# Patient Record
Sex: FEMALE | Race: WHITE | NOT HISPANIC OR LATINO | Employment: FULL TIME | URBAN - NONMETROPOLITAN AREA
[De-identification: names, ages, dates, MRNs, and addresses within clinical notes are randomized per-mention and may not be internally consistent; named-entity substitution may affect disease eponyms.]

---

## 2017-03-15 ENCOUNTER — HOSPITAL ENCOUNTER (OUTPATIENT)
Dept: ULTRASOUND IMAGING | Facility: HOSPITAL | Age: 47
Discharge: HOME | End: 2017-03-15
Attending: SPECIALIST | Admitting: SPECIALIST

## 2017-03-15 PROCEDURE — 76642 ULTRASOUND BREAST LIMITED: CPT

## 2017-03-22 ENCOUNTER — TRANSCRIBE ORDERS (OUTPATIENT)
Dept: ADMINISTRATIVE | Facility: HOSPITAL | Age: 47
End: 2017-03-22

## 2017-03-22 DIAGNOSIS — Z12.31 ENCOUNTER FOR SCREENING MAMMOGRAM FOR MALIGNANT NEOPLASM OF BREAST: Primary | ICD-10-CM

## 2017-09-29 ENCOUNTER — TRANSCRIBE ORDERS (OUTPATIENT)
Dept: ADMINISTRATIVE | Facility: HOSPITAL | Age: 47
End: 2017-09-29

## 2017-09-29 ENCOUNTER — HOSPITAL ENCOUNTER (OUTPATIENT)
Dept: MAMMOGRAPHY | Facility: HOSPITAL | Age: 47
Discharge: HOME | End: 2017-09-29
Attending: SPECIALIST | Admitting: SPECIALIST

## 2017-09-29 DIAGNOSIS — N63.20 MASSES OF BOTH BREASTS: Primary | ICD-10-CM

## 2017-09-29 DIAGNOSIS — Z12.31 ENCOUNTER FOR SCREENING MAMMOGRAM FOR MALIGNANT NEOPLASM OF BREAST: ICD-10-CM

## 2017-09-29 DIAGNOSIS — N63.10 MASSES OF BOTH BREASTS: Primary | ICD-10-CM

## 2017-09-29 PROCEDURE — G0202 SCR MAMMO BI INCL CAD: HCPCS

## 2017-09-29 PROCEDURE — 77063 BREAST TOMOSYNTHESIS BI: CPT

## 2017-10-03 ENCOUNTER — TRANSCRIBE ORDERS (OUTPATIENT)
Dept: ADMINISTRATIVE | Facility: HOSPITAL | Age: 47
End: 2017-10-03

## 2017-10-03 DIAGNOSIS — Z12.31 ENCOUNTER FOR SCREENING MAMMOGRAM FOR MALIGNANT NEOPLASM OF BREAST: Primary | ICD-10-CM

## 2018-10-01 ENCOUNTER — HOSPITAL ENCOUNTER (OUTPATIENT)
Dept: MAMMOGRAPHY | Facility: HOSPITAL | Age: 48
Discharge: HOME | End: 2018-10-01
Attending: SPECIALIST | Admitting: SPECIALIST

## 2018-10-01 ENCOUNTER — HOSPITAL ENCOUNTER (OUTPATIENT)
Dept: ULTRASOUND IMAGING | Facility: HOSPITAL | Age: 48
Discharge: HOME | End: 2018-10-01

## 2018-10-01 DIAGNOSIS — N63.20 LEFT BREAST LUMP: ICD-10-CM

## 2018-10-01 DIAGNOSIS — Z12.31 ENCOUNTER FOR SCREENING MAMMOGRAM FOR MALIGNANT NEOPLASM OF BREAST: ICD-10-CM

## 2018-10-01 PROCEDURE — 77063 BREAST TOMOSYNTHESIS BI: CPT

## 2018-10-01 PROCEDURE — 76642 ULTRASOUND BREAST LIMITED: CPT

## 2018-10-01 PROCEDURE — 77067 SCR MAMMO BI INCL CAD: CPT

## 2018-10-10 ENCOUNTER — APPOINTMENT (OUTPATIENT)
Dept: CT IMAGING | Facility: HOSPITAL | Age: 48
End: 2018-10-10

## 2018-10-10 ENCOUNTER — HOSPITAL ENCOUNTER (EMERGENCY)
Facility: HOSPITAL | Age: 48
Discharge: HOME | End: 2018-10-10
Attending: EMERGENCY MEDICINE | Admitting: EMERGENCY MEDICINE

## 2018-10-10 VITALS
HEIGHT: 65 IN | OXYGEN SATURATION: 98 % | BODY MASS INDEX: 29.49 KG/M2 | WEIGHT: 177 LBS | RESPIRATION RATE: 16 BRPM | DIASTOLIC BLOOD PRESSURE: 75 MMHG | HEART RATE: 70 BPM | TEMPERATURE: 98.2 F | SYSTOLIC BLOOD PRESSURE: 121 MMHG

## 2018-10-10 DIAGNOSIS — K57.92 DIVERTICULITIS: Primary | ICD-10-CM

## 2018-10-10 LAB
ALBUMIN SERPL-MCNC: 4.4 G/DL (ref 3.5–5)
ALBUMIN/GLOB SERPL: 1.7 G/DL (ref 1.1–2.5)
ALP SERPL-CCNC: 54 U/L (ref 24–120)
ALT SERPL W P-5'-P-CCNC: 46 U/L (ref 0–54)
ANION GAP SERPL CALCULATED.3IONS-SCNC: 11 MMOL/L (ref 4–13)
AST SERPL-CCNC: 41 U/L (ref 7–45)
BACTERIA UR QL AUTO: ABNORMAL /HPF
BASOPHILS # BLD AUTO: 0.03 10*3/MM3 (ref 0–0.2)
BASOPHILS NFR BLD AUTO: 0.4 % (ref 0–2)
BILIRUB SERPL-MCNC: 0.9 MG/DL (ref 0.1–1)
BILIRUB UR QL STRIP: NEGATIVE
BUN BLD-MCNC: 9 MG/DL (ref 5–21)
BUN/CREAT SERPL: 14.1 (ref 7–25)
CALCIUM SPEC-SCNC: 9.1 MG/DL (ref 8.4–10.4)
CHLORIDE SERPL-SCNC: 101 MMOL/L (ref 98–110)
CLARITY UR: CLEAR
CO2 SERPL-SCNC: 29 MMOL/L (ref 24–31)
COLOR UR: YELLOW
CREAT BLD-MCNC: 0.64 MG/DL (ref 0.5–1.4)
DEPRECATED RDW RBC AUTO: 40.7 FL (ref 40–54)
EOSINOPHIL # BLD AUTO: 0.1 10*3/MM3 (ref 0–0.7)
EOSINOPHIL NFR BLD AUTO: 1.2 % (ref 0–4)
ERYTHROCYTE [DISTWIDTH] IN BLOOD BY AUTOMATED COUNT: 11.8 % (ref 12–15)
GFR SERPL CREATININE-BSD FRML MDRD: 100 ML/MIN/1.73
GLOBULIN UR ELPH-MCNC: 2.6 GM/DL
GLUCOSE BLD-MCNC: 91 MG/DL (ref 70–100)
GLUCOSE UR STRIP-MCNC: NEGATIVE MG/DL
HCG SERPL QL: NEGATIVE
HCT VFR BLD AUTO: 40.9 % (ref 37–47)
HGB BLD-MCNC: 14.1 G/DL (ref 12–16)
HGB UR QL STRIP.AUTO: NEGATIVE
HOLD SPECIMEN: NORMAL
HYALINE CASTS UR QL AUTO: ABNORMAL /LPF
IMM GRANULOCYTES # BLD: 0.03 10*3/MM3 (ref 0–0.03)
IMM GRANULOCYTES NFR BLD: 0.4 % (ref 0–5)
KETONES UR QL STRIP: NEGATIVE
LEUKOCYTE ESTERASE UR QL STRIP.AUTO: ABNORMAL
LYMPHOCYTES # BLD AUTO: 1.35 10*3/MM3 (ref 0.72–4.86)
LYMPHOCYTES NFR BLD AUTO: 16.3 % (ref 15–45)
MCH RBC QN AUTO: 32.9 PG (ref 28–32)
MCHC RBC AUTO-ENTMCNC: 34.5 G/DL (ref 33–36)
MCV RBC AUTO: 95.3 FL (ref 82–98)
MONOCYTES # BLD AUTO: 0.66 10*3/MM3 (ref 0.19–1.3)
MONOCYTES NFR BLD AUTO: 8 % (ref 4–12)
NEUTROPHILS # BLD AUTO: 6.1 10*3/MM3 (ref 1.87–8.4)
NEUTROPHILS NFR BLD AUTO: 73.7 % (ref 39–78)
NITRITE UR QL STRIP: NEGATIVE
NRBC BLD MANUAL-RTO: 0 /100 WBC (ref 0–0)
PH UR STRIP.AUTO: 8.5 [PH] (ref 5–8)
PLATELET # BLD AUTO: 282 10*3/MM3 (ref 130–400)
PMV BLD AUTO: 9.6 FL (ref 6–12)
POTASSIUM BLD-SCNC: 4 MMOL/L (ref 3.5–5.3)
PROT SERPL-MCNC: 7 G/DL (ref 6.3–8.7)
PROT UR QL STRIP: NEGATIVE
RBC # BLD AUTO: 4.29 10*6/MM3 (ref 4.2–5.4)
RBC # UR: ABNORMAL /HPF
REF LAB TEST METHOD: ABNORMAL
SODIUM BLD-SCNC: 141 MMOL/L (ref 135–145)
SP GR UR STRIP: 1.02 (ref 1–1.03)
SQUAMOUS #/AREA URNS HPF: ABNORMAL /HPF
UROBILINOGEN UR QL STRIP: ABNORMAL
WBC NRBC COR # BLD: 8.27 10*3/MM3 (ref 4.8–10.8)
WBC UR QL AUTO: ABNORMAL /HPF
WHOLE BLOOD HOLD SPECIMEN: NORMAL

## 2018-10-10 PROCEDURE — 99284 EMERGENCY DEPT VISIT MOD MDM: CPT

## 2018-10-10 PROCEDURE — 80053 COMPREHEN METABOLIC PANEL: CPT | Performed by: EMERGENCY MEDICINE

## 2018-10-10 PROCEDURE — 84703 CHORIONIC GONADOTROPIN ASSAY: CPT | Performed by: EMERGENCY MEDICINE

## 2018-10-10 PROCEDURE — 25010000002 IOPAMIDOL 61 % SOLUTION: Performed by: EMERGENCY MEDICINE

## 2018-10-10 PROCEDURE — 85025 COMPLETE CBC W/AUTO DIFF WBC: CPT | Performed by: EMERGENCY MEDICINE

## 2018-10-10 PROCEDURE — 81001 URINALYSIS AUTO W/SCOPE: CPT | Performed by: EMERGENCY MEDICINE

## 2018-10-10 PROCEDURE — 74177 CT ABD & PELVIS W/CONTRAST: CPT

## 2018-10-10 PROCEDURE — 87086 URINE CULTURE/COLONY COUNT: CPT | Performed by: EMERGENCY MEDICINE

## 2018-10-10 RX ORDER — METRONIDAZOLE 500 MG/1
500 TABLET ORAL 2 TIMES DAILY
Qty: 20 TABLET | Refills: 0 | Status: SHIPPED | OUTPATIENT
Start: 2018-10-10 | End: 2019-02-18

## 2018-10-10 RX ORDER — CIPROFLOXACIN 500 MG/1
500 TABLET, FILM COATED ORAL 2 TIMES DAILY
Qty: 20 TABLET | Refills: 0 | Status: SHIPPED | OUTPATIENT
Start: 2018-10-10 | End: 2019-02-18

## 2018-10-10 RX ORDER — LEVOTHYROXINE SODIUM 0.07 MG/1
75 TABLET ORAL DAILY
COMMUNITY

## 2018-10-10 RX ORDER — SODIUM CHLORIDE 0.9 % (FLUSH) 0.9 %
10 SYRINGE (ML) INJECTION AS NEEDED
Status: DISCONTINUED | OUTPATIENT
Start: 2018-10-10 | End: 2018-10-10 | Stop reason: HOSPADM

## 2018-10-10 RX ORDER — CHLORAL HYDRATE 500 MG
2000 CAPSULE ORAL
COMMUNITY

## 2018-10-10 RX ORDER — HYDROCODONE BITARTRATE AND ACETAMINOPHEN 7.5; 325 MG/1; MG/1
1 TABLET ORAL EVERY 4 HOURS PRN
Qty: 15 TABLET | Refills: 0 | Status: SHIPPED | OUTPATIENT
Start: 2018-10-10 | End: 2019-02-18

## 2018-10-10 RX ORDER — FEXOFENADINE HCL 180 MG/1
180 TABLET ORAL DAILY
COMMUNITY
End: 2022-02-14 | Stop reason: SDUPTHER

## 2018-10-10 RX ORDER — VILAZODONE HYDROCHLORIDE 40 MG/1
40 TABLET ORAL DAILY
COMMUNITY
End: 2020-02-20

## 2018-10-10 RX ADMIN — IOPAMIDOL 100 ML: 612 INJECTION, SOLUTION INTRAVENOUS at 11:18

## 2018-10-10 NOTE — ED PROVIDER NOTES
Subjective   Patient complains of lower abdominal pain but really got bad this morning.  She said some diarrhea off and on for the past week or so.  She says the pain is cramping in nature, waxes and wanes.  It is mostly left lower quadrant.  She did have a UTI about a month ago and still has some dysuria but she has finished all medications.  She does not have periods because she had ablation therapy in the past.  She had her gallbladder out some years ago.        History provided by:  Patient   used: No    Abdominal Pain   Pain location:  LLQ  Pain quality: aching    Pain radiates to:  Does not radiate  Pain severity:  Severe  Onset quality:  Sudden  Duration:  1 day  Timing:  Intermittent  Progression:  Worsening  Chronicity:  New  Context: not alcohol use, not awakening from sleep, not diet changes, not eating, not laxative use, not medication withdrawal, not recent illness, not recent travel, not retching, not sick contacts, not suspicious food intake and not trauma    Relieved by:  Nothing  Worsened by:  Nothing  Ineffective treatments:  None tried  Associated symptoms: diarrhea and dysuria    Associated symptoms: no anorexia, no constipation, no cough, no hematemesis, no hematochezia, no shortness of breath, no sore throat and no vaginal discharge    Risk factors: no alcohol abuse, has not had multiple surgeries, no NSAID use and no recent hospitalization        Review of Systems   Constitutional: Negative.    HENT: Negative.  Negative for sore throat.    Respiratory: Negative.  Negative for cough and shortness of breath.    Cardiovascular: Negative.    Gastrointestinal: Positive for abdominal pain and diarrhea. Negative for anorexia, constipation, hematemesis and hematochezia.   Genitourinary: Positive for dysuria. Negative for vaginal discharge.   Musculoskeletal: Negative.    Skin: Negative.    Neurological: Negative.    Hematological: Negative.    Psychiatric/Behavioral: Negative.     All other systems reviewed and are negative.      Past Medical History:   Diagnosis Date   • Anxiety    • Hyperlipidemia    • Hypothyroid        Allergies   Allergen Reactions   • Bactrim [Sulfamethoxazole-Trimethoprim] Anaphylaxis       Past Surgical History:   Procedure Laterality Date   • BREAST BIOPSY Left 2 yrs ago   • CHOLECYSTECTOMY         Family History   Problem Relation Age of Onset   • No Known Problems Mother    • No Known Problems Father    • No Known Problems Sister    • No Known Problems Brother    • No Known Problems Daughter    • No Known Problems Son    • No Known Problems Maternal Grandmother    • No Known Problems Paternal Grandmother    • No Known Problems Maternal Aunt    • No Known Problems Paternal Aunt    • BRCA 1/2 Neg Hx    • Breast cancer Neg Hx    • Colon cancer Neg Hx    • Endometrial cancer Neg Hx    • Ovarian cancer Neg Hx        Social History     Social History   • Marital status:      Social History Main Topics   • Smoking status: Never Smoker   • Alcohol use No   • Drug use: No     Other Topics Concern   • Not on file       Prior to Admission medications    Medication Sig Start Date End Date Taking? Authorizing Provider   fexofenadine (ALLEGRA) 180 MG tablet Take 180 mg by mouth Daily.   Yes Deedee Dhillon MD   levothyroxine (SYNTHROID, LEVOTHROID) 75 MCG tablet Take 75 mcg by mouth Daily.   Yes Deedee Dhillon MD   Omega-3 Fatty Acids (FISH OIL) 1000 MG capsule capsule Take 2,000 mg by mouth Daily With Breakfast.   Yes ProviderDeedee MD   vilazodone (VIIBRYD) 40 MG tablet tablet Take 40 mg by mouth Daily.   Yes ProviderDeedee MD       Medications   sodium chloride 0.9 % flush 10 mL (not administered)   iopamidol (ISOVUE-300) 61 % injection 100 mL (100 mL Intravenous Given 10/10/18 1118)       Vitals:    10/10/18 1042   BP: 114/62   Pulse: 77   Resp: 16   Temp:    SpO2: 95%         Objective   Physical Exam   Constitutional: She is oriented to  person, place, and time. She appears well-developed and well-nourished.   HENT:   Head: Normocephalic and atraumatic.   Neck: Normal range of motion. Neck supple.   Cardiovascular: Normal rate and regular rhythm.    Pulmonary/Chest: Effort normal and breath sounds normal.   Abdominal: Soft. Bowel sounds are normal. There is tenderness.   Patient is tender to palpation in the left lower quadrant but there is no rebound or percussion tenderness noted.   Musculoskeletal: Normal range of motion.   Neurological: She is alert and oriented to person, place, and time.   Skin: Skin is warm and dry.   Psychiatric: She has a normal mood and affect. Her behavior is normal.   Nursing note and vitals reviewed.      Procedures         Lab Results (last 24 hours)     Procedure Component Value Units Date/Time    Urinalysis With Culture If Indicated - Urine, Clean Catch [[id number]]  (Abnormal) Collected:  10/10/18 1010    Specimen:  Urine from Urine, Clean Catch Updated:  10/10/18 1049     Color, UA Yellow     Appearance, UA Clear     pH, UA 8.5 (H)     Specific Gravity, UA 1.016     Glucose, UA Negative     Ketones, UA Negative     Bilirubin, UA Negative     Blood, UA Negative     Protein, UA Negative     Leuk Esterase, UA Large (3+) (A)     Nitrite, UA Negative     Urobilinogen, UA 0.2 E.U./dL    Urinalysis, Microscopic Only - Urine, Clean Catch [[id number]]  (Abnormal) Collected:  10/10/18 1010    Specimen:  Urine from Urine, Clean Catch Updated:  10/10/18 1049     RBC, UA 0-2 (A) /HPF      WBC, UA 21-30 (A) /HPF      Bacteria, UA 1+ (A) /HPF      Squamous Epithelial Cells, UA 0-2 /HPF      Hyaline Casts, UA 0-2 /LPF      Methodology Automated Microscopy    Urine Culture - Urine, [[id number]] Collected:  10/10/18 1010    Specimen:  Urine from Urine, Clean Catch Updated:  10/10/18 1049    CBC & Differential [[id number]] Collected:  10/10/18 1015    Specimen:  Blood Updated:  10/10/18 1042    Narrative:       The following orders were  created for panel order CBC & Differential.  Procedure                               Abnormality         Status                     ---------                               -----------         ------                     CBC Auto Differential[[id number]]        Abnormal            Final result                 Please view results for these tests on the individual orders.    Comprehensive Metabolic Panel [[id number]] Collected:  10/10/18 1015    Specimen:  Blood from Arm, Left Updated:  10/10/18 1055     Glucose 91 mg/dL      BUN 9 mg/dL      Creatinine 0.64 mg/dL      Sodium 141 mmol/L      Potassium 4.0 mmol/L      Chloride 101 mmol/L      CO2 29.0 mmol/L      Calcium 9.1 mg/dL      Total Protein 7.0 g/dL      Albumin 4.40 g/dL      ALT (SGPT) 46 U/L      AST (SGOT) 41 U/L      Alkaline Phosphatase 54 U/L      Total Bilirubin 0.9 mg/dL      eGFR Non African Amer 100 mL/min/1.73      Globulin 2.6 gm/dL      A/G Ratio 1.7 g/dL      BUN/Creatinine Ratio 14.1     Anion Gap 11.0 mmol/L     hCG, Serum, Qualitative [[id number]]  (Normal) Collected:  10/10/18 1015    Specimen:  Blood from Arm, Left Updated:  10/10/18 1054     HCG Qualitative Negative    CBC Auto Differential [[id number]]  (Abnormal) Collected:  10/10/18 1015    Specimen:  Blood from Arm, Left Updated:  10/10/18 1042     WBC 8.27 10*3/mm3      RBC 4.29 10*6/mm3      Hemoglobin 14.1 g/dL      Hematocrit 40.9 %      MCV 95.3 fL      MCH 32.9 (H) pg      MCHC 34.5 g/dL      RDW 11.8 (L) %      RDW-SD 40.7 fl      MPV 9.6 fL      Platelets 282 10*3/mm3      Neutrophil % 73.7 %      Lymphocyte % 16.3 %      Monocyte % 8.0 %      Eosinophil % 1.2 %      Basophil % 0.4 %      Immature Grans % 0.4 %      Neutrophils, Absolute 6.10 10*3/mm3      Lymphocytes, Absolute 1.35 10*3/mm3      Monocytes, Absolute 0.66 10*3/mm3      Eosinophils, Absolute 0.10 10*3/mm3      Basophils, Absolute 0.03 10*3/mm3      Immature Grans, Absolute 0.03 10*3/mm3      nRBC 0.0 /100 WBC            CT Abdomen Pelvis With Contrast   Final Result   1. Findings compatible with acute segmental sigmoid   diverticulitis/colitis with no evidence of perforation or abscess. With   the colonic wall thickening, follow-up is recommended with short   interval repeat CT or colonoscopy to exclude underlying neoplasm.   2. 4 cm left ovarian cyst, follow-up pelvic ultrasound is suggested.   3. Mild fatty infiltration of the liver. Evidence of previous   cholecystectomy.   4. 3.3 cm cystic lesion in the lower outer quadrant of the left breast,   the patient has a history of fibrocystic breast disease.           This report was finalized on 10/10/2018 11:34 by Dr. Rik Tomas MD.          ED Course  ED Course as of Oct 10 1246   Wed Oct 10, 2018   1245 Told the patient the findings of diverticulitis on CT.  We will treat that as an outpatient since she is not running fever and does not have an elevated white blood cell count and that is what she wishes.  She is discharged in stable condition.  [TR]      ED Course User Index  [TR] Kj Holbrook Jr., MD          MDM  Number of Diagnoses or Management Options  Diverticulitis: new and requires workup     Amount and/or Complexity of Data Reviewed  Clinical lab tests: ordered and reviewed  Tests in the radiology section of CPT®: ordered and reviewed    Risk of Complications, Morbidity, and/or Mortality  Presenting problems: moderate  Diagnostic procedures: moderate  Management options: moderate    Patient Progress  Patient progress: stable      Final diagnoses:   Diverticulitis          Kj Holbrook Jr., MD  10/10/18 1246

## 2018-10-12 LAB — BACTERIA SPEC AEROBE CULT: ABNORMAL

## 2018-11-08 RX ORDER — VILAZODONE HYDROCHLORIDE 40 MG/1
40 TABLET ORAL DAILY
COMMUNITY
End: 2019-03-15 | Stop reason: ALTCHOICE

## 2018-11-08 RX ORDER — AMOXICILLIN 500 MG
1200 CAPSULE ORAL DAILY
COMMUNITY
End: 2019-03-07

## 2018-11-08 RX ORDER — ZINC GLUCONATE 50 MG
50 TABLET ORAL DAILY
COMMUNITY
End: 2021-09-30 | Stop reason: ALTCHOICE

## 2018-11-08 RX ORDER — FEXOFENADINE HCL 180 MG/1
180 TABLET ORAL DAILY
COMMUNITY

## 2018-11-08 RX ORDER — LEVOTHYROXINE SODIUM 0.07 MG/1
75 TABLET ORAL DAILY
COMMUNITY
End: 2019-04-24

## 2018-11-09 ENCOUNTER — OFFICE VISIT (OUTPATIENT)
Dept: GASTROENTEROLOGY | Age: 48
End: 2018-11-09
Payer: COMMERCIAL

## 2018-11-09 VITALS
HEIGHT: 65 IN | SYSTOLIC BLOOD PRESSURE: 120 MMHG | BODY MASS INDEX: 30.12 KG/M2 | WEIGHT: 180.8 LBS | HEART RATE: 90 BPM | DIASTOLIC BLOOD PRESSURE: 80 MMHG | OXYGEN SATURATION: 98 %

## 2018-11-09 DIAGNOSIS — K52.9 CHRONIC DIARRHEA: Primary | ICD-10-CM

## 2018-11-09 DIAGNOSIS — Z90.49 S/P CHOLECYSTECTOMY: ICD-10-CM

## 2018-11-09 DIAGNOSIS — R13.10 DYSPHAGIA, UNSPECIFIED TYPE: ICD-10-CM

## 2018-11-09 DIAGNOSIS — Z87.19 HISTORY OF COLONIC DIVERTICULITIS: ICD-10-CM

## 2018-11-09 DIAGNOSIS — R11.0 NAUSEA: ICD-10-CM

## 2018-11-09 PROCEDURE — 99203 OFFICE O/P NEW LOW 30 MIN: CPT | Performed by: NURSE PRACTITIONER

## 2018-11-09 RX ORDER — ONDANSETRON 4 MG/1
TABLET, FILM COATED ORAL
Qty: 5 TABLET | Refills: 0 | Status: SHIPPED | OUTPATIENT
Start: 2018-11-09 | End: 2019-02-22

## 2018-11-09 ASSESSMENT — ENCOUNTER SYMPTOMS
VOMITING: 0
BACK PAIN: 0
ANAL BLEEDING: 0
DIARRHEA: 1
RECTAL PAIN: 0
CONSTIPATION: 0
TROUBLE SWALLOWING: 1
BLOOD IN STOOL: 0
SHORTNESS OF BREATH: 0
COUGH: 0
NAUSEA: 0
ABDOMINAL PAIN: 0
ABDOMINAL DISTENTION: 0
SORE THROAT: 0
VOICE CHANGE: 0

## 2018-11-09 NOTE — PATIENT INSTRUCTIONS
You are going to have an Endoscopy and here are some basic instructions:    Nothing to eat or drink after midnight EXCEPT:  PLEASE TAKE MEDICATION(S) FOR HIGH BLOOD PRESSURE, SEIZURES, HEART, AND THYROID WITH A SIP OF WATER AT LEAST 2 HOURS PRIOR TO ARRIVAL TIME.   YOU MAY ALSO TAKE ANY INHALERS YOU ARE PRESCRIBED. You will not be able to drive for 24 hours after the procedure due to sedation. Bring a  with you the day of the procedure. No aspirin, ibuprofen, naproxen, fish oil or vitamin E for 5 days before procedure. Continue current medications. If you are on blood thinners, clearance from the prescribing physician will be obtained before your procedure is scheduled. Increased [email or web address] may be associated with discontinuation of your blood thinner and include, but not limited to, stroke, TIA, or cardiac event. If biopsies are taken during the procedure they will be sent to a pathologist for analysis. You will be notified by mail of the pathology results in 2-3 weeks. Your physician may also schedule a follow up appointment with the nurse practitioner to discuss pathology, symptoms or to check if you have had any problems related to your procedure. If you prefer not to return to the office after your procedure please discuss this with your physician on the day of your procedure. You are going to have a colonoscopy and here are some instructions: You will be given specific directions regarding restrictions to diet and bowel prep instructions including laxatives. Please read these instructions one week prior to your scheduled procedure to ensure that you are prepared. Follow prep instructions provided for bowel prep. Take all of the bowel prep as directed. If you are having problems with nausea, stop your prep for 30-45 min to allow the nausea to subside before resuming your prep.      Nothing to eat or drink after midnight the day of the procedure EXCEPT:  PLEASE TAKE

## 2018-11-09 NOTE — PROGRESS NOTES
Subjective:      Myrtle Urbano is a55 y.o. female  Chief Complaint   Patient presents with    New Patient     ref by Dr. Sameer Olivo for chronic diarrhea, hx of diverticultitis       HPI  PCP: Amilcar Sharp MD  Referring Provider: Wilberto Mock MD  New pt referred here for recent diverticulitis attack. Also had a UTI at that time. Occurred in October 2018. Took antibiotics for 2 weeks. Had a reaction to the antibiotics and couldn't walk. This was the only diverticulitis attack she has ever had. Presented with c/o lower abdominal pain. The lower abdominal pain has since resolved. C/o dysphagia. Chronic for >2 years. Gradually getting worse. Now occurs almost daily. Has to regurgitate foods up often. Occurs with foods only. Any type of foods, but especially meats and breads. Has chronic allergies, no asthma. No daily heartburn. No other UGI complaints. C/o postprandial diarrhea. This is chronic since her GB was removed in 2016. Worse over the past month after she took the antibioitics for the diverticuliitis and UTI. Has approx 10-12 diarrhea stools daily. No blood in stool. EGD in  1990's- \"normal\" per pt recall    Family HX:    Pt denies family hx of colon polyps, colon CA, inflammatory bowel dx, gastric CA and esophageal CA. Past Medical History:   Diagnosis Date    Anxiety     GERD (gastroesophageal reflux disease)     Hypothyroidism        Past Surgical History:   Procedure Laterality Date    CHOLECYSTECTOMY  2016     DR. Sunil Thayer EGD      Dr. Gifty Hanna History     Social History    Marital status:      Spouse name: N/A    Number of children: N/A    Years of education: N/A     Social History Main Topics    Smoking status: Never Smoker    Smokeless tobacco: Never Used    Alcohol use No    Drug use: No    Sexual activity: Not Asked     Other Topics Concern    None     Social History Narrative    None       Allergies   Allergen gallop and no friction rub. No murmur heard. Pulmonary/Chest: Effort normal and breath sounds normal. No respiratory distress. Abdominal: Soft. Bowel sounds are normal. She exhibits no distension. There is no tenderness. There is no rebound. Musculoskeletal: Normal range of motion. She exhibits no edema or deformity. Neurological: She is alert and oriented to person, place, and time. No cranial nerve deficit. Psychiatric: She has a normal mood and affect. Judgment normal.   Nursing note and vitals reviewed. Assessment:       Diagnosis Orders   1. Chronic diarrhea  COLONOSCOPY W/ OR W/O BIOPSY    ESOPHAGOSCOPY / EGD   2. S/P cholecystectomy     3. Dysphagia, unspecified type  ESOPHAGOSCOPY / EGD   4. History of colonic diverticulitis  COLONOSCOPY W/ OR W/O BIOPSY         Plan:      1. Diatherix rectal swab, will call with results. If normal will escribe colestipol for her. 2. Schedule outpatient endoscopy- r/o celiac sprue. Patient advised no Aspirin, Fish Oil, Vit E or NSAIDs 5 (five) days before procedure. Follow-up Visit: per Dr. Arley Escudero  Pt Education:   Risks, benefits, and alternatives to endoscopy were discussed. Patient voices understanding of risks of, but not limited to, perforation, bleeding, and infection. The risk of perforation is increased with esophageal dilatation. All questions answered to patient's satisfaction. Patient is agreable to proceed. 3. Schedule outpatient colonoscopy-random colon bx. Pt advised that if she has new onset of LLQ pain prior to colonoscopy she needs to call us so we can order a CT to assure no re-occurrence of diverticulitis, because performing a colonoscopy during an acute diverticulitis flare increases the risk of perforation. She voices understanding. Patient advised no Aspirin, Fish Oil, Vit E or NSAIDs 5 (five) days before procedure. Follow-up Visit: per Dr Arley Escudero  Pt education:  Risks, benefits, and alternatives to colonoscopy were discussed.

## 2018-11-12 ENCOUNTER — ANESTHESIA EVENT (OUTPATIENT)
Dept: ENDOSCOPY | Age: 48
End: 2018-11-12
Payer: COMMERCIAL

## 2018-11-13 ENCOUNTER — HOSPITAL ENCOUNTER (OUTPATIENT)
Age: 48
Setting detail: OUTPATIENT SURGERY
Discharge: HOME | End: 2018-11-13
Attending: INTERNAL MEDICINE | Admitting: INTERNAL MEDICINE
Payer: COMMERCIAL

## 2018-11-13 ENCOUNTER — ANESTHESIA (OUTPATIENT)
Dept: ENDOSCOPY | Age: 48
End: 2018-11-13
Payer: COMMERCIAL

## 2018-11-13 ENCOUNTER — TELEPHONE (OUTPATIENT)
Dept: GASTROENTEROLOGY | Age: 48
End: 2018-11-13

## 2018-11-13 VITALS
WEIGHT: 177 LBS | TEMPERATURE: 97.8 F | RESPIRATION RATE: 18 BRPM | SYSTOLIC BLOOD PRESSURE: 129 MMHG | DIASTOLIC BLOOD PRESSURE: 74 MMHG | HEIGHT: 65 IN | BODY MASS INDEX: 29.49 KG/M2 | HEART RATE: 79 BPM | OXYGEN SATURATION: 99 %

## 2018-11-13 VITALS
RESPIRATION RATE: 18 BRPM | OXYGEN SATURATION: 93 % | SYSTOLIC BLOOD PRESSURE: 109 MMHG | DIASTOLIC BLOOD PRESSURE: 65 MMHG

## 2018-11-13 DIAGNOSIS — K52.9 CHRONIC DIARRHEA: Primary | ICD-10-CM

## 2018-11-13 DIAGNOSIS — Z90.49 S/P CHOLECYSTECTOMY: ICD-10-CM

## 2018-11-13 LAB — HCG(URINE) PREGNANCY TEST: NEGATIVE

## 2018-11-13 PROCEDURE — 7100000011 HC PHASE II RECOVERY - ADDTL 15 MIN: Performed by: INTERNAL MEDICINE

## 2018-11-13 PROCEDURE — 43248 EGD GUIDE WIRE INSERTION: CPT | Performed by: INTERNAL MEDICINE

## 2018-11-13 PROCEDURE — 2580000003 HC RX 258: Performed by: INTERNAL MEDICINE

## 2018-11-13 PROCEDURE — 88305 TISSUE EXAM BY PATHOLOGIST: CPT

## 2018-11-13 PROCEDURE — 6360000002 HC RX W HCPCS: Performed by: NURSE ANESTHETIST, CERTIFIED REGISTERED

## 2018-11-13 PROCEDURE — 3700000000 HC ANESTHESIA ATTENDED CARE: Performed by: INTERNAL MEDICINE

## 2018-11-13 PROCEDURE — 2500000003 HC RX 250 WO HCPCS: Performed by: NURSE ANESTHETIST, CERTIFIED REGISTERED

## 2018-11-13 PROCEDURE — 88312 SPECIAL STAINS GROUP 1: CPT

## 2018-11-13 PROCEDURE — 3609012400 HC EGD TRANSORAL BIOPSY SINGLE/MULTIPLE: Performed by: INTERNAL MEDICINE

## 2018-11-13 PROCEDURE — 2709999900 HC NON-CHARGEABLE SUPPLY: Performed by: INTERNAL MEDICINE

## 2018-11-13 PROCEDURE — 3609012700 HC EGD DILATION SAVORY: Performed by: INTERNAL MEDICINE

## 2018-11-13 PROCEDURE — 7100000010 HC PHASE II RECOVERY - FIRST 15 MIN: Performed by: INTERNAL MEDICINE

## 2018-11-13 PROCEDURE — 2500000003 HC RX 250 WO HCPCS: Performed by: INTERNAL MEDICINE

## 2018-11-13 PROCEDURE — 43239 EGD BIOPSY SINGLE/MULTIPLE: CPT | Performed by: INTERNAL MEDICINE

## 2018-11-13 PROCEDURE — 3700000001 HC ADD 15 MINUTES (ANESTHESIA): Performed by: INTERNAL MEDICINE

## 2018-11-13 PROCEDURE — 2720000010 HC SURG SUPPLY STERILE: Performed by: INTERNAL MEDICINE

## 2018-11-13 PROCEDURE — 81025 URINE PREGNANCY TEST: CPT

## 2018-11-13 RX ORDER — PROPOFOL 10 MG/ML
INJECTION, EMULSION INTRAVENOUS PRN
Status: DISCONTINUED | OUTPATIENT
Start: 2018-11-13 | End: 2018-11-13 | Stop reason: SDUPTHER

## 2018-11-13 RX ORDER — SODIUM CHLORIDE, SODIUM LACTATE, POTASSIUM CHLORIDE, CALCIUM CHLORIDE 600; 310; 30; 20 MG/100ML; MG/100ML; MG/100ML; MG/100ML
INJECTION, SOLUTION INTRAVENOUS CONTINUOUS
Status: DISCONTINUED | OUTPATIENT
Start: 2018-11-13 | End: 2018-11-13 | Stop reason: HOSPADM

## 2018-11-13 RX ORDER — LIDOCAINE HYDROCHLORIDE 10 MG/ML
1 INJECTION, SOLUTION EPIDURAL; INFILTRATION; INTRACAUDAL; PERINEURAL ONCE
Status: COMPLETED | OUTPATIENT
Start: 2018-11-13 | End: 2018-11-13

## 2018-11-13 RX ORDER — MONTELUKAST SODIUM 4 MG/1
1 TABLET, CHEWABLE ORAL 2 TIMES DAILY
Qty: 60 TABLET | Refills: 11 | Status: SHIPPED | OUTPATIENT
Start: 2018-11-13 | End: 2018-12-12 | Stop reason: SDUPTHER

## 2018-11-13 RX ORDER — PANTOPRAZOLE SODIUM 40 MG/1
40 TABLET, DELAYED RELEASE ORAL
Qty: 60 TABLET | Refills: 3 | Status: SHIPPED | OUTPATIENT
Start: 2018-11-13 | End: 2018-12-18 | Stop reason: SDUPTHER

## 2018-11-13 RX ORDER — LIDOCAINE HYDROCHLORIDE 20 MG/ML
INJECTION, SOLUTION INFILTRATION; PERINEURAL PRN
Status: DISCONTINUED | OUTPATIENT
Start: 2018-11-13 | End: 2018-11-13 | Stop reason: SDUPTHER

## 2018-11-13 RX ADMIN — LIDOCAINE HYDROCHLORIDE 40 MG: 20 INJECTION, SOLUTION INFILTRATION; PERINEURAL at 14:08

## 2018-11-13 RX ADMIN — SODIUM CHLORIDE, POTASSIUM CHLORIDE, SODIUM LACTATE AND CALCIUM CHLORIDE: 600; 310; 30; 20 INJECTION, SOLUTION INTRAVENOUS at 13:26

## 2018-11-13 RX ADMIN — LIDOCAINE HYDROCHLORIDE 1 ML: 10 INJECTION, SOLUTION EPIDURAL; INFILTRATION; INTRACAUDAL; PERINEURAL at 13:26

## 2018-11-13 RX ADMIN — PROPOFOL 400 MG: 10 INJECTION, EMULSION INTRAVENOUS at 14:08

## 2018-11-13 ASSESSMENT — PAIN SCALES - GENERAL
PAINLEVEL_OUTOF10: 0
PAINLEVEL_OUTOF10: 0

## 2018-11-13 ASSESSMENT — PAIN - FUNCTIONAL ASSESSMENT: PAIN_FUNCTIONAL_ASSESSMENT: 0-10

## 2018-11-13 NOTE — H&P
Patient Name: Montana Magallanes  : 1970  MRN: [id number]  DATE: 18    Allergies: Allergies   Allergen Reactions    Sulfamethoxazole-Trimethoprim Anaphylaxis    Azithromycin         ENDOSCOPY  History and Physical    Procedure:    [] Diagnostic Colonoscopy       [] Screening Colonoscopy  [x] EGD      [] ERCP      [] EUS       [] Other    [x] Previous office notes/History and Physical reviewed from the patients chart. Please see EMR for further details of HPI. I have examined the patient's status immediately prior to the procedure and:      Indications/HPI:    []Abdominal Pain   []Cancer- GI/Lung     []Fhx of colon CA/polyps  []History of Polyps  []Barretts            []Melena  []Abnormal Imaging              [x]Dysphagia              []Persistent Pneumonia   []Anemia                            []Food Impaction        []History of Polyps  [] GI Bleed             []Pulmonary nodule/Mass   []Change in bowel habits []Heartburn/Reflux  []Rectal Bleed (BRBPR)  []Chest Pain - Non Cardiac []Heme (+) Stool []Ulcers  []Constipation  []Hemoptysis  []Varices  []Diarrhea  []Hypoxemia    []Nausea/Vomiting   []Screening   []Crohns/Colitis  []Other:     Anesthesia:   [x] MAC [] Moderate Sedation   [] General   [] None     ROS: 12 pt Review of Symptoms was negative unless mentioned above    Medications:   Prior to Admission medications    Medication Sig Start Date End Date Taking?  Authorizing Provider   ondansetron (ZOFRAN) 4 MG tablet Take 2 tablets 30 min prior to colon prep, and again at bedtime if needed 18   LENORA Herbert   fexofenadine (ALLEGRA) 180 MG tablet Take 180 mg by mouth daily    Historical Provider, MD   Omega-3 Fatty Acids (FISH OIL) 1200 MG CAPS Take 1,200 mg by mouth daily    Historical Provider, MD   levothyroxine (SYNTHROID) 75 MCG tablet Take 75 mcg by mouth Daily    Historical Provider, MD   vilazodone HCl (VILAZODONE HCL) 40 MG TABS Take 40 mg by mouth daily    Historical Provider,

## 2018-11-13 NOTE — TELEPHONE ENCOUNTER
Please let Olya Montes know the diatherix rectal swab was negative for anything infectious. I am prescribibg colestipol for her as we discussed in the office.

## 2018-11-27 ENCOUNTER — LAB REQUISITION (OUTPATIENT)
Dept: LAB | Facility: HOSPITAL | Age: 48
End: 2018-11-27

## 2018-11-27 DIAGNOSIS — Z00.00 ENCOUNTER FOR GENERAL ADULT MEDICAL EXAMINATION WITHOUT ABNORMAL FINDINGS: ICD-10-CM

## 2018-11-27 PROCEDURE — 87205 SMEAR GRAM STAIN: CPT | Performed by: SPECIALIST

## 2018-11-27 PROCEDURE — 87015 SPECIMEN INFECT AGNT CONCNTJ: CPT | Performed by: SPECIALIST

## 2018-11-27 PROCEDURE — 87070 CULTURE OTHR SPECIMN AEROBIC: CPT | Performed by: SPECIALIST

## 2018-12-02 LAB
BACTERIA FLD CULT: NORMAL
GRAM STN SPEC: NORMAL
GRAM STN SPEC: NORMAL

## 2018-12-04 ENCOUNTER — TRANSCRIBE ORDERS (OUTPATIENT)
Dept: ADMINISTRATIVE | Facility: HOSPITAL | Age: 48
End: 2018-12-04

## 2018-12-04 DIAGNOSIS — Z12.39 SCREENING BREAST EXAMINATION: Primary | ICD-10-CM

## 2018-12-12 DIAGNOSIS — Z90.49 S/P CHOLECYSTECTOMY: ICD-10-CM

## 2018-12-12 DIAGNOSIS — K52.9 CHRONIC DIARRHEA: ICD-10-CM

## 2018-12-12 RX ORDER — PANTOPRAZOLE SODIUM 40 MG/1
40 TABLET, DELAYED RELEASE ORAL
Qty: 180 TABLET | Refills: 0 | OUTPATIENT
Start: 2018-12-12 | End: 2019-04-11

## 2018-12-12 RX ORDER — MONTELUKAST SODIUM 4 MG/1
1 TABLET, CHEWABLE ORAL 2 TIMES DAILY
Qty: 180 TABLET | Refills: 3 | OUTPATIENT
Start: 2018-12-12

## 2018-12-12 RX ORDER — MONTELUKAST SODIUM 4 MG/1
1 TABLET, CHEWABLE ORAL 2 TIMES DAILY
Qty: 180 TABLET | Refills: 3 | Status: SHIPPED | OUTPATIENT
Start: 2018-12-12 | End: 2019-03-07 | Stop reason: ALTCHOICE

## 2018-12-12 NOTE — TELEPHONE ENCOUNTER
Thanks David Matthews, I will resend the Colestipol Rx for [facility] to [facility].  Jose Daniel cma

## 2018-12-17 DIAGNOSIS — R13.10 DYSPHAGIA, UNSPECIFIED TYPE: Primary | ICD-10-CM

## 2018-12-17 DIAGNOSIS — K44.9 HIATAL HERNIA: ICD-10-CM

## 2018-12-17 DIAGNOSIS — Z79.899 CURRENT USE OF PROTON PUMP INHIBITOR: Primary | ICD-10-CM

## 2018-12-17 DIAGNOSIS — Z79.899 CURRENT USE OF PROTON PUMP INHIBITOR: ICD-10-CM

## 2018-12-17 LAB
ANION GAP SERPL CALCULATED.3IONS-SCNC: 15 MMOL/L (ref 7–19)
BUN BLDV-MCNC: 10 MG/DL (ref 6–20)
CALCIUM SERPL-MCNC: 8.9 MG/DL (ref 8.6–10)
CHLORIDE BLD-SCNC: 102 MMOL/L (ref 98–111)
CO2: 24 MMOL/L (ref 22–29)
CREAT SERPL-MCNC: 0.6 MG/DL (ref 0.5–0.9)
GFR NON-AFRICAN AMERICAN: >60
GLUCOSE BLD-MCNC: 134 MG/DL (ref 74–109)
POTASSIUM SERPL-SCNC: 3.5 MMOL/L (ref 3.5–5)
SODIUM BLD-SCNC: 141 MMOL/L (ref 136–145)

## 2018-12-17 RX ORDER — PANTOPRAZOLE SODIUM 40 MG/1
40 TABLET, DELAYED RELEASE ORAL
Qty: 180 TABLET | Refills: 0 | OUTPATIENT
Start: 2018-12-17 | End: 2019-04-16

## 2018-12-17 NOTE — TELEPHONE ENCOUNTER
Last OV with [facility] [address] [facility] on 11-9-18. No FU scheduled. Last EGD with  said PPI Bid x 3 months? [facility] faxed us a request to refill Pantoprazole 40 mg daily at a 90 day supply if approved by [facility]. This will save the patient money. I did see on the Egd  said PPI Bid x 3 months.  [place]

## 2018-12-17 NOTE — TELEPHONE ENCOUNTER
The patient called and she will come by the office today and  her lab order to get this BMP completed per [facility] aprn, I will print this off for her and place at the .  Jose Daniel ca

## 2018-12-18 PROBLEM — K44.9 HIATAL HERNIA: Status: ACTIVE | Noted: 2018-12-18

## 2018-12-18 RX ORDER — PANTOPRAZOLE SODIUM 40 MG/1
40 TABLET, DELAYED RELEASE ORAL
Qty: 120 TABLET | Refills: 0 | Status: SHIPPED | OUTPATIENT
Start: 2018-12-18 | End: 2019-03-07 | Stop reason: ALTCHOICE

## 2018-12-18 NOTE — TELEPHONE ENCOUNTER
Please let Jovon Teri know her kidney function is normal, but her blood sugar is elevated at 134 (normal is <109). I will escribe the protonix as we discussed in earlier encounters (BID for 2 months, at a 90 day supply), thanks!

## 2019-02-18 ENCOUNTER — OFFICE VISIT (OUTPATIENT)
Dept: OBSTETRICS AND GYNECOLOGY | Facility: CLINIC | Age: 49
End: 2019-02-18

## 2019-02-18 VITALS
BODY MASS INDEX: 29.99 KG/M2 | DIASTOLIC BLOOD PRESSURE: 70 MMHG | WEIGHT: 180 LBS | HEIGHT: 65 IN | SYSTOLIC BLOOD PRESSURE: 110 MMHG

## 2019-02-18 DIAGNOSIS — N95.1 PERIMENOPAUSE: ICD-10-CM

## 2019-02-18 DIAGNOSIS — N60.02 BREAST CYST, LEFT: ICD-10-CM

## 2019-02-18 DIAGNOSIS — Z01.419 WELL WOMAN EXAM WITH ROUTINE GYNECOLOGICAL EXAM: Primary | ICD-10-CM

## 2019-02-18 PROBLEM — K52.9 CHRONIC DIARRHEA: Status: ACTIVE | Noted: 2018-11-09

## 2019-02-18 PROBLEM — R13.10 DYSPHAGIA: Status: ACTIVE | Noted: 2018-11-09

## 2019-02-18 PROBLEM — Z90.49 S/P CHOLECYSTECTOMY: Status: ACTIVE | Noted: 2018-11-09

## 2019-02-18 PROBLEM — Z79.899 CURRENT USE OF PROTON PUMP INHIBITOR: Status: ACTIVE | Noted: 2018-12-17

## 2019-02-18 PROBLEM — Z87.19 HISTORY OF COLONIC DIVERTICULITIS: Status: ACTIVE | Noted: 2018-11-09

## 2019-02-18 PROBLEM — K44.9 HIATAL HERNIA: Status: ACTIVE | Noted: 2018-12-18

## 2019-02-18 PROCEDURE — 87624 HPV HI-RISK TYP POOLED RSLT: CPT | Performed by: NURSE PRACTITIONER

## 2019-02-18 PROCEDURE — G0123 SCREEN CERV/VAG THIN LAYER: HCPCS | Performed by: NURSE PRACTITIONER

## 2019-02-18 PROCEDURE — 99386 PREV VISIT NEW AGE 40-64: CPT | Performed by: NURSE PRACTITIONER

## 2019-02-18 RX ORDER — MONTELUKAST SODIUM 4 MG/1
1 TABLET, CHEWABLE ORAL
COMMUNITY
Start: 2018-12-12 | End: 2020-02-20

## 2019-02-18 RX ORDER — PANTOPRAZOLE SODIUM 40 MG/1
40 TABLET, DELAYED RELEASE ORAL
COMMUNITY
Start: 2018-12-18 | End: 2019-02-18 | Stop reason: SDUPTHER

## 2019-02-18 RX ORDER — PANTOPRAZOLE SODIUM 40 MG/1
TABLET, DELAYED RELEASE ORAL
COMMUNITY
Start: 2018-12-18 | End: 2020-02-20

## 2019-02-18 RX ORDER — MELATONIN
1000 DAILY
COMMUNITY

## 2019-02-18 NOTE — PROGRESS NOTES
Bre Birch is a 48 y.o.      Chief Complaint   Patient presents with   • Gynecologic Exam     Pt is here for annual exam PT is doing well no c/o            HPI - Bre is in today for annual.  She does not have menses due to having an endometrial ablation.  She sees Dr. Hayden regarding recurrent cysts in her breast, mostly in the left.  She recently had a mammogram that was sent to him. Bre has no sig. Gyn compalints.      The following portions of the patient's history were reviewed and updated as appropriate:vital signs, allergies, current medications, past family history, past medical history, past social history, past surgical history and problem list.      Current Outpatient Medications:   •  cholecalciferol (VITAMIN D3) 1000 units tablet, Take 1,000 Units by mouth Daily., Disp: , Rfl:   •  colestipol (COLESTID) 1 g tablet, Take 1 g by mouth., Disp: , Rfl:   •  fexofenadine (ALLEGRA) 180 MG tablet, Take 180 mg by mouth Daily., Disp: , Rfl:   •  levothyroxine (SYNTHROID, LEVOTHROID) 75 MCG tablet, Take 75 mcg by mouth Daily., Disp: , Rfl:   •  Omega-3 Fatty Acids (FISH OIL) 1000 MG capsule capsule, Take 2,000 mg by mouth Daily With Breakfast., Disp: , Rfl:   •  pantoprazole (PROTONIX) 40 MG EC tablet, , Disp: , Rfl:   •  vilazodone (VIIBRYD) 40 MG tablet tablet, Take 40 mg by mouth Daily., Disp: , Rfl:     Review of Systems   Constitutional: Negative for activity change, appetite change and fatigue.   HENT: Negative for congestion, dental problem, facial swelling and postnasal drip.    Eyes: Negative for blurred vision, pain and discharge.   Respiratory: Negative for apnea, cough and chest tightness.    Gastrointestinal: Negative for abdominal distention, abdominal pain, constipation and indigestion.   Endocrine: Negative for cold intolerance and heat intolerance.   Genitourinary: Negative for urinary incontinence, dyspareunia, genital sores and breast pain.   Musculoskeletal: Negative for  "arthralgias and joint swelling.   Neurological: Negative for facial asymmetry, speech difficulty, light-headedness and confusion.   Psychiatric/Behavioral: Negative for behavioral problems, hallucinations and self-injury.     Breast ROS:history of breast cysts and sees Dr. Thomas    Objective      /70   Ht 165.1 cm (65\")   Wt 81.6 kg (180 lb)   BMI 29.95 kg/m²       Physical Exam   Constitutional: She is oriented to person, place, and time. She appears well-developed and well-nourished. No distress.   HENT:   Head: Normocephalic and atraumatic.   Eyes: Right eye exhibits no discharge. Left eye exhibits no discharge.   Neck: Normal range of motion. Neck supple.   Cardiovascular: Normal rate and regular rhythm.   No murmur heard.  Pulmonary/Chest: Effort normal and breath sounds normal. Edema: cystic tissue palpable in the lateral left breast, patient thinks this is the way if has been feeling. Right breast exhibits no inverted nipple, no mass, no nipple discharge and no skin change. Left breast exhibits mass and tenderness. Left breast exhibits no inverted nipple and no skin change.       Abdominal: Soft. She exhibits no distension. There is no tenderness.   Genitourinary: Vagina normal and uterus normal. Rectal exam shows no mass. Pelvic exam was performed with patient supine. There is no tenderness or lesion on the right labia. There is no tenderness or lesion on the left labia. Cervix does not exhibit motion tenderness, discharge or friability. Right adnexum displays no tenderness and no fullness. Left adnexum displays no tenderness and no fullness. No erythema, tenderness or bleeding in the vagina. No foreign body in the vagina. No signs of injury around the vagina. No vaginal discharge found.   Musculoskeletal: Normal range of motion. She exhibits no edema.   Neurological: She is alert and oriented to person, place, and time.   Skin: Skin is warm and dry.   Psychiatric: She has a normal mood and affect. " Her behavior is normal. Judgment normal.   Nursing note and vitals reviewed.       Assessment/Plan       Bre was seen today for gynecologic exam.    Diagnoses and all orders for this visit:    Well woman exam with routine gynecological exam  -     Liquid-based Pap Smear, Screening    Perimenopause  No sig; symptoms, amenorrhea due to endometrial ablation    Breast cysts left  Lateral left breast very cystic and she sees Dr. Hayden for this, mammogram was done according to Bre Baeza and she states she can not remember when she saw him last.  She will make an appointment with Dr. Hayden.    Adult BMI 30.0-30.9 kg/sq m  The patient is asked to make an attempt to improve diet and exercise patterns to aid in medical management of this problem.    Patient is counseled re: BSE, diet, exercise, mammogram, calcium and Vit. D3          Kenia Aguirre, APRN  2/18/2019

## 2019-02-20 LAB
GEN CATEG CVX/VAG CYTO-IMP: NORMAL
LAB AP CASE REPORT: NORMAL
LAB AP GYN ADDITIONAL INFORMATION: NORMAL
LAB AP GYN OTHER FINDINGS: NORMAL
PATH INTERP SPEC-IMP: NORMAL
STAT OF ADQ CVX/VAG CYTO-IMP: NORMAL

## 2019-02-22 ENCOUNTER — OFFICE VISIT (OUTPATIENT)
Dept: PRIMARY CARE CLINIC | Age: 49
End: 2019-02-22
Payer: COMMERCIAL

## 2019-02-22 VITALS
WEIGHT: 184 LBS | DIASTOLIC BLOOD PRESSURE: 86 MMHG | HEART RATE: 74 BPM | TEMPERATURE: 98.1 F | SYSTOLIC BLOOD PRESSURE: 122 MMHG | HEIGHT: 65 IN | BODY MASS INDEX: 30.66 KG/M2 | OXYGEN SATURATION: 97 %

## 2019-02-22 DIAGNOSIS — R63.5 WEIGHT GAIN: ICD-10-CM

## 2019-02-22 DIAGNOSIS — M19.90 ARTHRITIS: ICD-10-CM

## 2019-02-22 DIAGNOSIS — K21.9 GASTROESOPHAGEAL REFLUX DISEASE, ESOPHAGITIS PRESENCE NOT SPECIFIED: ICD-10-CM

## 2019-02-22 DIAGNOSIS — F41.9 ANXIETY: ICD-10-CM

## 2019-02-22 DIAGNOSIS — R53.83 OTHER FATIGUE: ICD-10-CM

## 2019-02-22 DIAGNOSIS — R23.2 HOT FLASHES: ICD-10-CM

## 2019-02-22 DIAGNOSIS — F32.A DEPRESSION, UNSPECIFIED DEPRESSION TYPE: Primary | ICD-10-CM

## 2019-02-22 DIAGNOSIS — E03.9 HYPOTHYROIDISM, UNSPECIFIED TYPE: ICD-10-CM

## 2019-02-22 DIAGNOSIS — R68.82 DECREASED SEX DRIVE: ICD-10-CM

## 2019-02-22 DIAGNOSIS — Z82.69 FAMILY HISTORY OF SYSTEMIC LUPUS ERYTHEMATOSUS: ICD-10-CM

## 2019-02-22 LAB
ALBUMIN SERPL-MCNC: 5.1 G/DL (ref 3.5–5.2)
ALP BLD-CCNC: 58 U/L (ref 35–104)
ALT SERPL-CCNC: 27 U/L (ref 5–33)
ANION GAP SERPL CALCULATED.3IONS-SCNC: 13 MMOL/L (ref 7–19)
AST SERPL-CCNC: 20 U/L (ref 5–32)
BACTERIA: ABNORMAL /HPF
BASOPHILS ABSOLUTE: 0 K/UL (ref 0–0.2)
BASOPHILS RELATIVE PERCENT: 0.8 % (ref 0–1)
BILIRUB SERPL-MCNC: 0.7 MG/DL (ref 0.2–1.2)
BILIRUBIN URINE: NEGATIVE
BLOOD, URINE: ABNORMAL
BUN BLDV-MCNC: 8 MG/DL (ref 6–20)
CALCIUM SERPL-MCNC: 9.3 MG/DL (ref 8.6–10)
CHLORIDE BLD-SCNC: 101 MMOL/L (ref 98–111)
CLARITY: ABNORMAL
CO2: 26 MMOL/L (ref 22–29)
COLOR: YELLOW
CREAT SERPL-MCNC: 0.6 MG/DL (ref 0.5–0.9)
EOSINOPHILS ABSOLUTE: 0.1 K/UL (ref 0–0.6)
EOSINOPHILS RELATIVE PERCENT: 1.9 % (ref 0–5)
EPITHELIAL CELLS, UA: 3 /HPF (ref 0–5)
ESTRADIOL LEVEL: 51.2 PG/ML
FOLLICLE STIMULATING HORMONE: 5.2 MIU/ML
GFR NON-AFRICAN AMERICAN: >60
GLUCOSE BLD-MCNC: 102 MG/DL (ref 74–109)
GLUCOSE URINE: NEGATIVE MG/DL
HBA1C MFR BLD: 5.2 % (ref 4–6)
HCT VFR BLD CALC: 44.4 % (ref 37–47)
HEMOGLOBIN: 14.7 G/DL (ref 12–16)
HPV I/H RISK 4 DNA CVX QL PROBE+SIG AMP: NOT DETECTED
HYALINE CASTS: 4 /HPF (ref 0–8)
INSULIN: 17.4 MU/L (ref 2.6–24.9)
KETONES, URINE: NEGATIVE MG/DL
LEUKOCYTE ESTERASE, URINE: ABNORMAL
LUTEINIZING HORMONE: 3.2 MIU/ML
LYMPHOCYTES ABSOLUTE: 1.5 K/UL (ref 1.1–4.5)
LYMPHOCYTES RELATIVE PERCENT: 31.1 % (ref 20–40)
MCH RBC QN AUTO: 31.7 PG (ref 27–31)
MCHC RBC AUTO-ENTMCNC: 33.1 G/DL (ref 33–37)
MCV RBC AUTO: 95.9 FL (ref 81–99)
MONOCYTES ABSOLUTE: 0.3 K/UL (ref 0–0.9)
MONOCYTES RELATIVE PERCENT: 6.6 % (ref 0–10)
NEUTROPHILS ABSOLUTE: 2.9 K/UL (ref 1.5–7.5)
NEUTROPHILS RELATIVE PERCENT: 59.4 % (ref 50–65)
NITRITE, URINE: NEGATIVE
PDW BLD-RTO: 11.9 % (ref 11.5–14.5)
PH UA: 6.5
PLATELET # BLD: 295 K/UL (ref 130–400)
PMV BLD AUTO: 9.6 FL (ref 9.4–12.3)
POTASSIUM SERPL-SCNC: 4 MMOL/L (ref 3.5–5)
PROTEIN UA: NEGATIVE MG/DL
RBC # BLD: 4.63 M/UL (ref 4.2–5.4)
RBC UA: 6 /HPF (ref 0–4)
RHEUMATOID FACTOR: <10 IU/ML
SEDIMENTATION RATE, ERYTHROCYTE: 3 MM/HR (ref 0–20)
SODIUM BLD-SCNC: 140 MMOL/L (ref 136–145)
SPECIFIC GRAVITY UA: 1.02
T4 FREE: 1.1 NG/DL (ref 0.9–1.7)
TESTOSTERONE TOTAL: 24.9 NG/DL (ref 8.4–48.1)
TOTAL PROTEIN: 7.7 G/DL (ref 6.6–8.7)
TSH SERPL DL<=0.05 MIU/L-ACNC: 2.71 UIU/ML (ref 0.27–4.2)
URIC ACID, SERUM: 5.1 MG/DL (ref 2.4–5.7)
UROBILINOGEN, URINE: 0.2 E.U./DL
VITAMIN B-12: 474 PG/ML (ref 211–946)
VITAMIN D 25-HYDROXY: 25 NG/ML
WBC # BLD: 4.9 K/UL (ref 4.8–10.8)
WBC UA: 27 /HPF (ref 0–5)

## 2019-02-22 PROCEDURE — 99497 ADVNCD CARE PLAN 30 MIN: CPT | Performed by: NURSE PRACTITIONER

## 2019-02-22 PROCEDURE — 99215 OFFICE O/P EST HI 40 MIN: CPT | Performed by: NURSE PRACTITIONER

## 2019-02-22 RX ORDER — ACETAMINOPHEN 160 MG
2000 TABLET,DISINTEGRATING ORAL DAILY
Status: ON HOLD | COMMUNITY
End: 2019-03-26 | Stop reason: ALTCHOICE

## 2019-02-22 RX ORDER — DEXLANSOPRAZOLE 30 MG/1
30 CAPSULE, DELAYED RELEASE ORAL DAILY
Qty: 30 CAPSULE | Refills: 1 | Status: SHIPPED | OUTPATIENT
Start: 2019-02-22 | End: 2019-03-15

## 2019-02-22 RX ORDER — SUCRALFATE ORAL 1 G/10ML
1 SUSPENSION ORAL 4 TIMES DAILY
Qty: 1200 ML | Refills: 0 | Status: SHIPPED | OUTPATIENT
Start: 2019-02-22 | End: 2019-04-24

## 2019-02-22 ASSESSMENT — PATIENT HEALTH QUESTIONNAIRE - PHQ9
1. LITTLE INTEREST OR PLEASURE IN DOING THINGS: 0
2. FEELING DOWN, DEPRESSED OR HOPELESS: 0
SUM OF ALL RESPONSES TO PHQ QUESTIONS 1-9: 0
SUM OF ALL RESPONSES TO PHQ QUESTIONS 1-9: 0
SUM OF ALL RESPONSES TO PHQ9 QUESTIONS 1 & 2: 0

## 2019-02-24 LAB
ANA IGG, ELISA: NORMAL
PROLACTIN: 5.5 NG/ML (ref 2.8–26)
T3 TOTAL: 95 NG/DL (ref 80–200)

## 2019-02-25 DIAGNOSIS — R63.5 WEIGHT GAIN: ICD-10-CM

## 2019-02-25 DIAGNOSIS — R53.83 OTHER FATIGUE: ICD-10-CM

## 2019-02-25 LAB — CORTISOL - AM: 13.5 UG/DL (ref 6.2–19.4)

## 2019-02-26 ASSESSMENT — ENCOUNTER SYMPTOMS
SORE THROAT: 0
RHINORRHEA: 0
NAUSEA: 1
ABDOMINAL PAIN: 0
EYE REDNESS: 0
VOICE CHANGE: 0
DIARRHEA: 1
WHEEZING: 0
CONSTIPATION: 0
VOMITING: 1
SHORTNESS OF BREATH: 0
CHEST TIGHTNESS: 0
BLOOD IN STOOL: 0
COUGH: 0
TROUBLE SWALLOWING: 0

## 2019-02-27 ENCOUNTER — TELEPHONE (OUTPATIENT)
Dept: PRIMARY CARE CLINIC | Age: 49
End: 2019-02-27

## 2019-02-27 DIAGNOSIS — E55.9 VITAMIN D DEFICIENCY: Primary | ICD-10-CM

## 2019-02-27 RX ORDER — ERGOCALCIFEROL 1.25 MG/1
CAPSULE ORAL
Qty: 6 CAPSULE | Refills: 0 | Status: SHIPPED | OUTPATIENT
Start: 2019-02-27 | End: 2021-09-30 | Stop reason: ALTCHOICE

## 2019-02-28 ENCOUNTER — TELEPHONE (OUTPATIENT)
Dept: PRIMARY CARE CLINIC | Age: 49
End: 2019-02-28

## 2019-02-28 RX ORDER — FLUCONAZOLE 150 MG/1
TABLET ORAL
Qty: 2 TABLET | Refills: 0 | Status: ON HOLD | OUTPATIENT
Start: 2019-02-28 | End: 2019-03-26 | Stop reason: ALTCHOICE

## 2019-02-28 RX ORDER — NITROFURANTOIN 25; 75 MG/1; MG/1
100 CAPSULE ORAL 2 TIMES DAILY
Qty: 20 CAPSULE | Refills: 0 | Status: SHIPPED | OUTPATIENT
Start: 2019-02-28 | End: 2019-03-10

## 2019-03-07 ENCOUNTER — OFFICE VISIT (OUTPATIENT)
Dept: PRIMARY CARE CLINIC | Age: 49
End: 2019-03-07
Payer: COMMERCIAL

## 2019-03-07 VITALS
HEART RATE: 93 BPM | OXYGEN SATURATION: 98 % | SYSTOLIC BLOOD PRESSURE: 132 MMHG | WEIGHT: 188 LBS | TEMPERATURE: 98.4 F | DIASTOLIC BLOOD PRESSURE: 84 MMHG | HEIGHT: 65 IN | BODY MASS INDEX: 31.32 KG/M2

## 2019-03-07 DIAGNOSIS — E28.39 DECREASED ESTROGEN LEVEL: ICD-10-CM

## 2019-03-07 DIAGNOSIS — K58.0 IRRITABLE BOWEL SYNDROME WITH DIARRHEA: Primary | ICD-10-CM

## 2019-03-07 DIAGNOSIS — F32.A DEPRESSION, UNSPECIFIED DEPRESSION TYPE: ICD-10-CM

## 2019-03-07 DIAGNOSIS — R31.9 HEMATURIA, UNSPECIFIED TYPE: ICD-10-CM

## 2019-03-07 DIAGNOSIS — E55.9 VITAMIN D DEFICIENCY: ICD-10-CM

## 2019-03-07 DIAGNOSIS — N39.0 CHRONIC UTI: ICD-10-CM

## 2019-03-07 PROCEDURE — 99214 OFFICE O/P EST MOD 30 MIN: CPT | Performed by: NURSE PRACTITIONER

## 2019-03-07 RX ORDER — ESTRADIOL 0.05 MG/D
1 FILM, EXTENDED RELEASE TRANSDERMAL
Qty: 8 PATCH | Refills: 0 | Status: SHIPPED | OUTPATIENT
Start: 2019-03-07 | End: 2019-03-07 | Stop reason: SDUPTHER

## 2019-03-07 RX ORDER — DESVENLAFAXINE 25 MG/1
25 TABLET, EXTENDED RELEASE ORAL DAILY
Qty: 60 TABLET | Refills: 0 | Status: SHIPPED | OUTPATIENT
Start: 2019-03-07 | End: 2019-03-07 | Stop reason: SDUPTHER

## 2019-03-07 RX ORDER — DESVENLAFAXINE 25 MG/1
25 TABLET, EXTENDED RELEASE ORAL DAILY
Qty: 60 TABLET | Refills: 0 | Status: SHIPPED | OUTPATIENT
Start: 2019-03-07 | End: 2019-03-12 | Stop reason: ALTCHOICE

## 2019-03-07 RX ORDER — ESTRADIOL 0.05 MG/D
1 FILM, EXTENDED RELEASE TRANSDERMAL
Qty: 8 PATCH | Refills: 0 | Status: SHIPPED | OUTPATIENT
Start: 2019-03-07 | End: 2019-03-30 | Stop reason: SDUPTHER

## 2019-03-08 ENCOUNTER — TELEPHONE (OUTPATIENT)
Dept: PRIMARY CARE CLINIC | Age: 49
End: 2019-03-08

## 2019-03-11 ENCOUNTER — PATIENT MESSAGE (OUTPATIENT)
Dept: PRIMARY CARE CLINIC | Age: 49
End: 2019-03-11

## 2019-03-12 RX ORDER — DESVENLAFAXINE 50 MG/1
50 TABLET, EXTENDED RELEASE ORAL DAILY
Qty: 30 TABLET | Refills: 1 | Status: SHIPPED | OUTPATIENT
Start: 2019-03-14 | End: 2019-04-24

## 2019-03-15 DIAGNOSIS — K21.9 GASTROESOPHAGEAL REFLUX DISEASE, ESOPHAGITIS PRESENCE NOT SPECIFIED: Primary | ICD-10-CM

## 2019-03-15 DIAGNOSIS — K21.9 GASTROESOPHAGEAL REFLUX DISEASE, ESOPHAGITIS PRESENCE NOT SPECIFIED: ICD-10-CM

## 2019-03-15 RX ORDER — DESVENLAFAXINE 25 MG/1
25 TABLET, EXTENDED RELEASE ORAL DAILY
Qty: 30 TABLET | Refills: 1 | Status: SHIPPED | OUTPATIENT
Start: 2019-03-15 | End: 2019-04-24 | Stop reason: SDUPTHER

## 2019-03-15 RX ORDER — DEXLANSOPRAZOLE 60 MG/1
60 CAPSULE, DELAYED RELEASE ORAL DAILY
Qty: 30 CAPSULE | Refills: 2 | Status: SHIPPED | OUTPATIENT
Start: 2019-03-15 | End: 2019-05-20 | Stop reason: SDUPTHER

## 2019-03-15 RX ORDER — DEXLANSOPRAZOLE 30 MG/1
30 CAPSULE, DELAYED RELEASE ORAL 2 TIMES DAILY
Qty: 30 CAPSULE | Refills: 1 | Status: SHIPPED | OUTPATIENT
Start: 2019-03-15 | End: 2019-03-15 | Stop reason: ALTCHOICE

## 2019-03-17 ASSESSMENT — ENCOUNTER SYMPTOMS
VOMITING: 1
SHORTNESS OF BREATH: 0
CHEST TIGHTNESS: 0
NAUSEA: 1
DIARRHEA: 1
TROUBLE SWALLOWING: 0
RHINORRHEA: 0
COUGH: 0
CONSTIPATION: 0
BLOOD IN STOOL: 0
ABDOMINAL PAIN: 0
SORE THROAT: 0
WHEEZING: 0
VOICE CHANGE: 0
EYE REDNESS: 0

## 2019-03-26 ENCOUNTER — APPOINTMENT (OUTPATIENT)
Dept: OPERATING ROOM | Age: 49
End: 2019-03-26

## 2019-03-26 ENCOUNTER — ANESTHESIA (OUTPATIENT)
Dept: OPERATING ROOM | Age: 49
End: 2019-03-26

## 2019-03-26 ENCOUNTER — HOSPITAL ENCOUNTER (OUTPATIENT)
Age: 49
Setting detail: OUTPATIENT SURGERY
Discharge: HOME | End: 2019-03-26
Attending: INTERNAL MEDICINE | Admitting: INTERNAL MEDICINE
Payer: COMMERCIAL

## 2019-03-26 ENCOUNTER — HOSPITAL ENCOUNTER (OUTPATIENT)
Age: 49
Setting detail: SPECIMEN
Discharge: HOME | End: 2019-03-26
Payer: COMMERCIAL

## 2019-03-26 ENCOUNTER — ANESTHESIA EVENT (OUTPATIENT)
Dept: OPERATING ROOM | Age: 49
End: 2019-03-26

## 2019-03-26 VITALS
WEIGHT: 179 LBS | SYSTOLIC BLOOD PRESSURE: 104 MMHG | BODY MASS INDEX: 29.82 KG/M2 | RESPIRATION RATE: 19 BRPM | OXYGEN SATURATION: 100 % | DIASTOLIC BLOOD PRESSURE: 80 MMHG | HEART RATE: 84 BPM | HEIGHT: 65 IN

## 2019-03-26 VITALS — OXYGEN SATURATION: 94 % | SYSTOLIC BLOOD PRESSURE: 98 MMHG | DIASTOLIC BLOOD PRESSURE: 45 MMHG

## 2019-03-26 PROCEDURE — G8907 PT DOC NO EVENTS ON DISCHARG: HCPCS

## 2019-03-26 PROCEDURE — G8918 PT W/O PREOP ORDER IV AB PRO: HCPCS

## 2019-03-26 PROCEDURE — 45380 COLONOSCOPY AND BIOPSY: CPT

## 2019-03-26 PROCEDURE — 88305 TISSUE EXAM BY PATHOLOGIST: CPT

## 2019-03-26 PROCEDURE — 45380 COLONOSCOPY AND BIOPSY: CPT | Performed by: INTERNAL MEDICINE

## 2019-03-26 RX ORDER — LIDOCAINE HYDROCHLORIDE 10 MG/ML
INJECTION, SOLUTION INFILTRATION; PERINEURAL PRN
Status: DISCONTINUED | OUTPATIENT
Start: 2019-03-26 | End: 2019-03-26 | Stop reason: SDUPTHER

## 2019-03-26 RX ORDER — PROPOFOL 10 MG/ML
INJECTION, EMULSION INTRAVENOUS PRN
Status: DISCONTINUED | OUTPATIENT
Start: 2019-03-26 | End: 2019-03-26 | Stop reason: SDUPTHER

## 2019-03-26 RX ORDER — SODIUM CHLORIDE 9 MG/ML
INJECTION, SOLUTION INTRAVENOUS CONTINUOUS
Status: DISCONTINUED | OUTPATIENT
Start: 2019-03-26 | End: 2019-03-26 | Stop reason: HOSPADM

## 2019-03-26 RX ADMIN — PROPOFOL 300 MG: 10 INJECTION, EMULSION INTRAVENOUS at 11:27

## 2019-03-26 RX ADMIN — LIDOCAINE HYDROCHLORIDE 30 MG: 10 INJECTION, SOLUTION INFILTRATION; PERINEURAL at 11:27

## 2019-03-26 RX ADMIN — SODIUM CHLORIDE: 9 INJECTION, SOLUTION INTRAVENOUS at 11:13

## 2019-04-24 ENCOUNTER — OFFICE VISIT (OUTPATIENT)
Dept: PRIMARY CARE CLINIC | Age: 49
End: 2019-04-24
Payer: COMMERCIAL

## 2019-04-24 VITALS
TEMPERATURE: 98.5 F | OXYGEN SATURATION: 100 % | HEART RATE: 80 BPM | DIASTOLIC BLOOD PRESSURE: 86 MMHG | BODY MASS INDEX: 30.82 KG/M2 | HEIGHT: 65 IN | WEIGHT: 185 LBS | SYSTOLIC BLOOD PRESSURE: 133 MMHG

## 2019-04-24 DIAGNOSIS — E55.9 VITAMIN D DEFICIENCY: ICD-10-CM

## 2019-04-24 DIAGNOSIS — R53.83 OTHER FATIGUE: ICD-10-CM

## 2019-04-24 DIAGNOSIS — K21.9 GASTROESOPHAGEAL REFLUX DISEASE, ESOPHAGITIS PRESENCE NOT SPECIFIED: Primary | ICD-10-CM

## 2019-04-24 DIAGNOSIS — R68.82 DECREASED SEX DRIVE: ICD-10-CM

## 2019-04-24 DIAGNOSIS — F32.A DEPRESSION, UNSPECIFIED DEPRESSION TYPE: ICD-10-CM

## 2019-04-24 DIAGNOSIS — M19.90 ARTHRITIS: ICD-10-CM

## 2019-04-24 DIAGNOSIS — E28.39 DECREASED ESTROGEN LEVEL: ICD-10-CM

## 2019-04-24 DIAGNOSIS — R06.83 SNORING: ICD-10-CM

## 2019-04-24 PROCEDURE — 99214 OFFICE O/P EST MOD 30 MIN: CPT | Performed by: NURSE PRACTITIONER

## 2019-04-24 RX ORDER — DESVENLAFAXINE 25 MG/1
25 TABLET, EXTENDED RELEASE ORAL DAILY
Qty: 30 TABLET | Refills: 2 | Status: SHIPPED | OUTPATIENT
Start: 2019-04-24 | End: 2019-05-28 | Stop reason: SDUPTHER

## 2019-04-24 ASSESSMENT — ENCOUNTER SYMPTOMS
SHORTNESS OF BREATH: 0
TROUBLE SWALLOWING: 0
BLOOD IN STOOL: 0
VOICE CHANGE: 0
CONSTIPATION: 0
RHINORRHEA: 0
WHEEZING: 0
DIARRHEA: 0
COUGH: 0
EYE REDNESS: 0
ABDOMINAL PAIN: 0
CHEST TIGHTNESS: 0
SORE THROAT: 0
NAUSEA: 0
VOMITING: 0

## 2019-04-24 NOTE — PROGRESS NOTES
[facility]  [address] [place] [address]  [id number]  [id number]  Dept: [phone]  Dept Fax: [phone]  Loc: [phone]        Diana Phillips is a 57 y.o. female who presents today for her medical conditions/ complaints as noted below. Diana Phillips is c/o 1 Month Follow-Up (IBS, decreased estrogen, chronic uti, hematuria); Medication Check (Begin weaning off of viibryd, begin pristiq, estrogen patch, dexialnt; take xifaxin and carafate ); and Depression        Chief Complaint   Patient presents with    1 Month Follow-Up     IBS, decreased estrogen, chronic uti, hematuria    Medication Check     Begin weaning off of viibryd, begin pristiq, estrogen patch, dexialnt; take xifaxin and carafate     Depression       HPI:     HPI    Gerd: 4/24/19:  Patient states that her abdominal issues have resolved since beginning dexilant and carafate 4 times daily. Patient states that she does not have to take Carafate at this time and notes that her diarrhea has resolved. Patient states that the Xifaxan also helped with diarrhea cessation. 3/07/19: pt states that when she first started the dexilant she could a tell a difference in her symptoms. Pt states that the medication seems to wear off early in the day. Pt states that she is still dealing with frequent diarrhea and IBS D symptoms.       Depression/ Anxiety/ menopausal: 4/24/19:   Patient states that she has improved since stopping vibrating beginning Pristiq. Patient states that she was taking 50 mg of Pristiq but noted herself being down and depressed and anxious. Patient states that she then returned to the 25 mg dosage. She notes that she is better on this dosage. She states that she is still dealing with fatigue. She is curious if this could be related to sleep apnea. Patient was also started on estradiol patch. Patient was started at the 0.05 mg patch.  He states that she has had little to no improvement in her Medical: None     Non-medical: None   Tobacco Use    Smoking status: Never Smoker    Smokeless tobacco: Never Used   Substance and Sexual Activity    Alcohol use: No    Drug use: No    Sexual activity: None   Lifestyle    Physical activity:     Days per week: None     Minutes per session: None    Stress: None   Relationships    Social connections:     Talks on phone: None     Gets together: None     Attends Alevism service: None     Active member of club or organization: None     Attends meetings of clubs or organizations: None     Relationship status: None    Intimate partner violence:     Fear of current or ex partner: None     Emotionally abused: None     Physically abused: None     Forced sexual activity: None   Other Topics Concern    None   Social History Narrative    None       Family History   Problem Relation Age of Onset    Hypertension Father     Colon Cancer Neg Hx     Colon Polyps Neg Hx     Esophageal Cancer Neg Hx     Liver Cancer Neg Hx     Liver Disease Neg Hx     Rectal Cancer Neg Hx     Stomach Cancer Neg Hx        Current Outpatient Medications   Medication Sig Dispense Refill    NONFORMULARY Indications: SH thyroid compound       desvenlafaxine succinate (PRISTIQ) 25 MG TB24 extended release tablet Take 1 tablet by mouth daily 30 tablet 2    CRANBERRY PO Take by mouth daily      estradiol (VIVELLE) 0.05 MG/24HR APPLY 1 PATCH EXTERNALLY TO THE SKIN 2 TIMES A WEEK 8 patch 5    dexlansoprazole (DEXILANT) 60 MG CPDR delayed release capsule Take 1 capsule by mouth daily Tried and failed dexilant 30mg, protonix, nexium, prilosec, pepcid. 30 capsule 2    vitamin D (ERGOCALCIFEROL) 24136 units CAPS capsule Take one capsule once weekly for 6 weeks, then begin 5,000 units OTC.  6 capsule 0    fexofenadine (ALLEGRA) 180 MG tablet Take 180 mg by mouth daily      zinc gluconate 50 MG tablet Take 50 mg by mouth daily       No current facility-administered medications for this visit. Allergies   Allergen Reactions    Sulfamethoxazole-Trimethoprim Anaphylaxis    Azithromycin Hives       Lab Review not applicable  notapplicable    Subjective:   Review of Systems   Constitutional: Positive for activity change and fatigue (9 on 0-10 scale - 10 being extreamly tired. ). Negative for appetite change, fever and unexpected weight change. HENT: Negative for congestion, ear pain, nosebleeds, rhinorrhea, sore throat, trouble swallowing and voice change. Eyes: Negative for redness and visual disturbance. Respiratory: Negative for cough, chest tightness, shortness of breath and wheezing. Cardiovascular: Negative for chest pain, palpitations and leg swelling. Gastrointestinal: Negative for abdominal pain, blood in stool, constipation, diarrhea, nausea and vomiting. Endocrine: Negative for cold intolerance, heat intolerance, polydipsia, polyphagia and polyuria. Genitourinary: Negative for dysuria, frequency, urgency and vaginal pain. Musculoskeletal: Negative for arthralgias and myalgias. Skin: Negative for rash and wound. Neurological: Positive for headaches (midmorning headaches, pt notes really bad allergies. ). Negative for dizziness, speech difficulty, weakness and light-headedness. Psychiatric/Behavioral: Positive for agitation (little) and sleep disturbance (pt wakes up tired, not restful sleep. ). Negative for confusion, self-injury and suicidal ideas. The patient is nervous/anxious. Objective:     Physical Exam   Constitutional: She is oriented to person, place, and time. She appears well-developed and well-nourished. No distress. HENT:   Head: Normocephalic and atraumatic. Right Ear: External ear normal.   Left Ear: External ear normal.   Nose: Nose normal.   Mouth/Throat: Oropharynx is clear and moist. No oropharyngeal exudate. Eyes: Pupils are equal, round, and reactive to light. Conjunctivae are normal. Right eye exhibits no discharge.  Left eye exhibits no discharge. Neck: Normal range of motion. Neck supple. Cardiovascular: Normal rate, regular rhythm, normal heart sounds and intact distal pulses. No murmur heard. Pulmonary/Chest: Effort normal and breath sounds normal. No stridor. No respiratory distress. She has no wheezes. She has no rales. She exhibits no tenderness. Right breast exhibits no inverted nipple, no mass, no nipple discharge, no skin change and no tenderness. Left breast exhibits no inverted nipple, no mass, no nipple discharge, no skin change and no tenderness. Abdominal: Soft. Bowel sounds are normal. She exhibits no distension. There is no tenderness. Musculoskeletal: Normal range of motion. She exhibits no edema, tenderness or deformity. Neurological: She is alert and oriented to person, place, and time. She has normal reflexes. No cranial nerve deficit. Coordination normal.   Skin: Skin is warm and dry. No rash noted. She is not diaphoretic. No erythema. Psychiatric: She has a normal mood and affect. Her behavior is normal. Thought content normal.   Nursing note and vitals reviewed. /86   Pulse 80   Temp 98.5 °F (36.9 °C)   Ht 5' 5\" (1.651 m)   Wt 185 lb (83.9 kg)   SpO2 100%   BMI 30.79 kg/m²     Assessment:      Diagnosis Orders   1. Gastroesophageal reflux disease, esophagitis presence not specified     2. Depression, unspecified depression type     3. Vitamin D deficiency     4. Other fatigue     5. Decreased sex drive     6. Arthritis     7. Decreased estrogen level  desvenlafaxine succinate (PRISTIQ) 25 MG TB24 extended release tablet   8. Snoring  [facility]     No results found for this visit on 04/24/19. Plan:     1. Gastroesophageal reflux disease, esophagitis presence not specified    2. Depression, unspecified depression type    3. Vitamin D deficiency    4. Other fatigue    5. Decreased sex drive    6. Arthritis    7. Decreased estrogen level    8.  Snoring Discussion:   Patient will discontinue Carafate. She will keep medication on hand in case her symptoms of diarrhea do return. She will continued Exelon at bedtime. She will take thyroid medication on empty stomach in the morning 30 minutes before food with water. She will continue Pristiq 25 mg daily. Also increase her estrogen patches to 2 patches applied twice weekly. I will also refer her for sleep study. She will have this performed at [facility]. And to follow-up with patient in 2 months sooner if needed. Return in about 2 months (around 6/24/2019) for 30 min appointment. Orders Placed This Encounter   Procedures   [facility]     Referral Priority:   Routine     Referral Type:   Eval and Treat     Referral Reason:   Specialty Services Required     Requested Specialty:   Sleep Center     Number of Visits Requested:   1     Orders Placed This Encounter   Medications    desvenlafaxine succinate (PRISTIQ) 25 MG TB24 extended release tablet     Sig: Take 1 tablet by mouth daily     Dispense:  30 tablet     Refill:  2       Patient Instructions   Continue compounded thyroid. Continue prisitq    Increase estrogen patch to two patches to see if this improves symptoms. We will order sleep study. Hold carafate unless symptoms return. Patient/family given educational materials - see patient instructions. Discussed use, benefit, and side effects of prescribed medications. All patient/family questions answered and voiced understanding. Instructed to continue current medications, diet and exercise. Pt/family agreed with treatment plan. Follow up as directed and sooner if needed. Patient/ family instructed that is symptoms worsen or persist they are to contact office or report to nearest ER. They voice understanding and agreement with this plan.      Electronically signed by LENORA Perez on 4/24/2019 at 4:57 PM

## 2019-04-24 NOTE — PATIENT INSTRUCTIONS
Continue compounded thyroid. Continue prisitq    Increase estrogen patch to two patches to see if this improves symptoms. We will order sleep study. Hold carafate unless symptoms return.

## 2019-05-20 DIAGNOSIS — K21.9 GASTROESOPHAGEAL REFLUX DISEASE, ESOPHAGITIS PRESENCE NOT SPECIFIED: ICD-10-CM

## 2019-05-20 RX ORDER — DEXLANSOPRAZOLE 60 MG/1
CAPSULE, DELAYED RELEASE ORAL
Qty: 30 CAPSULE | Refills: 0 | Status: SHIPPED | OUTPATIENT
Start: 2019-05-20 | End: 2019-06-07 | Stop reason: SDUPTHER

## 2019-05-28 DIAGNOSIS — E28.39 DECREASED ESTROGEN LEVEL: ICD-10-CM

## 2019-05-28 RX ORDER — DESVENLAFAXINE 25 MG/1
25 TABLET, EXTENDED RELEASE ORAL DAILY
Qty: 15 TABLET | Refills: 0 | Status: SHIPPED | OUTPATIENT
Start: 2019-05-28 | End: 2019-05-28 | Stop reason: SDUPTHER

## 2019-05-28 RX ORDER — DESVENLAFAXINE 25 MG/1
25 TABLET, EXTENDED RELEASE ORAL DAILY
Qty: 90 TABLET | Refills: 0 | Status: SHIPPED | OUTPATIENT
Start: 2019-05-28 | End: 2019-06-07 | Stop reason: SDUPTHER

## 2019-05-28 NOTE — TELEPHONE ENCOUNTER
Patient called stating that her Pristiq has not come in through mail order yet because there was some issues with getting it filled through the mail order. Patient took her last one today and is needing a short supply until hers comes in. A 2 week supply has been sent to [facility] at .

## 2019-05-28 NOTE — TELEPHONE ENCOUNTER
Requested Prescriptions     Signed Prescriptions Disp Refills    desvenlafaxine succinate (PRISTIQ) 25 MG TB24 extended release tablet 90 tablet 0     Sig: Take 1 tablet by mouth daily     Authorizing Provider: Ayla Campos     Ordering User: Jessica Martell

## 2019-06-07 ENCOUNTER — OFFICE VISIT (OUTPATIENT)
Dept: PRIMARY CARE CLINIC | Age: 49
End: 2019-06-07
Payer: COMMERCIAL

## 2019-06-07 VITALS
OXYGEN SATURATION: 98 % | SYSTOLIC BLOOD PRESSURE: 128 MMHG | HEART RATE: 82 BPM | BODY MASS INDEX: 30.49 KG/M2 | HEIGHT: 65 IN | WEIGHT: 183 LBS | DIASTOLIC BLOOD PRESSURE: 82 MMHG | TEMPERATURE: 99 F

## 2019-06-07 DIAGNOSIS — E55.9 VITAMIN D DEFICIENCY: ICD-10-CM

## 2019-06-07 DIAGNOSIS — R53.83 OTHER FATIGUE: ICD-10-CM

## 2019-06-07 DIAGNOSIS — E66.9 CLASS 1 OBESITY WITH BODY MASS INDEX (BMI) OF 30.0 TO 30.9 IN ADULT, UNSPECIFIED OBESITY TYPE, UNSPECIFIED WHETHER SERIOUS COMORBIDITY PRESENT: ICD-10-CM

## 2019-06-07 DIAGNOSIS — R63.5 WEIGHT GAIN: ICD-10-CM

## 2019-06-07 DIAGNOSIS — K58.0 IRRITABLE BOWEL SYNDROME WITH DIARRHEA: ICD-10-CM

## 2019-06-07 DIAGNOSIS — F32.A DEPRESSION, UNSPECIFIED DEPRESSION TYPE: ICD-10-CM

## 2019-06-07 DIAGNOSIS — R06.83 SNORING: ICD-10-CM

## 2019-06-07 DIAGNOSIS — E28.39 DECREASED ESTROGEN LEVEL: ICD-10-CM

## 2019-06-07 DIAGNOSIS — K21.9 GASTROESOPHAGEAL REFLUX DISEASE, ESOPHAGITIS PRESENCE NOT SPECIFIED: ICD-10-CM

## 2019-06-07 DIAGNOSIS — R68.82 DECREASED SEX DRIVE: ICD-10-CM

## 2019-06-07 DIAGNOSIS — E04.9 THYROID ENLARGEMENT: Primary | ICD-10-CM

## 2019-06-07 DIAGNOSIS — E04.9 THYROID ENLARGEMENT: ICD-10-CM

## 2019-06-07 LAB — VITAMIN D 25-HYDROXY: 51.6 NG/ML

## 2019-06-07 PROCEDURE — 99215 OFFICE O/P EST HI 40 MIN: CPT | Performed by: NURSE PRACTITIONER

## 2019-06-07 RX ORDER — PHENTERMINE HYDROCHLORIDE 30 MG/1
30 CAPSULE ORAL EVERY MORNING
Qty: 30 CAPSULE | Refills: 0 | Status: SHIPPED | OUTPATIENT
Start: 2019-06-07 | End: 2019-06-26 | Stop reason: ALTCHOICE

## 2019-06-07 RX ORDER — DEXLANSOPRAZOLE 60 MG/1
CAPSULE, DELAYED RELEASE ORAL
Qty: 90 CAPSULE | Refills: 2 | Status: SHIPPED | OUTPATIENT
Start: 2019-06-07

## 2019-06-07 RX ORDER — DESVENLAFAXINE 25 MG/1
25 TABLET, EXTENDED RELEASE ORAL DAILY
Qty: 90 TABLET | Refills: 2 | Status: SHIPPED | OUTPATIENT
Start: 2019-06-07 | End: 2021-09-30 | Stop reason: SDUPTHER

## 2019-06-07 ASSESSMENT — ENCOUNTER SYMPTOMS
CONSTIPATION: 0
WHEEZING: 0
VOMITING: 0
NAUSEA: 0
TROUBLE SWALLOWING: 0
COUGH: 0
RHINORRHEA: 0
ABDOMINAL PAIN: 0
VOICE CHANGE: 0
EYE REDNESS: 0
CHEST TIGHTNESS: 0
SORE THROAT: 0
SHORTNESS OF BREATH: 0
BLOOD IN STOOL: 0
DIARRHEA: 0

## 2019-06-07 NOTE — PROGRESS NOTES
[facility]  [facility]  [id number]  [place] [address]  Dept: [phone]  Dept Fax: [phone]  Loc: [phone]        Marce Reza is a 57 y.o. female who presents today for her medical conditions/ complaints as noted below. Marce Reza is c/o Medication Problem (estradiol patches)        Chief Complaint   Patient presents with    Medication Problem     estradiol patches       HPI:     HPI      Gerd: 6/7/19 Stable on dexilant wishes to continue same. Pt denies GI issues at this time. 4/24/19:  Patient states that her abdominal issues have resolved since beginning dexilant and carafate 4 times daily. Patient states that she does not have to take Carafate at this time and notes that her diarrhea has resolved. Patient states that the Xifaxan also helped with diarrhea cessation. 3/07/19: pt states that when she first started the dexilant she could a tell a difference in her symptoms. Pt states that the medication seems to wear off early in the day. Pt states that she is still dealing with frequent diarrhea and IBS D symptoms.       Depression/ Anxiety/ menopausal: 6/7/19 Pt states that she is doing well on pristiq 50 mg daily. She states that her depression and anxiety is much improved. She states that she could not tolerate the hormone patches due to the skin reaction she obtained. She does not wish to change forms of estrogen at this time. 4/24/19:   Patient states that she has improved since stopping vibrating beginning Pristiq. Patient states that she was taking 50 mg of Pristiq but noted herself being down and depressed and anxious. Patient states that she then returned to the 25 mg dosage. She notes that she is better on this dosage. She states that she is still dealing with fatigue. She is curious if this could be related to sleep apnea. Patient was also started on estradiol patch. Patient was started at the 0.05 mg patch.  He states that she has had little to no improvement in her symptoms related to this patch however she has  Less overall complaints noted today. 3/07/19viibryd 40mg. Discussed genesite with pt and due to her symptoms of depression/ hot flashes we believe that she would be better served with a different medication. Pt denies SI or HI today.      Hypothyroidism: 6/7/19 Pt states that she is doing well on her compounded thyroid medication. She states that her hair is growing and her skin has improved. She notes some improvement in her energy.     4/24/19:   Patient was started on compounded thyroid medication from [facility]. She notes improvement in her overall symptoms today. She denies elevations in blood pressure or pulse. She  Wishes to continue same. 3/07/19T4 1.1, and T3 is 95, TSH 2.71, discussed symptoms of hypothyroidism with pt and she is agreed to continue same for now and we will look to changing her to compounded thyroid if symptoms do not improve.     Chronic fatigue: Pt states that she never wakes up feeling energetic. She states that she is always fatigued. She states that she has a strong family history of sleep apnea. She states that her  states that she snores. She states that she wants to sleep all the time but if she did she wouldn't feel any better. Patient reports that they have been compliant with taking medications as directed. Past Medical History:   Diagnosis Date    Anxiety     GERD (gastroesophageal reflux disease)     Hypothyroidism     Prolonged emergence from general anesthesia        Past Surgical History:   Procedure Laterality Date    BREAST SURGERY Left     cyst removed    CHOLECYSTECTOMY  2016     DR. GIPSON    COLONOSCOPY N/A 3/26/2019    Dr CAMILA Cedeno-Diverticular disease-[facility]    EGD      Dr. Jeffry Andre EGD TRANSORAL BIOPSY SINGLE/MULTIPLE N/A 11/13/2018    Dr CAMILA Cedeno-w/dilation over wire-48 Portuguese-hiatal hernia, esophageal stricture-Gastritis    SKIN BIOPSY Right     leg- birth jameel removed    UPPER GASTROINTESTINAL ENDOSCOPY  11/13/2018    Dr CAMILA Cedeno-w/dilation over wire-48 English-hiatal hernia, esophageal stricture-Gastritis       Social History     Socioeconomic History    Marital status:      Spouse name: None    Number of children: None    Years of education: None    Highest education level: None   Occupational History    None   Social Needs    Financial resource strain: None    Food insecurity:     Worry: None     Inability: None    Transportation needs:     Medical: None     Non-medical: None   Tobacco Use    Smoking status: Never Smoker    Smokeless tobacco: Never Used   Substance and Sexual Activity    Alcohol use: No    Drug use: No    Sexual activity: None   Lifestyle    Physical activity:     Days per week: None     Minutes per session: None    Stress: None   Relationships    Social connections:     Talks on phone: None     Gets together: None     Attends Sikhism service: None     Active member of club or organization: None     Attends meetings of clubs or organizations: None     Relationship status: None    Intimate partner violence:     Fear of current or ex partner: None     Emotionally abused: None     Physically abused: None     Forced sexual activity: None   Other Topics Concern    None   Social History Narrative    None       Family History   Problem Relation Age of Onset    Hypertension Father     Colon Cancer Neg Hx     Colon Polyps Neg Hx     Esophageal Cancer Neg Hx     Liver Cancer Neg Hx     Liver Disease Neg Hx     Rectal Cancer Neg Hx     Stomach Cancer Neg Hx        Current Outpatient Medications   Medication Sig Dispense Refill    desvenlafaxine succinate (PRISTIQ) 25 MG TB24 extended release tablet Take 1 tablet by mouth daily 90 tablet 2    dexlansoprazole (DEXILANT) 60 MG CPDR delayed release capsule TAKE 1 CAPSULE BY MOUTH DAILY 90 capsule 2    phentermine 30 MG capsule Take 1 capsule by mouth every morning for 30 days. 30 capsule 0    NONFORMULARY Indications: SH thyroid compound       CRANBERRY PO Take by mouth daily      vitamin D (ERGOCALCIFEROL) 77672 units CAPS capsule Take one capsule once weekly for 6 weeks, then begin 5,000 units OTC. 6 capsule 0    fexofenadine (ALLEGRA) 180 MG tablet Take 180 mg by mouth daily      zinc gluconate 50 MG tablet Take 50 mg by mouth daily      estradiol (VIVELLE) 0.05 MG/24HR APPLY 1 PATCH EXTERNALLY TO THE SKIN 2 TIMES A WEEK 8 patch 5     No current facility-administered medications for this visit. Allergies   Allergen Reactions    Sulfamethoxazole-Trimethoprim Anaphylaxis    Azithromycin Hives       Lab Review not applicable  notapplicable    Subjective:   Review of Systems   Constitutional: Positive for activity change and fatigue (9 on 0-10 scale - 10 being extreamly tired. ). Negative for appetite change, fever and unexpected weight change. HENT: Negative for congestion, ear pain, nosebleeds, rhinorrhea, sore throat, trouble swallowing and voice change. Eyes: Negative for redness and visual disturbance. Respiratory: Negative for cough, chest tightness, shortness of breath and wheezing. Cardiovascular: Negative for chest pain, palpitations and leg swelling. Gastrointestinal: Negative for abdominal pain, blood in stool, constipation, diarrhea, nausea and vomiting. Endocrine: Negative for cold intolerance, heat intolerance, polydipsia, polyphagia and polyuria. Genitourinary: Negative for dysuria, frequency, urgency and vaginal pain. Musculoskeletal: Negative for arthralgias and myalgias. Skin: Negative for rash and wound. Neurological: Positive for headaches (midmorning headaches, pt notes really bad allergies. ). Negative for dizziness, speech difficulty, weakness and light-headedness. Psychiatric/Behavioral: Positive for sleep disturbance (pt wakes up tired, not restful sleep. ).  Negative for agitation, estrogen level  desvenlafaxine succinate (PRISTIQ) 25 MG TB24 extended release tablet    [place] ADVANCED CARE PLAN FACE TO FACE, 1ST 30MIN   3. Gastroesophageal reflux disease, esophagitis presence not specified  dexlansoprazole (DEXILANT) 60 MG CPDR delayed release capsule    [place] ADVANCED CARE PLAN FACE TO FACE, 1ST 30MIN   4. Vitamin D deficiency  Vitamin D 25 Hydroxy    [place] ADVANCED CARE PLAN FACE TO FACE, 1ST 30MIN   5. Class 1 obesity with body mass index (BMI) of 30.0 to 30.9 in adult, unspecified obesity type, unspecified whether serious comorbidity present  phentermine 30 MG capsule    [place] ADVANCED CARE PLAN FACE TO FACE, 1ST 30MIN   6. Depression, unspecified depression type  [facility] ADVANCED CARE PLAN FACE TO FACE, 1ST 30MIN   7. Other fatigue  Sleep Study with PAP Titration    [facility] ADVANCED CARE PLAN FACE TO FACE, 1ST 30MIN   8. Decreased sex drive  [facility] ADVANCED CARE PLAN FACE TO FACE, 1ST 30MIN   9. Snoring  Sleep Study with PAP Titration    [facility] ADVANCED CARE PLAN FACE TO FACE, 1ST 30MIN   10. Irritable bowel syndrome with diarrhea  [facility] ADVANCED CARE PLAN FACE TO FACE, 1ST 30MIN   11. Weight gain  [facility] ADVANCED CARE PLAN FACE TO FACE, 1ST 30MIN     No results found for this visit on 06/07/19. Plan:     1. Thyroid enlargement    2. Decreased estrogen level    3. Gastroesophageal reflux disease, esophagitis presence not specified    4. Vitamin D deficiency    5. Class 1 obesity with body mass index (BMI) of 30.0 to 30.9 in adult, unspecified obesity type, unspecified whether serious comorbidity present    6. Depression, unspecified depression type    7. Other fatigue    8. Decreased sex drive    9. Snoring    10. Irritable bowel syndrome with diarrhea    11. Weight gain      More than 50% of the time was spent counseling and coordinating care for a total time of greater than 30 min face to face. Discussion: Plan to obtain sleep study for evaluation of possible sleep apnea.  Patient has strong family history of sleep apnea. Due to patient's unrestful sleep and to sleep all the time I am concerned that she could also have sleep apnea. She will continue her Pristiq. She will additionally continue her thyroid medication. She will stop her estrogen patches related to the contact dermatitis she has developed from the patches. She will begin phentermine 30 mg daily to help with weight loss. Patient believes that her fatigue and overall well-being would improve if she were to be able to lose some weight. Patient's vital signs and blood pressure are stable. Her anxiety is under control. We will follow-up in 2 weeks with patient for recheck. Return in about 1 month (around 7/7/2019) for 30 min appointment, medication recheck.   Orders Placed This Encounter   Procedures    US Thyroid     Standing Status:   Future     Number of Occurrences:   1     Standing Expiration Date:   6/6/2020     Order Specific Question:   Reason for exam:     Answer:   Thyroid nodule/ enlarged thyroid    Vitamin D 25 Hydroxy     Standing Status:   Future     Number of Occurrences:   1     Standing Expiration Date:   6/7/2020    Sleep Study with PAP Titration     Standing Status:   Future     Standing Expiration Date:   6/7/2020     Order Specific Question:   Sleep Study Titration Type     Answer:   Split Night Study (Baseline followed by PAP Titration)     Order Specific Question:   Location For Sleep Study     Answer:   [place]     Order Specific Question:   Select Sleep Lab Location     Answer:   [facility]    [place] ADVANCED CARE PLAN FACE TO FACE, 1ST 30MIN     Orders Placed This Encounter   Medications    desvenlafaxine succinate (PRISTIQ) 25 MG TB24 extended release tablet     Sig: Take 1 tablet by mouth daily     Dispense:  90 tablet     Refill:  2    dexlansoprazole (DEXILANT) 60 MG CPDR delayed release capsule     Sig: TAKE 1 CAPSULE BY MOUTH DAILY     Dispense:  90 capsule     Refill:  2    phentermine 30 MG capsule Sig: Take 1 capsule by mouth every morning for 30 days. Dispense:  30 capsule     Refill:  0       Patient Instructions   If doing well at next appointment will follow up in 2 months. Begin phenteramine and monitor weight and blood pressure. Have labs drawn on the 2nd floor for vit d. We will schedule thyroid ultrasound. Patient/family given educational materials - see patient instructions. Discussed use, benefit, and side effects of prescribed medications. All patient/family questions answered and voiced understanding. Instructed to continue current medications, diet and exercise. Pt/family agreed with treatment plan. Follow up as directed and sooner if needed. Patient/ family instructed that is symptoms worsen or persist they are to contact office or report to nearest ER. They voice understanding and agreement with this plan.      Electronically signed by LENORA Almeida on 6/7/2019 at 12:07 PM

## 2019-06-14 ENCOUNTER — HOSPITAL ENCOUNTER (OUTPATIENT)
Dept: ULTRASOUND IMAGING | Age: 49
Discharge: HOME | End: 2019-06-14
Payer: COMMERCIAL

## 2019-06-14 DIAGNOSIS — E04.9 THYROID ENLARGEMENT: ICD-10-CM

## 2019-06-14 PROCEDURE — 76536 US EXAM OF HEAD AND NECK: CPT

## 2019-06-26 ENCOUNTER — OFFICE VISIT (OUTPATIENT)
Dept: PRIMARY CARE CLINIC | Age: 49
End: 2019-06-26
Payer: COMMERCIAL

## 2019-06-26 VITALS
SYSTOLIC BLOOD PRESSURE: 122 MMHG | DIASTOLIC BLOOD PRESSURE: 78 MMHG | WEIGHT: 182 LBS | OXYGEN SATURATION: 98 % | BODY MASS INDEX: 30.32 KG/M2 | HEART RATE: 73 BPM | TEMPERATURE: 98 F | HEIGHT: 65 IN

## 2019-06-26 DIAGNOSIS — R63.5 WEIGHT GAIN: ICD-10-CM

## 2019-06-26 DIAGNOSIS — R06.83 SNORING: ICD-10-CM

## 2019-06-26 DIAGNOSIS — F32.A DEPRESSION, UNSPECIFIED DEPRESSION TYPE: ICD-10-CM

## 2019-06-26 DIAGNOSIS — K58.0 IRRITABLE BOWEL SYNDROME WITH DIARRHEA: ICD-10-CM

## 2019-06-26 DIAGNOSIS — R53.83 OTHER FATIGUE: ICD-10-CM

## 2019-06-26 DIAGNOSIS — E66.9 CLASS 1 OBESITY WITH BODY MASS INDEX (BMI) OF 30.0 TO 30.9 IN ADULT, UNSPECIFIED OBESITY TYPE, UNSPECIFIED WHETHER SERIOUS COMORBIDITY PRESENT: Primary | ICD-10-CM

## 2019-06-26 DIAGNOSIS — F41.9 ANXIETY: ICD-10-CM

## 2019-06-26 PROCEDURE — 99214 OFFICE O/P EST MOD 30 MIN: CPT | Performed by: NURSE PRACTITIONER

## 2019-06-26 ASSESSMENT — ENCOUNTER SYMPTOMS
BLOOD IN STOOL: 0
COUGH: 0
EYE REDNESS: 0
CHEST TIGHTNESS: 0
TROUBLE SWALLOWING: 0
CONSTIPATION: 0
VOICE CHANGE: 0
ABDOMINAL PAIN: 0
DIARRHEA: 0
SORE THROAT: 0
VOMITING: 0
WHEEZING: 0
SHORTNESS OF BREATH: 0
RHINORRHEA: 0
NAUSEA: 0

## 2019-06-26 NOTE — PROGRESS NOTES
obtained. She does not wish to change forms of estrogen at this time. 4/24/19:   Patient states that she has improved since stopping vibrating beginning Pristiq. Patient states that she was taking 50 mg of Pristiq but noted herself being down and depressed and anxious. Patient states that she then returned to the 25 mg dosage. She notes that she is better on this dosage. She states that she is still dealing with fatigue. She is curious if this could be related to sleep apnea. Patient was also started on estradiol patch. Patient was started at the 0.05 mg patch. He states that she has had little to no improvement in her symptoms related to this patch however she has  Less overall complaints noted today. 3/07/19viibryd 40mg. Discussed genesite with pt and due to her symptoms of depression/ hot flashes we believe that she would be better served with a different medication. Pt denies SI or HI today.      Hypothyroidism:   6/26/19 - Pt states that she has improved on new thyroid medication. Pt hair has improved. She states that her skin has also improved. 6/7/19 Pt states that she is doing well on her compounded thyroid medication. She states that her hair is growing and her skin has improved. She notes some improvement in her energy.     4/24/19:   Patient was started on compounded thyroid medication from [facility]. She notes improvement in her overall symptoms today. She denies elevations in blood pressure or pulse. She  Wishes to continue same. 3/07/19T4 1.1, and T3 is 95, TSH 2.71, discussed symptoms of hypothyroidism with pt and she is agreed to continue same for now and we will look to changing her to compounded thyroid if symptoms do not improve.     Chronic fatigue:  6/26/19 - Much improved on prisitq. Has not restarted the estrogen patch. 6/7/19 -Pt states that she never wakes up feeling energetic. She states that she is always fatigued.  She states that she has a strong family history of sleep apnea. She states that her  states that she snores. She states that she wants to sleep all the time but if she did she wouldn't feel any better. Obesity: Pt states that the phentermine 30mg dosage has caused her to be emotional. She states that she took the full dose last week and cried at work. She states that her vitals have been stable and she has not had any side effects other than being more emotional.  She would like to decrease the dose if possible. Vitals 6/26/2019 6/7/2019 5/65/3800   SYSTOLIC 448 [id number] 555   DIASTOLIC 78 82 86   Pulse 73 82 80   Temp 98 99 98.5   Resp      SpO2 98 98 100   Weight 182 lb 183 lb 185 lb       Sleep study: Pt states that her sleep study in August.  Plan to obtain sleep study for evaluation of possible sleep apnea. Patient has strong family history of sleep apnea. Due to patient's unrestful sleep and to sleep all the time I am concerned that she could also have sleep apnea. Patient reports that they have been compliant with taking medications as directed. Past Medical History:   Diagnosis Date    Anxiety     GERD (gastroesophageal reflux disease)     Hypothyroidism     Prolonged emergence from general anesthesia        Past Surgical History:   Procedure Laterality Date    BREAST SURGERY Left     cyst removed    CHOLECYSTECTOMY  2016     DR. GIPSON    COLONOSCOPY N/A 3/26/2019    Dr CAMILA Cedeno-[facility]vert[facility]    EGD      Dr. Chavez Polo EGD TRANSORAL BIOPSY SINGLE/MULTIPLE N/A 11/13/2018    Dr CAMILA Cedeno-w/dilation over wire-50 [place]-hiatal hernia, esophageal stricture-Gastritis    SKIN BIOPSY Right     leg- birth jameel removed    UPPER GASTROINTESTINAL ENDOSCOPY  11/13/2018    Dr CAMILA Cedeno-w/dilation over wire-50 [place]-hiatal hernia, esophageal stricture-Gastritis       Social History     Socioeconomic History    Marital status:      Spouse name: None    Number of children: None    Years of education: None  Highest education level: None   Occupational History    None   Social Needs    Financial resource strain: None    Food insecurity:     Worry: None     Inability: None    Transportation needs:     Medical: None     Non-medical: None   Tobacco Use    Smoking status: Never Smoker    Smokeless tobacco: Never Used   Substance and Sexual Activity    Alcohol use: No    Drug use: No    Sexual activity: None   Lifestyle    Physical activity:     Days per week: None     Minutes per session: None    Stress: None   Relationships    Social connections:     Talks on phone: None     Gets together: None     Attends [employer] service: None     Active member of club or organization: None     Attends meetings of clubs or organizations: None     Relationship status: None    Intimate partner violence:     Fear of current or ex partner: None     Emotionally abused: None     Physically abused: None     Forced sexual activity: None   Other Topics Concern    None   Social History Narrative    None       Family History   Problem Relation Age of Onset    Hypertension Father     Colon Cancer Neg Hx     Colon Polyps Neg Hx     Esophageal Cancer Neg Hx     Liver Cancer Neg Hx     Liver Disease Neg Hx     Rectal Cancer Neg Hx     Stomach Cancer Neg Hx        Current Outpatient Medications   Medication Sig Dispense Refill    Phentermine HCl 30 MG TBDP Take 30 mg by mouth daily for 30 days. Take 1/2 - 1 tablet daily. 30 tablet 0    desvenlafaxine succinate (PRISTIQ) 25 MG TB24 extended release tablet Take 1 tablet by mouth daily 90 tablet 2    dexlansoprazole (DEXILANT) 60 MG CPDR delayed release capsule TAKE 1 CAPSULE BY MOUTH DAILY 90 capsule 2    NONFORMULARY Indications: SH thyroid compound       CRANBERRY PO Take by mouth daily      vitamin D (ERGOCALCIFEROL) 78377 units CAPS capsule Take one capsule once weekly for 6 weeks, then begin 5,000 units OTC.  6 capsule 0    fexofenadine (ALLEGRA) 180 MG tablet Take 180 mg by mouth daily      zinc gluconate 50 MG tablet Take 50 mg by mouth daily      estradiol (VIVELLE) 0.05 MG/24HR APPLY 1 PATCH EXTERNALLY TO THE SKIN 2 TIMES A WEEK 8 patch 5     No current facility-administered medications for this visit. Allergies   Allergen Reactions    Sulfamethoxazole-Trimethoprim Anaphylaxis    Azithromycin Hives       Lab Review not applicable  notapplicable    Subjective:   Review of Systems   Constitutional: Positive for activity change and fatigue (improved). Negative for appetite change, fever and unexpected weight change. HENT: Negative for congestion, ear pain, nosebleeds, rhinorrhea, sore throat, trouble swallowing and voice change. Eyes: Negative for redness and visual disturbance. Respiratory: Negative for cough, chest tightness, shortness of breath and wheezing. Cardiovascular: Negative for chest pain, palpitations and leg swelling. Gastrointestinal: Negative for abdominal pain, blood in stool, constipation, diarrhea, nausea and vomiting. Endocrine: Negative for cold intolerance, heat intolerance, polydipsia, polyphagia and polyuria. Genitourinary: Negative for dysuria, frequency, urgency and vaginal pain. Musculoskeletal: Negative for arthralgias and myalgias. Skin: Negative for rash and wound. Neurological: Positive for headaches (midmorning headaches, pt notes really bad allergies. ). Negative for dizziness, speech difficulty, weakness and light-headedness. Psychiatric/Behavioral: Positive for sleep disturbance (pt wakes up tired, not restful sleep. ). Negative for agitation, confusion, self-injury and suicidal ideas. The patient is not nervous/anxious. Objective:     Physical Exam   Constitutional: She is oriented to person, place, and time. She appears well-developed and well-nourished. No distress. HENT:   Head: Normocephalic and atraumatic.    Right Ear: External ear normal.   Left Ear: External ear normal.   Nose: Nose normal.   Mouth/Throat: Oropharynx is clear and moist. No oropharyngeal exudate. Eyes: Pupils are equal, round, and reactive to light. Conjunctivae are normal. Right eye exhibits no discharge. Left eye exhibits no discharge. Neck: Normal range of motion. Neck supple. Thyromegaly present. Cardiovascular: Normal rate, regular rhythm, normal heart sounds and intact distal pulses. No murmur heard. Pulmonary/Chest: Effort normal and breath sounds normal. No stridor. No respiratory distress. She has no wheezes. She has no rales. She exhibits no tenderness. Right breast exhibits no inverted nipple, no mass, no nipple discharge, no skin change and no tenderness. Left breast exhibits no inverted nipple, no mass, no nipple discharge, no skin change and no tenderness. Abdominal: Soft. Bowel sounds are normal. She exhibits no distension. There is no tenderness. Musculoskeletal: Normal range of motion. She exhibits no edema, tenderness or deformity. Neurological: She is alert and oriented to person, place, and time. She has normal reflexes. No cranial nerve deficit. Coordination normal.   Skin: Skin is warm and dry. No rash noted. She is not diaphoretic. No erythema. Psychiatric: She has a normal mood and affect. Her behavior is normal. Thought content normal.   Nursing note and vitals reviewed. /78   Pulse 73   Temp 98 °F (36.7 °C)   Ht 5' 5\" (1.651 m)   Wt 182 lb (82.6 kg)   SpO2 98%   BMI 30.29 kg/m²     Assessment:      Diagnosis Orders   1. Class 1 obesity with body mass index (BMI) of 30.0 to 30.9 in adult, unspecified obesity type, unspecified whether serious comorbidity present  Phentermine HCl 30 MG TBDP   2. Depression, unspecified depression type     3. Anxiety     4. Other fatigue     5. Snoring     6. Irritable bowel syndrome with diarrhea     7. Weight gain       No results found for this visit on 06/26/19. Plan:     1.  Class 1 obesity with body mass index (BMI) of 30.0 to 30.9 in adult, unspecified obesity type, unspecified whether serious comorbidity present    2. Depression, unspecified depression type    3. Anxiety    4. Other fatigue    5. Snoring    6. Irritable bowel syndrome with diarrhea    7. Weight gain          Return in about 1 month (around 7/26/2019). No orders of the defined types were placed in this encounter. Orders Placed This Encounter   Medications    Phentermine HCl 30 MG TBDP     Sig: Take 30 mg by mouth daily for 30 days. Take 1/2 - 1 tablet daily. Dispense:  30 tablet     Refill:  0       Patient Instructions   Take phenteramine 30mg tablet 1/2 - 1 tablet daily. Follow 1-2 months. Patient/family given educational materials - see patient instructions. Discussed use, benefit, and side effects of prescribed medications. All patient/family questions answered and voiced understanding. Instructed to continue current medications, diet and exercise. Pt/family agreed with treatment plan. Follow up as directed and sooner if needed. Patient/ family instructed that is symptoms worsen or persist they are to contact office or report to nearest ER. They voice understanding and agreement with this plan.      Electronically signed by LENORA Almeida on 6/26/2019 at 12:07 PM

## 2019-07-01 ENCOUNTER — TELEPHONE (OUTPATIENT)
Dept: PRIMARY CARE CLINIC | Age: 49
End: 2019-07-01

## 2019-07-01 NOTE — TELEPHONE ENCOUNTER
Patient called stating that the Phentermine rx for 30mg dose not come in a tablet so she is not able to cut this is half. Pt wants to know what needs to be done to get this medication.

## 2019-07-02 DIAGNOSIS — E66.9 CLASS 1 OBESITY WITH BODY MASS INDEX (BMI) OF 30.0 TO 30.9 IN ADULT, UNSPECIFIED OBESITY TYPE, UNSPECIFIED WHETHER SERIOUS COMORBIDITY PRESENT: Primary | ICD-10-CM

## 2019-07-02 RX ORDER — PHENTERMINE HYDROCHLORIDE 37.5 MG/1
37.5 TABLET ORAL
Qty: 30 TABLET | Refills: 0 | Status: SHIPPED | OUTPATIENT
Start: 2019-07-02 | End: 2019-08-01

## 2019-08-02 ENCOUNTER — HOSPITAL ENCOUNTER (OUTPATIENT)
Dept: SLEEP CENTER | Age: 49
Discharge: HOME | End: 2019-08-04
Payer: COMMERCIAL

## 2019-08-02 PROCEDURE — 95810 POLYSOM 6/> YRS 4/> PARAM: CPT

## 2019-08-02 PROCEDURE — 95810 POLYSOM 6/> YRS 4/> PARAM: CPT | Performed by: PSYCHIATRY & NEUROLOGY

## 2019-08-18 DIAGNOSIS — E28.39 DECREASED ESTROGEN LEVEL: ICD-10-CM

## 2019-08-19 RX ORDER — DESVENLAFAXINE 25 MG/1
25 TABLET, EXTENDED RELEASE ORAL DAILY
Qty: 90 TABLET | Refills: 0 | Status: SHIPPED | OUTPATIENT
Start: 2019-08-19 | End: 2021-09-30 | Stop reason: ALTCHOICE

## 2019-09-01 DIAGNOSIS — G47.33 SLEEP APNEA, OBSTRUCTIVE: Primary | ICD-10-CM

## 2019-09-03 ENCOUNTER — TRANSCRIBE ORDERS (OUTPATIENT)
Dept: ADMINISTRATIVE | Facility: HOSPITAL | Age: 49
End: 2019-09-03

## 2019-09-03 DIAGNOSIS — N60.02 BREAST CYST, LEFT: Primary | ICD-10-CM

## 2019-09-06 ENCOUNTER — HOSPITAL ENCOUNTER (OUTPATIENT)
Dept: MAMMOGRAPHY | Facility: HOSPITAL | Age: 49
Discharge: HOME | End: 2019-09-06
Admitting: SPECIALIST

## 2019-09-06 ENCOUNTER — HOSPITAL ENCOUNTER (OUTPATIENT)
Dept: ULTRASOUND IMAGING | Facility: HOSPITAL | Age: 49
Discharge: HOME | End: 2019-09-06

## 2019-09-06 DIAGNOSIS — N60.02 BREAST CYST, LEFT: ICD-10-CM

## 2019-09-06 PROCEDURE — G0279 TOMOSYNTHESIS, MAMMO: HCPCS

## 2019-09-06 PROCEDURE — 76642 ULTRASOUND BREAST LIMITED: CPT

## 2019-09-06 PROCEDURE — 77066 DX MAMMO INCL CAD BI: CPT

## 2019-09-11 ENCOUNTER — TRANSCRIBE ORDERS (OUTPATIENT)
Dept: ADMINISTRATIVE | Facility: HOSPITAL | Age: 49
End: 2019-09-11

## 2019-09-11 DIAGNOSIS — Z12.31 ENCOUNTER FOR SCREENING MAMMOGRAM FOR MALIGNANT NEOPLASM OF BREAST: Primary | ICD-10-CM

## 2019-09-13 ENCOUNTER — HOSPITAL ENCOUNTER (OUTPATIENT)
Dept: SLEEP CENTER | Age: 49
Discharge: HOME | End: 2019-09-15
Payer: COMMERCIAL

## 2019-09-13 PROCEDURE — 95811 POLYSOM 6/>YRS CPAP 4/> PARM: CPT | Performed by: PSYCHIATRY & NEUROLOGY

## 2019-09-13 PROCEDURE — 95811 POLYSOM 6/>YRS CPAP 4/> PARM: CPT

## 2019-10-02 ENCOUNTER — APPOINTMENT (OUTPATIENT)
Dept: MAMMOGRAPHY | Facility: HOSPITAL | Age: 49
End: 2019-10-02
Attending: SPECIALIST

## 2019-10-12 DIAGNOSIS — G47.33 SLEEP APNEA, OBSTRUCTIVE: Primary | ICD-10-CM

## 2019-12-23 ENCOUNTER — OFFICE VISIT (OUTPATIENT)
Dept: NEUROLOGY | Age: 49
End: 2019-12-23
Payer: COMMERCIAL

## 2019-12-23 VITALS
HEART RATE: 87 BPM | DIASTOLIC BLOOD PRESSURE: 83 MMHG | BODY MASS INDEX: 30.16 KG/M2 | HEIGHT: 65 IN | WEIGHT: 181 LBS | SYSTOLIC BLOOD PRESSURE: 133 MMHG | OXYGEN SATURATION: 97 %

## 2019-12-23 DIAGNOSIS — G47.33 OBSTRUCTIVE SLEEP APNEA: Primary | ICD-10-CM

## 2019-12-23 DIAGNOSIS — Z99.89 CPAP (CONTINUOUS POSITIVE AIRWAY PRESSURE) DEPENDENCE: ICD-10-CM

## 2019-12-23 PROCEDURE — 99214 OFFICE O/P EST MOD 30 MIN: CPT | Performed by: PHYSICIAN ASSISTANT

## 2019-12-23 RX ORDER — DESVENLAFAXINE 25 MG/1
25 TABLET, EXTENDED RELEASE ORAL DAILY
COMMUNITY

## 2019-12-23 RX ORDER — AMOXICILLIN 500 MG
CAPSULE ORAL DAILY
COMMUNITY

## 2020-02-20 ENCOUNTER — OFFICE VISIT (OUTPATIENT)
Dept: OBSTETRICS AND GYNECOLOGY | Facility: CLINIC | Age: 50
End: 2020-02-20

## 2020-02-20 VITALS
SYSTOLIC BLOOD PRESSURE: 128 MMHG | DIASTOLIC BLOOD PRESSURE: 80 MMHG | WEIGHT: 178 LBS | HEIGHT: 65 IN | BODY MASS INDEX: 29.66 KG/M2

## 2020-02-20 DIAGNOSIS — Z98.890 H/O BENIGN BREAST BIOPSY: ICD-10-CM

## 2020-02-20 DIAGNOSIS — Z01.419 WELL WOMAN EXAM WITH ROUTINE GYNECOLOGICAL EXAM: Primary | ICD-10-CM

## 2020-02-20 DIAGNOSIS — N95.1 PERIMENOPAUSE: ICD-10-CM

## 2020-02-20 PROCEDURE — 87624 HPV HI-RISK TYP POOLED RSLT: CPT | Performed by: NURSE PRACTITIONER

## 2020-02-20 PROCEDURE — G0123 SCREEN CERV/VAG THIN LAYER: HCPCS | Performed by: NURSE PRACTITIONER

## 2020-02-20 PROCEDURE — 99396 PREV VISIT EST AGE 40-64: CPT | Performed by: NURSE PRACTITIONER

## 2020-02-20 RX ORDER — CALCIUM CARBONATE 260MG(650)
TABLET,CHEWABLE ORAL
COMMUNITY
End: 2022-09-01

## 2020-02-20 RX ORDER — DESVENLAFAXINE 25 MG/1
50 TABLET, EXTENDED RELEASE ORAL DAILY
COMMUNITY
Start: 2019-06-07 | End: 2022-09-01

## 2020-02-20 RX ORDER — DEXLANSOPRAZOLE 60 MG/1
CAPSULE, DELAYED RELEASE ORAL
COMMUNITY
Start: 2019-06-07

## 2020-02-20 NOTE — PROGRESS NOTES
Bre Birch is a 49 y.o.      Chief Complaint   Patient presents with   • Gynecologic Exam     pt here for annual exam. Last pap 2019, normal. Last mamm and US of Breast 2019.           CAN Díaz is in for gyn exam.  She had an ablation for menorrhagia and stopped having periods but has noticed a few days of spotting recently. She had a T.L.  She has hypothyroidism that is managed by Dr. Berry.  She has no sig.hot flashes.  She has had 1 benign breast biopsy Dr. Thomas.    The following portions of the patient's history were reviewed and updated as appropriate:vital signs, allergies, current medications, past family history, past medical history, past social history, past surgical history and problem list.      Current Outpatient Medications:   •  cholecalciferol (VITAMIN D3) 1000 units tablet, Take 1,000 Units by mouth Daily., Disp: , Rfl:   •  CRANBERRY PO, Take  by mouth., Disp: , Rfl:   •  Desvenlafaxine Succinate ER 25 MG tablet sustained-release 24 hour, Take 25 mg by mouth., Disp: , Rfl:   •  dexlansoprazole (DEXILANT) 60 MG capsule, TAKE 1 CAPSULE BY MOUTH DAILY, Disp: , Rfl:   •  fexofenadine (ALLEGRA) 180 MG tablet, Take 180 mg by mouth Daily., Disp: , Rfl:   •  levothyroxine (SYNTHROID, LEVOTHROID) 75 MCG tablet, Take 75 mcg by mouth Daily., Disp: , Rfl:   •  Omega-3 Fatty Acids (FISH OIL) 1000 MG capsule capsule, Take 2,000 mg by mouth Daily With Breakfast., Disp: , Rfl:   •  Zinc 10 MG lozenge, zinc, Disp: , Rfl:     Review of Systems   Constitutional: Positive for fatigue. Negative for activity change and unexpected weight loss.   HENT: Negative for congestion, drooling, ear pain and mouth sores.    Eyes: Negative for blurred vision, redness and itching.   Respiratory: Negative for cough and chest tightness.    Cardiovascular: Negative for chest pain.   Gastrointestinal: Positive for GERD. Negative for blood in stool, constipation and diarrhea.        Hiatal Hernia  "  Endocrine: Negative for cold intolerance and heat intolerance.        Hypothyroidism  History of .4 cm thyroid nodule   Genitourinary: Negative for difficulty urinating, dyspareunia, frequency, pelvic pain and vaginal discharge. Vaginal bleeding: has no periods since ablation, occ light spotting.   Musculoskeletal: Negative for arthralgias, back pain, neck pain and neck stiffness.   Skin: Negative for color change and rash.   Neurological: Negative for dizziness, numbness and headache.   Psychiatric/Behavioral: Positive for sleep disturbance (patient had sleep study). Negative for suicidal ideas. The patient is not nervous/anxious.          Objective      /80   Ht 165.1 cm (65\")   Wt 80.7 kg (178 lb)   Breastfeeding No   BMI 29.62 kg/m²       Physical Exam   Constitutional: She is oriented to person, place, and time. She appears well-developed and well-nourished. No distress.   HENT:   Head: Normocephalic and atraumatic.   Eyes: Right eye exhibits no discharge. Left eye exhibits no discharge.   Neck: Normal range of motion. Neck supple.   Cardiovascular: Normal rate and regular rhythm.   No murmur heard.  Pulmonary/Chest: Effort normal and breath sounds normal. Right breast exhibits no inverted nipple, no mass and no nipple discharge. Left breast exhibits no inverted nipple, no mass and no nipple discharge. No breast tenderness or discharge.   fibrocystic   Abdominal: Soft. She exhibits no distension. There is no tenderness.   Genitourinary: Vagina normal and uterus normal. Pelvic exam was performed with patient supine. There is no tenderness or lesion on the right labia. There is no tenderness or lesion on the left labia. Cervix does not exhibit motion tenderness, discharge or friability. Right adnexum displays no tenderness and no fullness. Left adnexum displays no tenderness and no fullness. No tenderness or bleeding in the vagina. No vaginal discharge found.   Musculoskeletal: Normal range of motion. " She exhibits no edema.   Neurological: She is alert and oriented to person, place, and time.   Skin: Skin is warm and dry.   Psychiatric: She has a normal mood and affect. Her behavior is normal. Judgment normal.   Nursing note and vitals reviewed.       Assessment/Plan     Bre was seen today for gynecologic exam.    Diagnoses and all orders for this visit:    Well woman exam with routine gynecological exam  Comments:  History of ablation and has no periods, occ. few days of light spotting  Orders:  -     Liquid-based Pap Smear, Screening  -     HPV DNA Probe, Direct - ThinPrep Vial,; Future  -     HPV DNA Probe, Direct - ThinPrep Vial, Cervix, Endocervix    Perimenopause  Comments:  no sig. hot flashes  Orders:  -     FSH & LH    BMI 29.0-29.9,adult  Comments:  Patient is attempting to lose weight and is counseled re: decreasing carbs.    H/O benign breast biopsy  Comments:  Dr. Thomas    Patient is counseled re: BSE, diet, exercise, mammogram, calcium and Vit. D3    Will check with the patient and see if she wants me to order her yearly mammogram or is she still seeing Dr. Thomas.        Kenia Aguirre, APRN  2/20/2020

## 2020-02-21 LAB
FSH SERPL-ACNC: 5.8 MIU/ML
LH SERPL-ACNC: 3.3 MIU/ML

## 2020-02-24 ENCOUNTER — TELEPHONE (OUTPATIENT)
Dept: OBSTETRICS AND GYNECOLOGY | Facility: CLINIC | Age: 50
End: 2020-02-24

## 2020-02-24 LAB
GEN CATEG CVX/VAG CYTO-IMP: NORMAL
HPV I/H RISK 4 DNA CVX QL PROBE+SIG AMP: NOT DETECTED
LAB AP CASE REPORT: NORMAL
LAB AP GYN ADDITIONAL INFORMATION: NORMAL
LAB AP GYN OTHER FINDINGS: NORMAL
PATH INTERP SPEC-IMP: NORMAL
STAT OF ADQ CVX/VAG CYTO-IMP: NORMAL

## 2020-02-26 NOTE — TELEPHONE ENCOUNTER
Tell her that her pap is fine and she is not menopausal , her ovaries are working fine.  I suggest she take Vit. D3 and can start OTC  DHEA 25 mgm daily.

## 2020-04-15 ENCOUNTER — HOSPITAL ENCOUNTER (EMERGENCY)
Facility: HOSPITAL | Age: 50
Discharge: HOME | End: 2020-04-15
Attending: INTERNAL MEDICINE | Admitting: INTERNAL MEDICINE

## 2020-04-15 ENCOUNTER — APPOINTMENT (OUTPATIENT)
Dept: GENERAL RADIOLOGY | Facility: HOSPITAL | Age: 50
End: 2020-04-15

## 2020-04-15 ENCOUNTER — NURSE TRIAGE (OUTPATIENT)
Dept: CALL CENTER | Facility: HOSPITAL | Age: 50
End: 2020-04-15

## 2020-04-15 VITALS
BODY MASS INDEX: 29.66 KG/M2 | SYSTOLIC BLOOD PRESSURE: 110 MMHG | OXYGEN SATURATION: 100 % | HEART RATE: 80 BPM | TEMPERATURE: 98.2 F | DIASTOLIC BLOOD PRESSURE: 70 MMHG | RESPIRATION RATE: 16 BRPM | HEIGHT: 65 IN | WEIGHT: 178 LBS

## 2020-04-15 DIAGNOSIS — R07.89 MUSCULOSKELETAL CHEST PAIN: Primary | ICD-10-CM

## 2020-04-15 LAB
ALBUMIN SERPL-MCNC: 5.1 G/DL (ref 3.5–5.2)
ALBUMIN/GLOB SERPL: 1.8 G/DL
ALP SERPL-CCNC: 67 U/L (ref 39–117)
ALT SERPL W P-5'-P-CCNC: 37 U/L (ref 1–33)
ANION GAP SERPL CALCULATED.3IONS-SCNC: 15 MMOL/L (ref 5–15)
AST SERPL-CCNC: 33 U/L (ref 1–32)
BASOPHILS # BLD AUTO: 0.04 10*3/MM3 (ref 0–0.2)
BASOPHILS NFR BLD AUTO: 0.6 % (ref 0–1.5)
BILIRUB SERPL-MCNC: 0.6 MG/DL (ref 0.2–1.2)
BUN BLD-MCNC: 15 MG/DL (ref 6–20)
BUN/CREAT SERPL: 21.4 (ref 7–25)
CALCIUM SPEC-SCNC: 9.9 MG/DL (ref 8.6–10.5)
CHLORIDE SERPL-SCNC: 100 MMOL/L (ref 98–107)
CO2 SERPL-SCNC: 26 MMOL/L (ref 22–29)
CREAT BLD-MCNC: 0.7 MG/DL (ref 0.57–1)
D DIMER PPP FEU-MCNC: 0.32 MG/L (FEU) (ref 0–0.5)
DEPRECATED RDW RBC AUTO: 40.1 FL (ref 37–54)
EOSINOPHIL # BLD AUTO: 0.15 10*3/MM3 (ref 0–0.4)
EOSINOPHIL NFR BLD AUTO: 2.1 % (ref 0.3–6.2)
ERYTHROCYTE [DISTWIDTH] IN BLOOD BY AUTOMATED COUNT: 12 % (ref 12.3–15.4)
GFR SERPL CREATININE-BSD FRML MDRD: 90 ML/MIN/1.73
GLOBULIN UR ELPH-MCNC: 2.8 GM/DL
GLUCOSE BLD-MCNC: 114 MG/DL (ref 65–99)
HCT VFR BLD AUTO: 43.1 % (ref 34–46.6)
HGB BLD-MCNC: 14.9 G/DL (ref 12–15.9)
HOLD SPECIMEN: NORMAL
HOLD SPECIMEN: NORMAL
IMM GRANULOCYTES # BLD AUTO: 0.01 10*3/MM3 (ref 0–0.05)
IMM GRANULOCYTES NFR BLD AUTO: 0.1 % (ref 0–0.5)
LYMPHOCYTES # BLD AUTO: 1.86 10*3/MM3 (ref 0.7–3.1)
LYMPHOCYTES NFR BLD AUTO: 26.2 % (ref 19.6–45.3)
MCH RBC QN AUTO: 31.7 PG (ref 26.6–33)
MCHC RBC AUTO-ENTMCNC: 34.6 G/DL (ref 31.5–35.7)
MCV RBC AUTO: 91.7 FL (ref 79–97)
MONOCYTES # BLD AUTO: 0.59 10*3/MM3 (ref 0.1–0.9)
MONOCYTES NFR BLD AUTO: 8.3 % (ref 5–12)
NEUTROPHILS # BLD AUTO: 4.45 10*3/MM3 (ref 1.7–7)
NEUTROPHILS NFR BLD AUTO: 62.7 % (ref 42.7–76)
NRBC BLD AUTO-RTO: 0 /100 WBC (ref 0–0.2)
NT-PROBNP SERPL-MCNC: 6.7 PG/ML (ref 5–450)
PLATELET # BLD AUTO: 342 10*3/MM3 (ref 140–450)
PMV BLD AUTO: 9.7 FL (ref 6–12)
POTASSIUM BLD-SCNC: 3.7 MMOL/L (ref 3.5–5.2)
PROT SERPL-MCNC: 7.9 G/DL (ref 6–8.5)
RBC # BLD AUTO: 4.7 10*6/MM3 (ref 3.77–5.28)
SODIUM BLD-SCNC: 141 MMOL/L (ref 136–145)
TROPONIN T SERPL-MCNC: <0.01 NG/ML (ref 0–0.03)
TROPONIN T SERPL-MCNC: <0.01 NG/ML (ref 0–0.03)
WBC NRBC COR # BLD: 7.1 10*3/MM3 (ref 3.4–10.8)
WHOLE BLOOD HOLD SPECIMEN: NORMAL
WHOLE BLOOD HOLD SPECIMEN: NORMAL

## 2020-04-15 PROCEDURE — 85379 FIBRIN DEGRADATION QUANT: CPT | Performed by: INTERNAL MEDICINE

## 2020-04-15 PROCEDURE — 84484 ASSAY OF TROPONIN QUANT: CPT | Performed by: INTERNAL MEDICINE

## 2020-04-15 PROCEDURE — 93010 ELECTROCARDIOGRAM REPORT: CPT | Performed by: INTERNAL MEDICINE

## 2020-04-15 PROCEDURE — 71045 X-RAY EXAM CHEST 1 VIEW: CPT

## 2020-04-15 PROCEDURE — 80053 COMPREHEN METABOLIC PANEL: CPT | Performed by: INTERNAL MEDICINE

## 2020-04-15 PROCEDURE — 25010000002 KETOROLAC TROMETHAMINE PER 15 MG: Performed by: INTERNAL MEDICINE

## 2020-04-15 PROCEDURE — 93005 ELECTROCARDIOGRAM TRACING: CPT | Performed by: INTERNAL MEDICINE

## 2020-04-15 PROCEDURE — 96374 THER/PROPH/DIAG INJ IV PUSH: CPT

## 2020-04-15 PROCEDURE — 83880 ASSAY OF NATRIURETIC PEPTIDE: CPT | Performed by: INTERNAL MEDICINE

## 2020-04-15 PROCEDURE — 85025 COMPLETE CBC W/AUTO DIFF WBC: CPT | Performed by: INTERNAL MEDICINE

## 2020-04-15 PROCEDURE — 99283 EMERGENCY DEPT VISIT LOW MDM: CPT

## 2020-04-15 RX ORDER — KETOROLAC TROMETHAMINE 30 MG/ML
30 INJECTION, SOLUTION INTRAMUSCULAR; INTRAVENOUS ONCE
Status: COMPLETED | OUTPATIENT
Start: 2020-04-15 | End: 2020-04-15

## 2020-04-15 RX ADMIN — KETOROLAC TROMETHAMINE 30 MG: 30 INJECTION, SOLUTION INTRAMUSCULAR; INTRAVENOUS at 22:09

## 2020-04-16 NOTE — DISCHARGE INSTRUCTIONS
Chest Wall Pain  Chest wall pain is pain in or around the bones and muscles of your chest. Sometimes, an injury causes this pain. Excessive coughing or overuse of arm and chest muscles may also cause chest wall pain. Sometimes, the cause may not be known. This pain may take several weeks or longer to get better.  Follow these instructions at home:  Managing pain, stiffness, and swelling    · If directed, put ice on the painful area:  ? Put ice in a plastic bag.  ? Place a towel between your skin and the bag.  ? Leave the ice on for 20 minutes, 2-3 times per day.  Activity  · Rest as told by your health care provider.  · Avoid activities that cause pain. These include any activities that use your chest muscles or your abdominal and side muscles to lift heavy items. Ask your health care provider what activities are safe for you.  General instructions    · Take over-the-counter and prescription medicines only as told by your health care provider.  · Do not use any products that contain nicotine or tobacco, such as cigarettes, e-cigarettes, and chewing tobacco. These can delay healing after injury. If you need help quitting, ask your health care provider.  · Keep all follow-up visits as told by your health care provider. This is important.  Contact a health care provider if:  · You have a fever.  · Your chest pain becomes worse.  · You have new symptoms.  Get help right away if:  · You have nausea or vomiting.  · You feel sweaty or light-headed.  · You have a cough with mucus from your lungs (sputum) or you cough up blood.  · You develop shortness of breath.  These symptoms may represent a serious problem that is an emergency. Do not wait to see if the symptoms will go away. Get medical help right away. Call your local emergency services (911 in the [place] Do not drive yourself to the hospital.  Summary  · Chest wall pain is pain in or around the bones and muscles of your chest.  · Depending on the cause, it may be  treated with ice, rest, medicines, and avoiding activities that cause pain.  · Contact a health care provider if you have a fever, worsening chest pain, or new symptoms.  · Get help right away if you feel light-headed or you develop shortness of breath. These symptoms may be an emergency.  This information is not intended to replace advice given to you by your health care provider. Make sure you discuss any questions you have with your health care provider.  Document Released: 12/18/2006 Document Revised: 06/20/2019 Document Reviewed: 06/20/2019  [employer] Interactive Patient Education © 2020 [employer]

## 2020-04-16 NOTE — TELEPHONE ENCOUNTER
"Has had chest pain last night , stopped and now has been hurting over an hour, radiates shoulder and down left arm, constant not stopping, is uncomfortable,father has heart disease. [facility]     Reason for Disposition  • Chest pain lasts > 5 minutes (Exceptions: chest pain occurring > 3 days ago and now asymptomatic; same as previously diagnosed heartburn and has accompanying sour taste in mouth)    Additional Information  • Negative: Severe difficulty breathing (e.g., struggling for each breath, speaks in single words)  • Negative: Difficult to awaken or acting confused (e.g., disoriented, slurred speech)  • Negative: Shock suspected (e.g., cold/pale/clammy skin, too weak to stand, low BP, rapid pulse)  • Negative: [1] Chest pain lasts > 5 minutes AND [2] history of heart disease  (i.e., heart attack, bypass surgery, angina, angioplasty, CHF; not just a heart murmur)  • Negative: [1] Chest pain lasts > 5 minutes AND [2] described as crushing, pressure-like, or heavy  • Negative: [1] Chest pain lasts > 5 minutes AND [2] age > 50  • Negative: [1] Chest pain lasts > 5 minutes AND [2] age > 30 AND [3] at least one cardiac risk factor (i.e., hypertension, diabetes, obesity, smoker or strong family history of heart disease)  • Negative: [1] Chest pain lasts > 5 minutes AND [2] not relieved with nitroglycerin  • Negative: Passed out (i.e., lost consciousness, collapsed and was not responding)  • Negative: Heart beating < 50 beats per minute OR > 140 beats per minute  • Negative: Visible sweat on face or sweat dripping down face  • Negative: Sounds like a life-threatening emergency to the triager  • Negative: Followed a chest injury  • Negative: SEVERE chest pain  • Negative: [1] Intermittent  chest pain or \"angina\" AND [2] increasing in severity or frequency  (Exception: pains lasting a few seconds)  • Negative: Pain also present in shoulder(s) or arm(s) or jaw  (Exception: pain is clearly made worse by " "movement)  • Negative: Difficulty breathing  • Negative: Dizziness or lightheadedness  • Negative: Coughing up blood  • Negative: Cocaine use within last 3 days  • Negative: History of prior \"blood clot\" in leg or lungs (i.e., deep vein thrombosis, pulmonary embolism)  • Negative: Recent illness requiring prolonged bedrest (i.e., immobilization)  • Negative: Hip or leg fracture in past 2 months (e.g., had cast on leg or ankle)  • Negative: Major surgery in the past month  • Negative: Recent long-distance travel with prolonged time in car, bus, plane, or train (i.e., within past 2 weeks; 6 or  more hours duration)  • Negative: Taking a deep breath makes pain worse  • Negative: Patient sounds very sick or weak to the triager  • Negative: [1] Chest pain lasts > 5 minutes AND [2] occurred > 3 days ago (72 hours) AND [3] NO chest pain or cardiac symptoms now  • Negative: [1] Chest pain lasting <= 5 minutes AND [2] NO chest pain or cardiac symptoms now(Exceptions: pains lasting a few seconds)  • Negative: Fever > 100.5 F (38.1 C)    Answer Assessment - Initial Assessment Questions  1. LOCATION: \"Where does it hurt?\"        Hurts to left under arm and above to chest and up   2. RADIATION: \"Does the pain go anywhere else?\" (e.g., into neck, jaw, arms, back)      Radiates to left arm  3. ONSET: \"When did the chest pain begin?\" (Minutes, hours or days)       Last night went away back tonight  4. PATTERN \"Does the pain come and go, or has it been constant since it started?\"  \"Does it get worse with exertion?\"      went away last night fine all day today now is back and constant  5. DURATION: \"How long does it last\" (e.g., seconds, minutes, hours)     Has lasted over hour now  6. SEVERITY: \"How bad is the pain?\"  (e.g., Scale 1-10; mild, moderate, or severe)     - MILD (1-3): doesn't interfere with normal activities      - MODERATE (4-7): interferes with normal activities or awakens from sleep     - SEVERE (8-10): excruciating " "pain, unable to do any normal activities        Moderate rate 4-5  7. CARDIAC RISK FACTORS: \"Do you have any history of heart problems or risk factors for heart disease?\" (e.g., prior heart attack, angina; high blood pressure, diabetes, being overweight, high cholesterol, smoking, or strong family history of heart disease)      , high cholesterol, over weight, strong hx. Father has heart disease  8. PULMONARY RISK FACTORS: \"Do you have any history of lung disease?\"  (e.g., blood clots in lung, asthma, emphysema, birth control pills)      no  9. CAUSE: \"What do you think is causing the chest pain?\"      Unknown   10. OTHER SYMPTOMS: \"Do you have any other symptoms?\" (e.g., dizziness, nausea, vomiting, sweating, fever, difficulty breathing, cough)        cool earlier  11. PREGNANCY: \"Is there any chance you are pregnant?\" \"When was your last menstrual period?\"        no    Protocols used: CHEST PAIN-ADULT-AH      "

## 2020-04-16 NOTE — ED PROVIDER NOTES
Subjective   Patient is a 49-year-old female who yesterday the day before yesterday was having chest pain off and on she states it was in her left chest rating down her left arm is aggravated by movement of the left arm and it was intermittent course.  She states that she really did not think too much about it as she states that she has had cyst before in her left breast along her left chest line and she states that that is cause discomfort before.  But given the fact that this was recurring she became more concerned and decided to come in for further evaluation.  She denies shortness of breath, nausea, vomiting, diaphoresis, or other issue.  She does state that it bothers her because her father had a heart attack around her age.          Review of Systems   Constitutional: Negative for chills, fatigue and fever.   HENT: Negative for congestion and facial swelling.    Eyes: Negative for photophobia, discharge and visual disturbance.   Respiratory: Negative for cough, shortness of breath and wheezing.    Cardiovascular: Positive for chest pain. Negative for palpitations and leg swelling.   Gastrointestinal: Negative for abdominal pain, diarrhea, nausea and vomiting.   Endocrine: Negative for cold intolerance and heat intolerance.   Genitourinary: Negative for difficulty urinating and urgency.   Musculoskeletal: Negative for arthralgias, joint swelling and myalgias.   Skin: Negative for color change and pallor.   Neurological: Negative for dizziness and light-headedness.   Hematological: Negative for adenopathy. Does not bruise/bleed easily.   Psychiatric/Behavioral: Negative for agitation, behavioral problems and confusion.       Past Medical History:   Diagnosis Date   • Anxiety    • Hyperlipidemia    • Hypothyroid        Allergies   Allergen Reactions   • Bactrim [Sulfamethoxazole-Trimethoprim] Anaphylaxis   • Azithromycin Hives       Past Surgical History:   Procedure Laterality Date   • BREAST BIOPSY Left 2 yrs  ago   • CHOLECYSTECTOMY     • COLONOSCOPY     • ENDOMETRIAL ABLATION     • ENDOSCOPY     • TUBAL ABDOMINAL LIGATION         Family History   Problem Relation Age of Onset   • No Known Problems Mother    • No Known Problems Father    • No Known Problems Sister    • No Known Problems Brother    • No Known Problems Daughter    • No Known Problems Son    • No Known Problems Maternal Grandmother    • No Known Problems Paternal Grandmother    • No Known Problems Maternal Aunt    • No Known Problems Paternal Aunt    • BRCA 1/2 Neg Hx    • Breast cancer Neg Hx    • Colon cancer Neg Hx    • Endometrial cancer Neg Hx    • Ovarian cancer Neg Hx        Social History     Socioeconomic History   • Marital status:      Spouse name: Not on file   • Number of children: Not on file   • Years of education: Not on file   • Highest education level: Not on file   Tobacco Use   • Smoking status: Never Smoker   • Smokeless tobacco: Never Used   Substance and Sexual Activity   • Alcohol use: No   • Drug use: No   • Sexual activity: Yes     Partners: Male     Birth control/protection: Surgical           Objective   Physical Exam   Constitutional: She is oriented to person, place, and time. She appears well-developed and well-nourished.   HENT:   Head: Normocephalic and atraumatic.   Mouth/Throat: Oropharynx is clear and moist.   Eyes: Pupils are equal, round, and reactive to light. Conjunctivae and EOM are normal.   Neck: Normal range of motion. Neck supple.   Cardiovascular: Normal rate, regular rhythm, normal heart sounds and intact distal pulses.   Pulmonary/Chest: Effort normal and breath sounds normal.   Abdominal: Soft. Bowel sounds are normal. She exhibits no distension.   Musculoskeletal: Normal range of motion. She exhibits no edema.   Neurological: She is alert and oriented to person, place, and time. No cranial nerve deficit.   Skin: Skin is warm and dry.   Psychiatric: She has a normal mood and affect. Her behavior is  normal. Thought content normal.   Nursing note and vitals reviewed.      ECG 12 Lead    Date/Time: 4/15/2020 11:51 PM  Performed by: Jared Haro MD  Authorized by: Jared Haro MD   Interpreted by physician  Rhythm comments: Normal sinus rhythm with a rate of 76 without any acute ST elevation.                   ED Course  ED Course as of Apr 15 2352   Wed Apr 15, 2020   2351 I reviewed the labs imaging and EKG findings with the patient discussed with the fact that 2 troponins were unremarkable patient was agreeable for discharge home and follow-up with Dr. JARRED Huang    [RW]      ED Course User Index  [RW] Jared Haro MD                                           [facility]    Final diagnoses:   Musculoskeletal chest pain            Jared Haro MD  04/15/20 3380

## 2020-06-26 ENCOUNTER — OFFICE VISIT (OUTPATIENT)
Dept: NEUROLOGY | Age: 50
End: 2020-06-26
Payer: COMMERCIAL

## 2020-06-26 VITALS
BODY MASS INDEX: 29.99 KG/M2 | HEIGHT: 65 IN | SYSTOLIC BLOOD PRESSURE: 120 MMHG | HEART RATE: 87 BPM | WEIGHT: 180 LBS | DIASTOLIC BLOOD PRESSURE: 77 MMHG | RESPIRATION RATE: 16 BRPM

## 2020-06-26 PROBLEM — R40.0 SOMNOLENCE, DAYTIME: Status: ACTIVE | Noted: 2020-06-26

## 2020-06-26 PROCEDURE — 99214 OFFICE O/P EST MOD 30 MIN: CPT | Performed by: PHYSICIAN ASSISTANT

## 2020-06-26 RX ORDER — LEVOTHYROXINE SODIUM 0.07 MG/1
TABLET ORAL DAILY
COMMUNITY
Start: 2020-06-16

## 2020-06-26 NOTE — PROGRESS NOTES
GASTROINTESTINAL ENDOSCOPY  11/13/2018    Dr CAMILA Cedeno-w/dilation over wire-50 [place]-hiatal hernia, esophageal stricture-Gastritis       Recent Hospitalizations  ·     Significant Injuries  ·     Family History   Problem Relation Age of Onset    Hypertension Father     Colon Cancer Neg Hx     Colon Polyps Neg Hx     Esophageal Cancer Neg Hx     Liver Cancer Neg Hx     Liver Disease Neg Hx     Rectal Cancer Neg Hx     Stomach Cancer Neg Hx        Social History  Social History     Tobacco Use   Smoking Status Never Smoker   Smokeless Tobacco Never Used     Social History     Substance and Sexual Activity   Alcohol Use No     Social History     Substance and Sexual Activity   Drug Use No         Current Outpatient Medications   Medication Sig Dispense Refill    levothyroxine (SYNTHROID) 75 MCG tablet       Omega-3 Fatty Acids (FISH OIL) 1200 MG CAPS Take by mouth      desvenlafaxine succinate (PRISTIQ) 25 MG TB24 extended release tablet Take 1 tablet by mouth daily 90 tablet 2    dexlansoprazole (DEXILANT) 60 MG CPDR delayed release capsule TAKE 1 CAPSULE BY MOUTH DAILY 90 capsule 2    vitamin D (ERGOCALCIFEROL) 32667 units CAPS capsule Take one capsule once weekly for 6 weeks, then begin 5,000 units OTC. 6 capsule 0    fexofenadine (ALLEGRA) 180 MG tablet Take 180 mg by mouth daily      zinc gluconate 50 MG tablet Take 50 mg by mouth daily      desvenlafaxine succinate (PRISTIQ) 25 MG TB24 extended release tablet Take 25 mg by mouth daily      desvenlafaxine succinate (PRISTIQ) 25 MG TB24 extended release tablet TAKE 1 TABLET BY MOUTH DAILY (Patient not taking: Reported on 6/26/2020) 90 tablet 0    NONFORMULARY Indications: SH thyroid compound       CRANBERRY PO Take by mouth daily      estradiol (VIVELLE) 0.05 MG/24HR APPLY 1 PATCH EXTERNALLY TO THE SKIN 2 TIMES A WEEK (Patient not taking: Reported on 12/23/2019) 8 patch 5     No current facility-administered medications for this visit. Allergies:  Sulfamethoxazole-trimethoprim and Azithromycin    REVIEW OF SYSTEMS     Constitutional: []? Fever []? Sweats []? Chills []? Recent Injury   [x]? Denies all unless marked  HENT:[]? Headache  []? Head Injury  []? Sore Throat  []? Ear Pain  []? Dizziness []? Hearing Loss   [x]? Denies all unless marked  Spine:  []? Neck pain  []? Back pain  []? Sciaticia  [x]? Denies all unless marked  Cardiovascular:[]? Chest Pain []? Palpitations []? Heart Disease  [x]? Denies all unless marked  Pulmonary: []? Shortness of Breath []? Cough   [x]? Denies all unless marked  Gastrointestinal:  []? Abdominal Pain  []? Blood in Stool  []? Diarrhea []? Constipation []? Nausea  []? Vomiting  [x]? Denies all unless marked  Genitourinary:  []? Dysuria []? Frequency  []? Incontinence []? Urgency   [x]? Denies all unless marked  Musculoskeletal: []? Arthralgia  []? Myalgias []? Muscle cramps  []? Muscle twitches   [x]? Denies all unless marked   Extremities:   []? Pain   []? Swelling   [x]? Denies all unless marked  Skin:[]? Rash  []? Color Change  [x]? Denies all unless marked  Neurological:[]? Visual Disturbance []? Double Vision []? Slurred Speech []? Trouble swallowing  []? Vertigo []? Tingling []? Numbness []? Weakness []? Loss of Balance   []? Loss of Consciousness []? Memory Loss []? Seizures  [x]? Denies all unless marked  Psychiatric/Behavioral:[]? Depression []? Anxiety  [x]? Denies all unless marked  Sleep: []? Insomnia []? Sleep Disturbance []? Snoring []? Restless Legs [x]? Daytime Sleepiness [x]? Sleep Apnea  [x]? Denies all unless marked    The MA has completed the ROS with the patient. I have reviewed it in its' entirety with the patient and agree with the documentation.      PHYSICAL EXAM  /77   Pulse 87   Resp 16   Ht 5' 5\" (1.651 m)   Wt 180 lb (81.6 kg)   BMI 29.95 kg/m²      Constitutional - No acute distress, well developed, healthy appearing     Neck circumference:  14 inches  Mallampati:  Type 4  HEENT- and benefiting from therapy as indicated by compliance evaluation but she has had recurrent daytime somnolence. Plan:     No orders of the defined types were placed in this encounter. 1.   Patient advised of the etiology,  pathophysiology, diagnosis, treatment options, and risks of untreated ANDER. Risks may include, but are not limited to  hypertension, coronary artery disease, diabetes, stroke, weight gain, impaired cognition, daytime somnolence,  and motor vehicle accidents. Advised to abstain from driving or operating heavy machinery when drowsy and the use of respiratory suppressants. 2.  The following educational material has been included in this visit after visit summary for your review: ANDER/PAP guidelines-Discussed with the patient and all questions fully answered. 3.  Continue CPAP  4. Increase CPAP pressure to 10cm  5. Consider starting Sunosi, Provigil or Nuvigil   6. Get more sleep at q hs  7. Follow up with PCP for physical and labs  8. Follow up in 3 months        Note:  A total of >50% (>13 minutes) of 25 minutes was spent discussing the pathophysiology and treatment and/or coordination of care of the above diagnoses.

## 2020-06-26 NOTE — PATIENT INSTRUCTIONS
(continuous positive airway pressure)  device uses an air-tight attachment to the nose, typically a mask, connected to a tube and a blower which generates the pressure. Devices that fit comfortably into the nasal opening, rather than over the nose, are also available. CPAP should be used any time the person sleeps (day or night). The CPAP device is usually used for the first time in the sleep lab, where a technician can adjust the pressure and select the best equipment to keep the airway open. Alternatively, an auto device with a self-adjusting pressure feature, provided with proper education and training, can get treatment started without another sleep test. While the treatment may seem uncomfortable, noisy, or bulky at first, most people accept the treatment after experiencing better sleep. However, difficulty with mask comfort and nasal congestion prevent up to 50 percent of people from using the treatment on a regular basis. Continued follow up with a healthcare provider helps to ensure that the treatment is effective and comfortable. Information from the CPAP machine is often used by physicians, therapists, and insurers to track the success of treatment. CPAP can be delivered with different features to improve comfort and solve problems that may come up during treatment. Changes in treatment may be needed if symptoms do not improve or if the persons condition changes, such as a gain or loss of weight. Adjust sleep position -- Adjusting sleep position (to stay off the back) may help improve sleep quality in people who have ANDER when sleeping on the back. However, this is difficult to maintain throughout the night and is rarely an adequate solution. Weight loss -- Weight loss may be helpful for obese or overweight patients. Weight loss may be accomplished with dietary changes, exercise, and/or surgical treatment.  However, it can be difficult to maintain weight loss; the five-year success of non-surgical Other procedures, such as maxillomandibular advancement (MMA), address both the upper and lower pharyngeal airway more globally. UPPP alone has limited success rates (less than 50 percent) and people can relapse (when ANDER symptoms return after surgery). As a result, this surgery is only recommended in a minority of people and should be considered with caution. MMA may have a higher success rate, particularly in people with abnormal jaw (maxilla and mandible) anatomy, but it is the most complicated procedure. A newer surgical approach, nerve stimulation to protrude the tongue, has promising success rates in very selected people. Tracheostomy creates a permanent opening in the neck. It is reserved for people with severe disease in whom less drastic measures have failed or are inappropriate. Although it is always successful in eliminating obstructive sleep apnea, tracheostomy requires significant lifestyle changes and carries some serious risks (eg, infection, bleeding, blockage). All surgical treatments require discussions about the goals of treatment, the expected outcomes, and potential complications. Hypoglossal nerve stimulator- \"Inspire\" device    PAP treatment failure:  Possible causes of treatment failure include nonadherence or suboptimal adherence, weight gain, an inappropriate level of prescribed positive pressure, or an additional disorder causing sleepiness (eg, narcolepsy) that may require alterations in the therapeutic regimen. A review of medications should also be undertaken since many drugs may lead to sleepiness. Inadequate sleep time may also negate the expected effects from treatment of ANDER. Also, pt's can have persistent hypersomnolence associated with sleep apnea even in the presence of adequate therapy and at those times Provigil or Nuvigil or other stimulants may be indicated.     Once the patient's positive airway pressure therapy has been optimized and symptoms resolved, a regimen of next office visit or have your supplier fax it to our office prior to your office visit. Note:  Where applicable, we will utilize PAP device efficiency reports, additional testing, and face-to-face  clinical evaluation subsequent to any treatment, changes in treatment, and continued treatment. Caution:  Please abstain from driving or engaging in other activities which may be hazardous in the presence of diminished alertness or daytime drowsiness. And avoid the use of sedatives or alcohol, which can worsen sleep apnea and daytime drowsiness. Mask suggestions:  -     Resmed Airfit N20 (Nasal) or F20 (Full face mask). They conform to your face, thus decreasing the potential for mask leakage. You might like the [employer] (full face mask). It has a \"memory foam\" like cushion. The AirFit F30 is a smaller style full face mask designed to sit low on and cover less of your face for fewer facial marks. AirFit N30i has a top of the head tube with a nasal mask. AirFit P10 reported to be the most comfortable nasal pillow mask. Resmed Mirage FX reported to be the most comfortable nasal mask. Resmed Mirage [place] reported to be the most comfortable hybrid mask. Respironics: You might also like to try a nasal mask called a Dreamwear nasal mask or the Dreamwear nasal pillow. Another suggestion is the [facility], it is a minimal contact full face mask. The Vangie davalosdible under the nose design makes it the only full face mask that won't cause red marks on the bridge of your nose when compared to other full face masks. The Dreamwear full face mask has a  soft feel, unique in-frame air-flow, and innovative air tube connection at the top of the head for the ultimate in sleep comfort. Comfort Gel Blue. Dreamwear gel pillows. Alex & Ann: Brevida nasal pillow mask and Simplus FFM    The use of a memory foam CPAP pillow supports the head and neck throughout the night.         Troubleshooting Guide insomnia. Treatment of insomnia is discussed separately. INSOMNIA SYMPTOMS -- Common symptoms of insomnia include:  ?Difficulty falling asleep or staying asleep  ? Variable sleep, such as several nights of poor sleep followed by a night of better sleep. ?Daytime fatigue or sleepiness  ? Forgetfulness  ? Poor concentration  ? Irritability  ? Anxiety  ? Depression  ? Reduced motivation or energy  ? Increased errors or accidents  ? Ongoing worry about sleep  For many people, the symptoms of insomnia interfere with personal relationships, job performance, and daily function. People who experienced chronic insomnia may also have an increased risk of automobile accidents compared with people who are fatigued for other reasons. People with insomnia have an impaired sense of sleep. You may feel that you have not slept, even if testing shows that you have. You may also feel more fatigued than individuals without insomnia, even if testing indicates that you are less sleepy. This impaired sense of sleep may be related to a problem with the body's sleep-arousal system, which normally helps you feel awake after sleeping and feel tired before going to bed. One result of poor sleep is that you may become concerned that you will be sleep-deprived and will suffer from serious consequences of lost sleep. This concern may grow as you are unable to sleep, which in turn makes it increasingly difficult to fall asleep. It is important that you not get caught in this cycle and understand that you are sleeping more than it seems. INSOMNIA CAUSES -- Insomnia may have many causes:  Short-term insomnia -- Short-term insomnia lasts less than three months and is usually associated with stressors. Possible stressors include the following:  ?Changes in the sleeping environment (temperature, light, noise)  ? The loss of a loved one, divorce, or job loss  ? Recent illness, surgery, or sources of pain  ? Use or withdrawal from stimulants (caffeine), certain medications (theophylline, beta blockers, steroids, thyroid replacement, and asthma inhalers), illegal drugs (cocaine and methamphetamine), or alcohol  Short-term insomnia often resolves when the stressor resolves. Situations that disrupt your normal sleep cycle can also cause insomnia. Some examples of this include:  ?Jet lag - Traveling across time zones can cause insomnia, known as jet lag. Jet lag may occur regardless of the direction of travel, although it is most pronounced when traveling [facility] to [place]. Most people require several days to adjust their sleep pattern to the new time zone. ? Shift work - Individuals who work the night shift commonly experience insomnia. You may be sleepy at work and while driving home in the morning, but have difficulty staying asleep past noon. The sleep problems can be resolved by transferring from the night shift or by sleeping at the same time every day including weekends for several weeks. Shift work sleep disorder and other sleep timing disorders are summarized in the table   Long-term insomnia -- Long-term (or chronic) insomnia lasts longer than three months and occurs at least three nights per week. Insomnia often occurs with other conditions, including:  ?Mental health problems, such as depression, anxiety disorders (including panic attacks), and posttraumatic stress disorder  ? Medical illnesses, especially those that cause pain, stress, or difficulty breathing  ? Neurological disorders, such as Parkinson disease and Alzheimer disease  ? Other sleep disorders, such as sleep apnea, restless legs syndrome, periodic limb movements, and circadian rhythm disorders   ? Medications or illegal drug use  ? Irregular sleep habits  Insomnia can also occur on its own. In some cases, it can begin in childhood or be passed along in families. Short duration sleep and sleep deprivation -- Insomnia is frequently confused with short sleep requirement and sleep restriction:  ? Sleeping for only a short period of time is common among people who have insomnia. However, some people normally require little sleep and can function without difficulty after sleeping for only a few hours. People who sleep less but have no residual daytime sleepiness or other symptoms are called short sleepers and do not have a sleep problem. In addition, you may need less sleep as you get older. Needing less sleep does not necessarily mean that you have insomnia unless you also have daytime symptoms, such as sleepiness or dysphoria. ? People who have a reduced time in bed (sleep restriction) as well as those with insomnia sleep for a short time and have difficulty functioning during the daytime. However, people who are sleep restricted will fall asleep quickly and sleep normally if given the opportunity. Chronic loss of sleep, caused by spending fewer than eight hours in bed on most nights, is probably the most common cause of sleepiness. Patients with insomnia are unable to sleep normally when they are given the chance to sleep. INSOMNIA DIAGNOSIS -- If you seek help for insomnia, your doctor or nurse will start by asking you how many hours you slept and what problems you have had with sleep over a typical 24-hour period. Your bed partner or caregiver can help to answer these questions because you may not be aware of what happens while you sleep. You may be asked to keep a daily sleep log, which is a record of sleep times for one to two weeks. Your doctor or nurse may ask other questions to determine the cause of your insomnia. A physical examination may be performed to determine if there are medical or neurologic conditions causing or worsening your sleep problems. Laboratory tests may be recommended to help identify underlying medical or sleep disorders, although this is not required for everyone with insomnia.  Laboratory tests may include polysomnography or actigraphy:  ?Polysomnography - Polysomnography is a formal affect driving safety, job performance, and decision-making. Additionally, do not take benzodiazepines with alcohol or other sedating drugs, and do not take more than your doctor or nurse recommends. Benzodiazepines are generally recommended for short-term use only. Nonbenzodiazepines -- Nonbenzodiazepines are a class of prescription medicines that are somewhat similar to benzodiazepines. These medications may have fewer side effects compared with benzodiazepines because they work more on sleep centers and less on other areas of the brain. They tend to be short acting, so they are also less likely to produce hangover sedation in the morning. Some can also be prescribed for a longer period of time. Nonbenzodiazepines used to treat insomnia include zaleplon (Sonata), eszopiclone (Lunesta), zolpidem (Ambien), and zolpidem extended release (Ambien CR). Zolpidem is also available as a dissolving tablet (Edluar), an oral liquid spray (Zolpimist), and as a dissolving tablet at a lower dose for middle of the night use (Intermezzo). Do not take these medicines with alcohol or other sedating drugs, and do not take more medicine than your doctor or nurse recommends. Precautions -- Sedative-hypnotic medicines should be used with care and certain groups should not use them at all:  ? Pregnant women, due to an increased risk of birth defects  ? People with alcoholism  ? Individuals with kidney, liver, or lung problems  ? People with sleep apnea  ? Individuals who need to make decisions during the night, such as clinicians on-call or single parents caring for children  Side effects -- Sedative-hypnotic medicines can have potentially serious side effects:  ?Unusual behaviors such as driving, eating, or having sex after going to sleep have been reported after taking benzodiazepines and nonbenzodiazepines. You may have no memory of this behavior.  There is an increased risk of these unusual behaviors if you take the sleeping medicine after drinking alcohol or taking narcotic pain medicines. ? There is a risk of impaired driving the morning after taking some of these medications. This risk is higher in women and in older adults, and both of these groups typically start at the lowest available dose of the medicine. ? Severe allergic reactions (such as anaphylaxis and angioedema) have rarely been reported, sometimes after the first dose. Studies are being conducted to determine which sedative-hypnotic medicines have these risks. You should speak to your doctor or pharmacist about these risks to determine if any precautions are needed. ? There is risk of addiction with continued use of these medications. Ramelteon -- Ramelteon (Rozerem) is a prescription medicine approved for insomnia that works in a different brain system (melatonin system) from the other sedative-hypnotic medications. Its benefit is greatest for people who have difficulty falling asleep. It is unlikely to cause morning sleepiness or to be habit-forming. The most common side effects of ramelteon are headache, sleepiness, and sore throat, although these problems occur in fewer than 1 percent of patients. You should not take ramelteon if you have liver disease or take fluvoxamine (Luvox). Ramelteon is available in the [place], but not in [facility]. Suvorexant -- Suvorexant (Belsomra) is a prescription medicine that was approved by the [employer] and Drug Administration (FDA) in 2014 for insomnia. It works by blocking a brain chemical called orexin. Under normal conditions, orexin helps you to stay awake. The most common side effect of suvorexant is drowsiness the next day. People who take suvorexant should be cautious because drowsiness in the morning can affect driving safety, job performance, and decision-making. It should also be used with caution in people with obstructive sleep apnea, as it can cause problems with breathing during sleep.   Antidepressants -- Very low doses of

## 2020-06-26 NOTE — LETTER
[address] [address], [address] [place] [address]  Phone [phone]               Re:  Sukumar Michael    22  :  1970  Address: [address] [place], [place] [address]      Diagnoses:  Obstructive sleep apnea (G47.33)      To Whom It May Concern:       Current therapy:  CPAP 8 cm H2O    Change to:  CPAP 10 cm H2O        Ordering Provider:  Meredith Walker PA-C  NPI:  [id number]          Signature:       Date: 2020      Electronically Signed by Meredith Walker PA-C  on 2020 at 4:01 PM

## 2020-07-06 PROCEDURE — U0003 INFECTIOUS AGENT DETECTION BY NUCLEIC ACID (DNA OR RNA); SEVERE ACUTE RESPIRATORY SYNDROME CORONAVIRUS 2 (SARS-COV-2) (CORONAVIRUS DISEASE [COVID-19]), AMPLIFIED PROBE TECHNIQUE, MAKING USE OF HIGH THROUGHPUT TECHNOLOGIES AS DESCRIBED BY CMS-2020-01-R: HCPCS | Performed by: NURSE PRACTITIONER

## 2020-08-16 ENCOUNTER — NURSE TRIAGE (OUTPATIENT)
Dept: CALL CENTER | Facility: HOSPITAL | Age: 50
End: 2020-08-16

## 2020-08-16 NOTE — TELEPHONE ENCOUNTER
"  Pt started with congestion last night....woke up with sore throat and cough afebrile. Will  Go get covid testedtoo.  Reason for Disposition  • [1] SEVERE sore throat AND [2] present > 24 hours    Additional Information  • Negative: Severe difficulty breathing (e.g., struggling for each breath, speaks in single words)  • Negative: Sounds like a life-threatening emergency to the triager  • Negative: Runny nose is caused by pollen or other allergies  • Negative: Cough is main symptom  • Negative: Severe sore throat  • Negative: Fever > 104 F (40 C)  • Negative: [1] Difficulty breathing AND [2] not severe AND [3] not from stuffy nose (e.g., not relieved by cleaning out the nose)  • Negative: Patient sounds very sick or weak to the triager  • Negative: [1] Fever > 101 F (38.3 C) AND [2] age > 60  • Negative: [1] Fever > 100.0 F (37.8 C) AND [2] bedridden (e.g., nursing home patient, CVA, chronic illness, recovering from surgery)  • Negative: [1] Fever > 100.0 F (37.8 C) AND [2] diabetes mellitus or weak immune system (e.g., HIV positive, cancer chemo, splenectomy, organ transplant, chronic steroids)  • Negative: Fever present > 3 days (72 hours)  • Negative: [1] Fever returns after gone for over 24 hours AND [2] symptoms worse or not improved  • Negative: [1] Sinus pain (not just congestion) AND [2] fever  • Negative: Earache    Answer Assessment - Initial Assessment Questions  1. ONSET: \"When did the nasal discharge start?\"       yesterday  2. AMOUNT: \"How much discharge is there?\"        mod  3. COUGH: \"Do you have a cough?\" If yes, ask: \"Describe the color of your sputum\" (clear, white, yellow, green)     Today,non productive  4. RESPIRATORY DISTRESS: \"Describe your breathing.\"      No wheezing  5. FEVER: \"Do you have a fever?\" If so, ask: \"What is your temperature, how was it measured, and when did it start?\"     no  6. SEVERITY: \"Overall, how bad are you feeling right now?\" (e.g., doesn't interfere with normal " "activities, staying home from school/work, staying in bed)        Stayed in bed today  7. OTHER SYMPTOMS: \"Do you have any other symptoms?\" (e.g., sore throat, earache, wheezing, vomiting)       Sore throat  8. PREGNANCY: \"Is there any chance you are pregnant?\" \"When was your last menstrual period?\"       no    Protocols used: COMMON COLD-ADULT-AH      "

## 2020-09-08 ENCOUNTER — HOSPITAL ENCOUNTER (OUTPATIENT)
Dept: MAMMOGRAPHY | Facility: HOSPITAL | Age: 50
Discharge: HOME | End: 2020-09-08
Admitting: SPECIALIST

## 2020-09-08 DIAGNOSIS — Z12.31 ENCOUNTER FOR SCREENING MAMMOGRAM FOR MALIGNANT NEOPLASM OF BREAST: ICD-10-CM

## 2020-09-08 PROCEDURE — 77067 SCR MAMMO BI INCL CAD: CPT

## 2020-09-08 PROCEDURE — 77063 BREAST TOMOSYNTHESIS BI: CPT

## 2020-09-10 ENCOUNTER — TRANSCRIBE ORDERS (OUTPATIENT)
Dept: ADMINISTRATIVE | Facility: HOSPITAL | Age: 50
End: 2020-09-10

## 2020-09-10 DIAGNOSIS — Z12.31 OTHER SCREENING MAMMOGRAM: Primary | ICD-10-CM

## 2021-01-27 ENCOUNTER — TRANSCRIBE ORDERS (OUTPATIENT)
Dept: ADMINISTRATIVE | Facility: HOSPITAL | Age: 51
End: 2021-01-27

## 2021-01-27 ENCOUNTER — LAB (OUTPATIENT)
Dept: LAB | Facility: HOSPITAL | Age: 51
End: 2021-01-27

## 2021-01-27 DIAGNOSIS — I10 ESSENTIAL HYPERTENSION, MALIGNANT: ICD-10-CM

## 2021-01-27 DIAGNOSIS — Z01.812 PRE-OPERATIVE LABORATORY EXAMINATION: ICD-10-CM

## 2021-01-27 DIAGNOSIS — E55.9 AVITAMINOSIS D: ICD-10-CM

## 2021-01-27 DIAGNOSIS — Z01.818 PREOPERATIVE EXAMINATION: Primary | ICD-10-CM

## 2021-01-27 DIAGNOSIS — Z01.818 PREOPERATIVE EXAMINATION: ICD-10-CM

## 2021-01-27 LAB
25(OH)D3 SERPL-MCNC: 67.1 NG/ML (ref 30–100)
ALBUMIN SERPL-MCNC: 4.4 G/DL (ref 3.5–5.2)
ALBUMIN/GLOB SERPL: 2.1 G/DL
ALP SERPL-CCNC: 59 U/L (ref 39–117)
ALT SERPL W P-5'-P-CCNC: 13 U/L (ref 1–33)
ANION GAP SERPL CALCULATED.3IONS-SCNC: 10.1 MMOL/L (ref 5–15)
AST SERPL-CCNC: 14 U/L (ref 1–32)
BACTERIA UR QL AUTO: ABNORMAL /HPF
BILIRUB SERPL-MCNC: 0.4 MG/DL (ref 0–1.2)
BILIRUB UR QL STRIP: NEGATIVE
BUN SERPL-MCNC: 15 MG/DL (ref 6–20)
BUN/CREAT SERPL: 22.1 (ref 7–25)
CALCIUM SPEC-SCNC: 8.7 MG/DL (ref 8.6–10.5)
CHLORIDE SERPL-SCNC: 105 MMOL/L (ref 98–107)
CLARITY UR: ABNORMAL
CO2 SERPL-SCNC: 24.9 MMOL/L (ref 22–29)
COLOR UR: YELLOW
CREAT SERPL-MCNC: 0.68 MG/DL (ref 0.57–1)
GFR SERPL CREATININE-BSD FRML MDRD: 92 ML/MIN/1.73
GLOBULIN UR ELPH-MCNC: 2.1 GM/DL
GLUCOSE SERPL-MCNC: 95 MG/DL (ref 65–99)
GLUCOSE UR STRIP-MCNC: NEGATIVE MG/DL
HGB UR QL STRIP.AUTO: ABNORMAL
HYALINE CASTS UR QL AUTO: ABNORMAL /LPF
KETONES UR QL STRIP: NEGATIVE
LEUKOCYTE ESTERASE UR QL STRIP.AUTO: ABNORMAL
NITRITE UR QL STRIP: NEGATIVE
PH UR STRIP.AUTO: 5.5 [PH] (ref 5–8)
POTASSIUM SERPL-SCNC: 4 MMOL/L (ref 3.5–5.2)
PROT SERPL-MCNC: 6.5 G/DL (ref 6–8.5)
PROT UR QL STRIP: ABNORMAL
RBC # UR: ABNORMAL /HPF
REF LAB TEST METHOD: ABNORMAL
SODIUM SERPL-SCNC: 140 MMOL/L (ref 136–145)
SP GR UR STRIP: 1.03 (ref 1–1.03)
SQUAMOUS #/AREA URNS HPF: ABNORMAL /HPF
UROBILINOGEN UR QL STRIP: ABNORMAL
WBC UR QL AUTO: ABNORMAL /HPF

## 2021-01-27 PROCEDURE — 36415 COLL VENOUS BLD VENIPUNCTURE: CPT

## 2021-01-27 PROCEDURE — 81001 URINALYSIS AUTO W/SCOPE: CPT

## 2021-01-27 PROCEDURE — 80053 COMPREHEN METABOLIC PANEL: CPT

## 2021-01-27 PROCEDURE — 82306 VITAMIN D 25 HYDROXY: CPT

## 2021-01-27 PROCEDURE — 87086 URINE CULTURE/COLONY COUNT: CPT

## 2021-01-28 LAB — BACTERIA SPEC AEROBE CULT: NO GROWTH

## 2021-03-15 ENCOUNTER — IMMUNIZATION (OUTPATIENT)
Dept: VACCINE CLINIC | Facility: HOSPITAL | Age: 51
End: 2021-03-15

## 2021-03-15 PROCEDURE — 0011A: CPT | Performed by: OBSTETRICS & GYNECOLOGY

## 2021-03-15 PROCEDURE — 91301 HC SARSCO02 VAC 100MCG/0.5ML IM: CPT | Performed by: OBSTETRICS & GYNECOLOGY

## 2021-03-27 PROCEDURE — 87635 SARS-COV-2 COVID-19 AMP PRB: CPT | Performed by: FAMILY MEDICINE

## 2021-04-12 ENCOUNTER — APPOINTMENT (OUTPATIENT)
Dept: VACCINE CLINIC | Facility: HOSPITAL | Age: 51
End: 2021-04-12

## 2021-05-05 ENCOUNTER — IMMUNIZATION (OUTPATIENT)
Dept: VACCINE CLINIC | Facility: HOSPITAL | Age: 51
End: 2021-05-05

## 2021-05-05 PROCEDURE — 91301 HC SARSCO02 VAC 100MCG/0.5ML IM: CPT | Performed by: OBSTETRICS & GYNECOLOGY

## 2021-05-05 PROCEDURE — 0012A: CPT | Performed by: OBSTETRICS & GYNECOLOGY

## 2021-07-13 ENCOUNTER — TRANSCRIBE ORDERS (OUTPATIENT)
Dept: ADMINISTRATIVE | Facility: HOSPITAL | Age: 51
End: 2021-07-13

## 2021-07-13 DIAGNOSIS — N63.25 BREAST LUMP ON LEFT SIDE AT 3 O'CLOCK POSITION: Primary | ICD-10-CM

## 2021-07-13 DIAGNOSIS — N64.4 BREAST PAIN: ICD-10-CM

## 2021-07-16 ENCOUNTER — HOSPITAL ENCOUNTER (OUTPATIENT)
Dept: ULTRASOUND IMAGING | Facility: HOSPITAL | Age: 51
End: 2021-07-16

## 2021-07-19 ENCOUNTER — HOSPITAL ENCOUNTER (OUTPATIENT)
Dept: MAMMOGRAPHY | Facility: HOSPITAL | Age: 51
Discharge: HOME | End: 2021-07-19

## 2021-07-19 ENCOUNTER — HOSPITAL ENCOUNTER (OUTPATIENT)
Dept: ULTRASOUND IMAGING | Facility: HOSPITAL | Age: 51
Discharge: HOME | End: 2021-07-19

## 2021-07-19 DIAGNOSIS — N63.25 BREAST LUMP ON LEFT SIDE AT 3 O'CLOCK POSITION: ICD-10-CM

## 2021-07-19 DIAGNOSIS — N64.4 BREAST PAIN: ICD-10-CM

## 2021-07-19 PROCEDURE — 76642 ULTRASOUND BREAST LIMITED: CPT

## 2021-07-19 PROCEDURE — G0279 TOMOSYNTHESIS, MAMMO: HCPCS

## 2021-07-19 PROCEDURE — 77066 DX MAMMO INCL CAD BI: CPT

## 2021-07-20 PROCEDURE — 88112 CYTOPATH CELL ENHANCE TECH: CPT | Performed by: SPECIALIST

## 2021-07-21 ENCOUNTER — LAB REQUISITION (OUTPATIENT)
Dept: LAB | Facility: HOSPITAL | Age: 51
End: 2021-07-21

## 2021-07-21 DIAGNOSIS — N60.02 SOLITARY CYST OF LEFT BREAST: ICD-10-CM

## 2021-07-22 LAB
CYTO UR: NORMAL
LAB AP CASE REPORT: NORMAL
PATH REPORT.FINAL DX SPEC: NORMAL
PATH REPORT.GROSS SPEC: NORMAL

## 2021-09-09 ENCOUNTER — APPOINTMENT (OUTPATIENT)
Dept: MAMMOGRAPHY | Facility: HOSPITAL | Age: 51
End: 2021-09-09

## 2021-09-30 ENCOUNTER — OFFICE VISIT (OUTPATIENT)
Dept: GASTROENTEROLOGY | Age: 51
End: 2021-09-30
Payer: COMMERCIAL

## 2021-09-30 VITALS
WEIGHT: 190.2 LBS | HEART RATE: 82 BPM | BODY MASS INDEX: 31.69 KG/M2 | HEIGHT: 65 IN | SYSTOLIC BLOOD PRESSURE: 110 MMHG | OXYGEN SATURATION: 96 % | DIASTOLIC BLOOD PRESSURE: 66 MMHG

## 2021-09-30 DIAGNOSIS — R13.10 DYSPHAGIA, UNSPECIFIED TYPE: Primary | ICD-10-CM

## 2021-09-30 PROCEDURE — 99213 OFFICE O/P EST LOW 20 MIN: CPT | Performed by: NURSE PRACTITIONER

## 2021-09-30 RX ORDER — TRANDOLAPRIL AND VERAPAMIL HYDROCHLORIDE 2; 180 MG/1; MG/1
1 TABLET, FILM COATED, EXTENDED RELEASE ORAL DAILY
COMMUNITY

## 2021-09-30 RX ORDER — BUSPIRONE HYDROCHLORIDE 15 MG/1
15 TABLET ORAL DAILY
COMMUNITY

## 2021-09-30 ASSESSMENT — ENCOUNTER SYMPTOMS
ABDOMINAL DISTENTION: 0
DIARRHEA: 0
VOICE CHANGE: 0
TROUBLE SWALLOWING: 1
RECTAL PAIN: 0
ABDOMINAL PAIN: 0
BACK PAIN: 0
BLOOD IN STOOL: 0
NAUSEA: 0
CONSTIPATION: 0
VOMITING: 0
COUGH: 1
SHORTNESS OF BREATH: 0
ANAL BLEEDING: 0
SORE THROAT: 0

## 2021-09-30 NOTE — PROGRESS NOTES
Subjective:      Quan Granados is a53 y.o. female  Chief Complaint   Patient presents with    Dysphagia       HPI  PCP: Michela Stewart MD  Pt made an appt. Reports dysphagia   When eating chicken  This started about 6 months ago  Was short lived. The last episode of this was 2-3 months ago. Has had no further episodes of dysphagia since this time. Denies uncontrolled heartburn, EUGENIA pain. We discussed an EGD, she defers at this time since she seems to be better but if it starts back over the next few weeks/months she'll call to get EGD scheduled. Reports she has noticed she starts coughing and has to drink water to stop the coughing  And sometimes vomits when she cant stop coughing. Started a few months ago. But has actually improved on its own over the last week. She is making an appt to see her PCP for this    No lower GI complaints. GI scope reports in history per MA per OV policy, I reviewed this    Family HX:    Pt denies family hx of colon polyps, colon CA, inflammatory bowel dx, gastric CA and esophageal CA. Past Medical History:   Diagnosis Date    Anxiety     CPAP (continuous positive airway pressure) dependence     8cm    GERD (gastroesophageal reflux disease)     Hypothyroidism     Obstructive sleep apnea     AHI: 15.5 per PSG, 8/2019    Prolonged emergence from general anesthesia           Past Surgical History:   Procedure Laterality Date    BREAST SURGERY Left     cyst removed    CHOLECYSTECTOMY  2016     DR. Forrest Moreno COLONOSCOPY N/A 03/26/2019    Dr Everett [facility]    EGD  1990    Dr. Jacoby Farrell EGD TRANSORAL BIOPSY SINGLE/MULTIPLE N/A 11/13/2018    SKIN BIOPSY Right     leg- birth jameel removed    UPPER GASTROINTESTINAL ENDOSCOPY  11/13/2018    Dr CAMILA Cedeno-w/dilation over wire-48 Amharic-hiatal hernia, esophageal stricture-Gastritis       Social History     Socioeconomic History    Marital status:  Spouse name: None    Number of children: None    Years of education: None    Highest education level: None   Occupational History    None   Tobacco Use    Smoking status: Never Smoker    Smokeless tobacco: Never Used   Vaping Use    Vaping Use: Never used   Substance and Sexual Activity    Alcohol use: No    Drug use: No    Sexual activity: None   Other Topics Concern    None   Social History Narrative    None     Social Determinants of Health     Financial Resource Strain:     Difficulty of Paying Living Expenses:    Food Insecurity:     Worried About Running Out of Food in the Last Year:     Ran Out of Food in the Last Year:    Transportation Needs:     Lack of Transportation (Medical):  Lack of Transportation (Non-Medical):    Physical Activity:     Days of Exercise per Week:     Minutes of Exercise per Session:    Stress:     Feeling of Stress :    Social Connections:     Frequency of Communication with Friends and Family:     Frequency of Social Gatherings with Friends and Family:     Attends Hinduism Services:     Active Member of Clubs or Organizations:     Attends Club or Organization Meetings:     Marital Status:    Intimate Partner Violence:     Fear of Current or Ex-Partner:     Emotionally Abused:     Physically Abused:     Sexually Abused:         Allergies   Allergen Reactions    Sulfamethoxazole-Trimethoprim Anaphylaxis    Azithromycin Hives       Current Outpatient Medications   Medication Sig Dispense Refill    Cyanocobalamin (VITAMIN B-12 IJ) Inject as directed once a week      trandolapril-verapamil (TARKA) 2-180 MG per extended release tablet Take 1 tablet by mouth daily      busPIRone (BUSPAR) 15 MG tablet Take 15 mg by mouth daily      Nutritional Supplements (DHEA PO) Take by mouth daily      levothyroxine (SYNTHROID) 75 MCG tablet Daily       Omega-3 Fatty Acids (FISH OIL) 1200 MG CAPS Take by mouth daily       desvenlafaxine succinate (PRISTIQ) 25 MG TB24 extended release tablet Take 25 mg by mouth daily      dexlansoprazole (DEXILANT) 60 MG CPDR delayed release capsule TAKE 1 CAPSULE BY MOUTH DAILY 90 capsule 2    estradiol (VIVELLE) 0.05 MG/24HR APPLY 1 PATCH EXTERNALLY TO THE SKIN 2 TIMES A WEEK 8 patch 5    fexofenadine (ALLEGRA) 180 MG tablet Take 180 mg by mouth daily       No current facility-administered medications for this visit. Review of Systems   Constitutional: Positive for fatigue. Negative for appetite change, fever and unexpected weight change. HENT: Positive for trouble swallowing (resolved). Negative for sore throat and voice change. Respiratory: Positive for cough. Negative for shortness of breath. Cardiovascular: Negative for chest pain, palpitations and leg swelling. Gastrointestinal: Negative for abdominal distention, abdominal pain, anal bleeding, blood in stool, constipation, diarrhea, nausea, rectal pain and vomiting. Genitourinary: Negative for hematuria. Musculoskeletal: Negative for arthralgias, back pain and neck pain. Neurological: Negative for dizziness, weakness, light-headedness and headaches. Psychiatric/Behavioral: Negative for dysphoric mood and sleep disturbance. The patient is not nervous/anxious. All other systems reviewed and are negative. Objective:     Physical Exam  Vitals and nursing note reviewed. Constitutional:       Appearance: She is well-developed. Comments: /66   Pulse 82   Ht 5' 5\" (1.651 m)   Wt 190 lb 3.2 oz (86.3 kg)   SpO2 96%   BMI 31.65 kg/m²    Eyes:      General: No scleral icterus. Conjunctiva/sclera: Conjunctivae normal.      Pupils: Pupils are equal, round, and reactive to light. Cardiovascular:      Rate and Rhythm: Normal rate and regular rhythm. Heart sounds: Normal heart sounds. No murmur heard. No friction rub. No gallop. Pulmonary:      Effort: Pulmonary effort is normal. No respiratory distress.       Breath sounds: Normal breath sounds. Abdominal:      General: Bowel sounds are normal. There is no distension. Palpations: Abdomen is soft. Tenderness: There is no abdominal tenderness. There is no rebound. Neurological:      Mental Status: She is alert and oriented to person, place, and time. Cranial Nerves: No cranial nerve deficit. Psychiatric:         Judgment: Judgment normal.           Assessment:       Diagnosis Orders   1. Dysphagia, unspecified type           Plan:      1. F/u prn.  She knows to call here if the dysphagia returns and we can schedule an EGD for her

## 2021-11-27 ENCOUNTER — APPOINTMENT (OUTPATIENT)
Dept: GENERAL RADIOLOGY | Facility: HOSPITAL | Age: 51
End: 2021-11-27

## 2021-11-27 ENCOUNTER — HOSPITAL ENCOUNTER (EMERGENCY)
Facility: HOSPITAL | Age: 51
Discharge: HOME | End: 2021-11-27
Attending: EMERGENCY MEDICINE | Admitting: EMERGENCY MEDICINE

## 2021-11-27 VITALS
HEART RATE: 75 BPM | RESPIRATION RATE: 18 BRPM | OXYGEN SATURATION: 95 % | TEMPERATURE: 98.6 F | WEIGHT: 190 LBS | DIASTOLIC BLOOD PRESSURE: 57 MMHG | BODY MASS INDEX: 31.65 KG/M2 | SYSTOLIC BLOOD PRESSURE: 116 MMHG | HEIGHT: 65 IN

## 2021-11-27 DIAGNOSIS — J40 BRONCHITIS: Primary | ICD-10-CM

## 2021-11-27 LAB
ANION GAP SERPL CALCULATED.3IONS-SCNC: 12 MMOL/L (ref 5–15)
BASOPHILS # BLD AUTO: 0.04 10*3/MM3 (ref 0–0.2)
BASOPHILS NFR BLD AUTO: 0.5 % (ref 0–1.5)
BUN SERPL-MCNC: 13 MG/DL (ref 6–20)
BUN/CREAT SERPL: 21.3 (ref 7–25)
CALCIUM SPEC-SCNC: 9 MG/DL (ref 8.6–10.5)
CHLORIDE SERPL-SCNC: 102 MMOL/L (ref 98–107)
CO2 SERPL-SCNC: 27 MMOL/L (ref 22–29)
CREAT SERPL-MCNC: 0.61 MG/DL (ref 0.57–1)
DEPRECATED RDW RBC AUTO: 41.1 FL (ref 37–54)
EOSINOPHIL # BLD AUTO: 0.26 10*3/MM3 (ref 0–0.4)
EOSINOPHIL NFR BLD AUTO: 3.2 % (ref 0.3–6.2)
ERYTHROCYTE [DISTWIDTH] IN BLOOD BY AUTOMATED COUNT: 12 % (ref 12.3–15.4)
GFR SERPL CREATININE-BSD FRML MDRD: 104 ML/MIN/1.73
GLUCOSE SERPL-MCNC: 122 MG/DL (ref 65–99)
HCT VFR BLD AUTO: 39.8 % (ref 34–46.6)
HGB BLD-MCNC: 13.3 G/DL (ref 12–15.9)
IMM GRANULOCYTES # BLD AUTO: 0.04 10*3/MM3 (ref 0–0.05)
IMM GRANULOCYTES NFR BLD AUTO: 0.5 % (ref 0–0.5)
LYMPHOCYTES # BLD AUTO: 1.98 10*3/MM3 (ref 0.7–3.1)
LYMPHOCYTES NFR BLD AUTO: 24.5 % (ref 19.6–45.3)
MCH RBC QN AUTO: 31.1 PG (ref 26.6–33)
MCHC RBC AUTO-ENTMCNC: 33.4 G/DL (ref 31.5–35.7)
MCV RBC AUTO: 93.2 FL (ref 79–97)
MONOCYTES # BLD AUTO: 0.76 10*3/MM3 (ref 0.1–0.9)
MONOCYTES NFR BLD AUTO: 9.4 % (ref 5–12)
NEUTROPHILS NFR BLD AUTO: 4.99 10*3/MM3 (ref 1.7–7)
NEUTROPHILS NFR BLD AUTO: 61.9 % (ref 42.7–76)
NRBC BLD AUTO-RTO: 0 /100 WBC (ref 0–0.2)
NT-PROBNP SERPL-MCNC: 46.1 PG/ML (ref 0–450)
PLATELET # BLD AUTO: 326 10*3/MM3 (ref 140–450)
PMV BLD AUTO: 9.8 FL (ref 6–12)
POTASSIUM SERPL-SCNC: 3.7 MMOL/L (ref 3.5–5.2)
RBC # BLD AUTO: 4.27 10*6/MM3 (ref 3.77–5.28)
SODIUM SERPL-SCNC: 141 MMOL/L (ref 136–145)
WBC NRBC COR # BLD: 8.07 10*3/MM3 (ref 3.4–10.8)

## 2021-11-27 PROCEDURE — 25010000002 METHYLPREDNISOLONE PER 125 MG: Performed by: EMERGENCY MEDICINE

## 2021-11-27 PROCEDURE — 85025 COMPLETE CBC W/AUTO DIFF WBC: CPT | Performed by: EMERGENCY MEDICINE

## 2021-11-27 PROCEDURE — 96374 THER/PROPH/DIAG INJ IV PUSH: CPT

## 2021-11-27 PROCEDURE — 80048 BASIC METABOLIC PNL TOTAL CA: CPT | Performed by: EMERGENCY MEDICINE

## 2021-11-27 PROCEDURE — 83880 ASSAY OF NATRIURETIC PEPTIDE: CPT | Performed by: EMERGENCY MEDICINE

## 2021-11-27 PROCEDURE — 94640 AIRWAY INHALATION TREATMENT: CPT

## 2021-11-27 PROCEDURE — 94799 UNLISTED PULMONARY SVC/PX: CPT

## 2021-11-27 PROCEDURE — 71046 X-RAY EXAM CHEST 2 VIEWS: CPT

## 2021-11-27 PROCEDURE — 99283 EMERGENCY DEPT VISIT LOW MDM: CPT

## 2021-11-27 RX ORDER — PREDNISONE 20 MG/1
TABLET ORAL
Qty: 20 TABLET | Refills: 0 | Status: SHIPPED | OUTPATIENT
Start: 2021-11-27 | End: 2021-12-09

## 2021-11-27 RX ORDER — FEXOFENADINE HCL 180 MG/1
180 TABLET ORAL DAILY
Qty: 30 TABLET | Refills: 0 | Status: SHIPPED | OUTPATIENT
Start: 2021-11-27

## 2021-11-27 RX ORDER — IPRATROPIUM BROMIDE AND ALBUTEROL SULFATE 2.5; .5 MG/3ML; MG/3ML
3 SOLUTION RESPIRATORY (INHALATION) ONCE
Status: COMPLETED | OUTPATIENT
Start: 2021-11-27 | End: 2021-11-27

## 2021-11-27 RX ORDER — METHYLPREDNISOLONE SODIUM SUCCINATE 125 MG/2ML
125 INJECTION, POWDER, LYOPHILIZED, FOR SOLUTION INTRAMUSCULAR; INTRAVENOUS ONCE
Status: COMPLETED | OUTPATIENT
Start: 2021-11-27 | End: 2021-11-27

## 2021-11-27 RX ORDER — PSEUDOEPHEDRINE HCL 120 MG/1
120 TABLET, FILM COATED, EXTENDED RELEASE ORAL EVERY 12 HOURS
Qty: 14 TABLET | Refills: 0 | Status: SHIPPED | OUTPATIENT
Start: 2021-11-27 | End: 2021-12-27

## 2021-11-27 RX ORDER — PREDNISONE 20 MG/1
TABLET ORAL
Qty: 20 TABLET | Refills: 0 | Status: SHIPPED | OUTPATIENT
Start: 2021-11-27 | End: 2021-11-27 | Stop reason: SDUPTHER

## 2021-11-27 RX ORDER — BENZONATATE 200 MG/1
200 CAPSULE ORAL 3 TIMES DAILY PRN
Qty: 15 CAPSULE | Refills: 0 | Status: SHIPPED | OUTPATIENT
Start: 2021-11-27 | End: 2022-09-01

## 2021-11-27 RX ADMIN — METHYLPREDNISOLONE SODIUM SUCCINATE 125 MG: 125 INJECTION, POWDER, FOR SOLUTION INTRAMUSCULAR; INTRAVENOUS at 21:17

## 2021-11-27 RX ADMIN — IPRATROPIUM BROMIDE AND ALBUTEROL SULFATE 3 ML: 2.5; .5 SOLUTION RESPIRATORY (INHALATION) at 22:02

## 2021-12-26 NOTE — PROGRESS NOTES
VINAY Yanez  [facility]   Respiratory Disease Clinic  [address] [place] [address]  Phone: [phone]  Fax: [phone]       Chief Complaint  Cough, Shortness of Breath, and Sleep Apnea    Subjective    History of Present Illness    Bre Birch presents to [facility] PULMONARY [facility] CR[facility]ARE MEDICINE   History of Present Illness   The patient presents today for further evaluation of chronic cough and shortness of breath.   • Shortness of breath, not at this time  • Cough, improving.  The patient reports that the cough is improved.  She states that she was evaluated in the emergency department on November 27, 2021 for treatment of bronchitis.  She reports completing at least 2 antibiotics.  She has also been treated with Allegra, Sudafed, Tessalon Perles, and a prednisone taper.  She remains on Allegra.  She has not tried any inhalers.  The patient states that she has episodes where she gets choked leading to a coughing spell.  She states that she cannot relate this to eating specifically.  It occurs at random times.  • Do you have a history of lung problems, no.   • Family history of lung problems, no.   • Heartburn, yes.  She is currently on dexilant.     • Trouble swallowing, no.  • Do you have a history of connective tissue disease, no.  Daughter with lupus.  She has never been tested herself for autoimmune disease.   • Rash, no. Dry mouth, no.  Dry eyes, no.  Joint pain, no.    • Allergies, yes.  Allegra    • Do you have pets, yes, 1 dog.    • The patient  reports that she has never smoked. She has never used smokeless tobacco.  • Do you drink alcohol? Occasional   • Do you use any illegal drugs or prescription drugs that are not prescribed to you? No  • Work history: [employer]   • Have you ever taken Methotrexate? No   • Have you ever taken Amiodarone?  No   • Have you ever taken Macrodantin for an extended period of time? No  • Obstructive sleep  "apnea: Yes, she will start back using her machine.     Objective   Vital Signs:   /80   Pulse (!) 122   Ht 165.1 cm (65\")   Wt 83.6 kg (184 lb 6.4 oz)   SpO2 98% Comment: ra  BMI 30.69 kg/m²     Physical Exam  Vitals reviewed.   Constitutional:       General: She is not in acute distress.     Appearance: She is well-developed.      Interventions: Face mask in place.   HENT:      Head: Normocephalic and atraumatic.      Right Ear: A middle ear effusion is present.      Left Ear: A middle ear effusion is present.   Eyes:      General: No scleral icterus.     Conjunctiva/sclera: Conjunctivae normal.      Pupils: Pupils are equal, round, and reactive to light.   Cardiovascular:      Rate and Rhythm: Normal rate.   Pulmonary:      Effort: Pulmonary effort is normal. No respiratory distress.      Breath sounds: Normal breath sounds. No wheezing or rales.   Musculoskeletal:         General: Normal range of motion.      Cervical back: Normal range of motion and neck supple.   Skin:     General: Skin is warm and dry.   Neurological:      Mental Status: She is alert and oriented to person, place, and time.   Psychiatric:         Behavior: Behavior normal.         Thought Content: Thought content normal.         Judgment: Judgment normal.        Result Review :  The following data was reviewed by: VINAY Yanez on 12/27/2021:  XR Chest 2 View (11/27/2021 21:10)        No results found for this or any previous visit.           Assessment and Plan  Diagnoses and all orders for this visit:    1. Cough (Primary)  -     albuterol sulfate  (90 Base) MCG/ACT inhaler; Inhale 2 puffs Every 4 (Four) Hours As Needed for Wheezing or Shortness of Air for up to 30 days.  Dispense: 18 g; Refill: 6  -     CT Chest With Contrast; Future  -     POC Creatinine; Future  Discussed with the patient common causes of cough including GERD and allergic rhinitis/postnasal drip.  The patient is currently on Dexilant.  She " does report episodes where she randomly gets choked and has coughing fits associated with this.  It appears that she has been evaluated by GI in the past, but dysphagia had improved and EGD was not completed as her symptoms had resolved.  Patient is also noted be on ACE-I.  This could be associated with cough as well.  Will obtain CT of the chest with contrast and pulmonary function test for further evaluation of cough.  Trial albuterol HFA inhaler.  Continue Allegra.  Trial Flonase from over-the-counter.    If symptoms do not continue to improve then the patient may need referral to ENT or possibly GI evaluation for EGD.  2. Allergic rhinitis, unspecified seasonality, unspecified trigger  Comments:  Continue Allegra.  Trial Flonase over-the-counter.    3. Obstructive sleep apnea  Comments:  Resume noninvasive positive pressure ventilation device.    4. Gastroesophageal reflux disease, unspecified whether esophagitis present  Comments:  Continue Dexilant    5. Obesity (BMI 30-39.9)  Comments:  Recommend weight loss      Health maintenance:   Influenza vaccine: yes  Pneumovax 23: no   Prevnar 13: no   Covid 19: yes  Follow Up  Le Hendricks, VINAY  12/27/2021  18:59 CST  Return in about 4 weeks (around 1/24/2022) for CPFT, CT.  Patient was given instructions and counseling regarding her condition or for health maintenance advice. Please see specific information pulled into the AVS if appropriate.   Please note that portions of this note were completed with a voice recognition program.

## 2021-12-27 ENCOUNTER — OFFICE VISIT (OUTPATIENT)
Dept: PULMONOLOGY | Facility: CLINIC | Age: 51
End: 2021-12-27

## 2021-12-27 VITALS
OXYGEN SATURATION: 98 % | SYSTOLIC BLOOD PRESSURE: 122 MMHG | HEART RATE: 122 BPM | BODY MASS INDEX: 30.72 KG/M2 | HEIGHT: 65 IN | DIASTOLIC BLOOD PRESSURE: 80 MMHG | WEIGHT: 184.4 LBS

## 2021-12-27 DIAGNOSIS — E66.9 OBESITY (BMI 30-39.9): ICD-10-CM

## 2021-12-27 DIAGNOSIS — K21.9 GASTROESOPHAGEAL REFLUX DISEASE, UNSPECIFIED WHETHER ESOPHAGITIS PRESENT: ICD-10-CM

## 2021-12-27 DIAGNOSIS — J30.9 ALLERGIC RHINITIS, UNSPECIFIED SEASONALITY, UNSPECIFIED TRIGGER: ICD-10-CM

## 2021-12-27 DIAGNOSIS — G47.33 OBSTRUCTIVE SLEEP APNEA: ICD-10-CM

## 2021-12-27 DIAGNOSIS — R05.9 COUGH: Primary | ICD-10-CM

## 2021-12-27 PROCEDURE — 99214 OFFICE O/P EST MOD 30 MIN: CPT | Performed by: NURSE PRACTITIONER

## 2021-12-27 RX ORDER — ALBUTEROL SULFATE 90 UG/1
2 AEROSOL, METERED RESPIRATORY (INHALATION) EVERY 4 HOURS PRN
Qty: 18 G | Refills: 6 | Status: SHIPPED | OUTPATIENT
Start: 2021-12-27 | End: 2022-01-26

## 2021-12-27 RX ORDER — PRASTERONE (DHEA) 50 MG
50 TABLET ORAL DAILY
COMMUNITY
End: 2022-09-14 | Stop reason: DRUGHIGH

## 2021-12-27 NOTE — PATIENT INSTRUCTIONS
Sleep Apnea  Sleep apnea affects breathing during sleep. It causes breathing to stop for a short time or to become shallow. It can also increase the risk of:  · Heart attack.  · Stroke.  · Being very overweight (obese).  · Diabetes.  · Heart failure.  · Irregular heartbeat.  The goal of treatment is to help you breathe normally again.  What are the causes?  There are three kinds of sleep apnea:  · Obstructive sleep apnea. This is caused by a blocked or collapsed airway.  · Central sleep apnea. This happens when the brain does not send the right signals to the muscles that control breathing.  · Mixed sleep apnea. This is a combination of obstructive and central sleep apnea.  The most common cause of this condition is a collapsed or blocked airway. This can happen if:  · Your throat muscles are too relaxed.  · Your tongue and tonsils are too large.  · You are overweight.  · Your airway is too small.  What increases the risk?  · Being overweight.  · Smoking.  · Having a small airway.  · Being older.  · Being male.  · Drinking alcohol.  · Taking medicines to calm yourself (sedatives or tranquilizers).  · Having family members with the condition.  What are the signs or symptoms?  · Trouble staying asleep.  · Being sleepy or tired during the day.  · Getting angry a lot.  · Loud snoring.  · Headaches in the morning.  · Not being able to focus your mind (concentrate).  · Forgetting things.  · Less interest in sex.  · Mood swings.  · Personality changes.  · Feelings of sadness (depression).  · Waking up a lot during the night to pee (urinate).  · Dry mouth.  · Sore throat.  How is this diagnosed?  · Your medical history.  · A physical exam.  · A test that is done when you are sleeping (sleep study). The test is most often done in a sleep lab but may also be done at home.  How is this treated?    · Sleeping on your side.  · Using a medicine to get rid of mucus in your nose (decongestant).  · Avoiding the use of alcohol,  medicines to help you relax, or certain pain medicines (narcotics).  · Losing weight, if needed.  · Changing your diet.  · Not smoking.  · Using a machine to open your airway while you sleep, such as:  ? An oral appliance. This is a mouthpiece that shifts your lower jaw forward.  ? A CPAP device. This device blows air through a mask when you breathe out (exhale).  ? An EPAP device. This has valves that you put in each nostril.  ? A BPAP device. This device blows air through a mask when you breathe in (inhale) and breathe out.  · Having surgery if other treatments do not work.  It is important to get treatment for sleep apnea. Without treatment, it can lead to:  · High blood pressure.  · Coronary artery disease.  · In men, not being able to have an erection (impotence).  · Reduced thinking ability.  Follow these instructions at home:  Lifestyle  · Make changes that your doctor recommends.  · Eat a healthy diet.  · Lose weight if needed.  · Avoid alcohol, medicines to help you relax, and some pain medicines.  · Do not use any products that contain nicotine or tobacco, such as cigarettes, e-cigarettes, and chewing tobacco. If you need help quitting, ask your doctor.  General instructions  · Take over-the-counter and prescription medicines only as told by your doctor.  · If you were given a machine to use while you sleep, use it only as told by your doctor.  · If you are having surgery, make sure to tell your doctor you have sleep apnea. You may need to bring your device with you.  · Keep all follow-up visits as told by your doctor. This is important.  Contact a doctor if:  · The machine that you were given to use during sleep bothers you or does not seem to be working.  · You do not get better.  · You get worse.  Get help right away if:  · Your chest hurts.  · You have trouble breathing in enough air.  · You have an uncomfortable feeling in your back, arms, or stomach.  · You have trouble talking.  · One side of your  body feels weak.  · A part of your face is hanging down.  These symptoms may be an emergency. Do not wait to see if the symptoms will go away. Get medical help right away. Call your local emergency services (911 in the [place] Do not drive yourself to the hospital.  Summary  · This condition affects breathing during sleep.  · The most common cause is a collapsed or blocked airway.  · The goal of treatment is to help you breathe normally while you sleep.  This information is not intended to replace advice given to you by your health care provider. Make sure you discuss any questions you have with your health care provider.  Document Revised: 10/04/2019 Document Reviewed: 08/13/2019  [employer] Patient Education © 2021 [employer]

## 2022-01-20 ENCOUNTER — TELEPHONE (OUTPATIENT)
Dept: PULMONOLOGY | Facility: CLINIC | Age: 52
End: 2022-01-20

## 2022-01-20 NOTE — TELEPHONE ENCOUNTER
Has she been exposed to the daughter?  Is she fully vaccinated?      IF YOU  Were exposed to COVID-19 and are NOT up-to-date on COVID-19 vaccinations    Quarantine for at least 5 days    Stay home  Stay home and quarantine for at least 5 full days.    Wear a well-fitted mask if you must be around others in your home.    Get tested  Even if you don’t develop symptoms, get tested at least 5 days after you last had close contact with someone with COVID-19.    After quarantine    Watch for symptoms  Watch for symptoms until 10 days after you last had close contact with someone with COVID-19.    If you develop symptoms  Isolate immediately and get tested. Continue to stay home until you know the results. Wear a well-fitted mask around others.    Take precautions until day 10    Wear a mask  Wear a well-fitted mask for 10 full days any time you are around others inside your home or in public. Do not go to places where you are unable to wear a mask.    Avoid travel    Avoid being around people who are at high risk

## 2022-01-20 NOTE — TELEPHONE ENCOUNTER
Pt was sick last week. Had a neg covid test.  Pt feels she did have covid.  Symptoms have resolved.  Daughter is now confirmed positive.  I have rescheduled the appt with Le till 2/14.      My question is do you want her to do the PFT on that date or wait 90 days.  Also, she has a CT scan sched for tomorrow.  Should she go ahead and do that since she is vaccinated or reschedule CT as well?

## 2022-01-20 NOTE — TELEPHONE ENCOUNTER
Pt aware.  Pt is calling to resched the CT for 10 days out.  PFT/appt resched past 10 days.  Pt is aware if develop symptoms will need to resched the PFT.

## 2022-01-20 NOTE — TELEPHONE ENCOUNTER
Follow CDC guidelines as noted below.  10 days from date of exposure before CT or PFTs if she does not develop symptoms.

## 2022-01-21 ENCOUNTER — APPOINTMENT (OUTPATIENT)
Dept: LAB | Facility: HOSPITAL | Age: 52
End: 2022-01-21

## 2022-01-21 ENCOUNTER — APPOINTMENT (OUTPATIENT)
Dept: CT IMAGING | Facility: HOSPITAL | Age: 52
End: 2022-01-21

## 2022-02-08 ENCOUNTER — LAB (OUTPATIENT)
Dept: LAB | Facility: HOSPITAL | Age: 52
End: 2022-02-08

## 2022-02-08 DIAGNOSIS — Z01.812 ENCOUNTER FOR PREOPERATIVE SCREENING LABORATORY TESTING FOR COVID-19 VIRUS: ICD-10-CM

## 2022-02-08 DIAGNOSIS — Z20.822 ENCOUNTER FOR PREOPERATIVE SCREENING LABORATORY TESTING FOR COVID-19 VIRUS: ICD-10-CM

## 2022-02-08 LAB — SARS-COV-2 ORF1AB RESP QL NAA+PROBE: NOT DETECTED

## 2022-02-08 PROCEDURE — C9803 HOPD COVID-19 SPEC COLLECT: HCPCS

## 2022-02-08 PROCEDURE — U0004 COV-19 TEST NON-CDC HGH THRU: HCPCS

## 2022-02-09 ENCOUNTER — HOSPITAL ENCOUNTER (OUTPATIENT)
Dept: CT IMAGING | Facility: HOSPITAL | Age: 52
Discharge: HOME | End: 2022-02-09
Admitting: NURSE PRACTITIONER

## 2022-02-09 DIAGNOSIS — R05.9 COUGH: ICD-10-CM

## 2022-02-09 LAB — CREAT BLDA-MCNC: 0.8 MG/DL (ref 0.6–1.3)

## 2022-02-09 PROCEDURE — 82565 ASSAY OF CREATININE: CPT

## 2022-02-09 PROCEDURE — 71260 CT THORAX DX C+: CPT

## 2022-02-09 PROCEDURE — 25010000002 IOPAMIDOL 61 % SOLUTION: Performed by: NURSE PRACTITIONER

## 2022-02-09 RX ADMIN — IOPAMIDOL 100 ML: 612 INJECTION, SOLUTION INTRAVENOUS at 15:43

## 2022-02-11 ENCOUNTER — OFFICE VISIT (OUTPATIENT)
Dept: PULMONOLOGY | Facility: CLINIC | Age: 52
End: 2022-02-11

## 2022-02-11 DIAGNOSIS — Z20.822 ENCOUNTER FOR PREOPERATIVE SCREENING LABORATORY TESTING FOR COVID-19 VIRUS: Primary | ICD-10-CM

## 2022-02-11 DIAGNOSIS — R05.9 COUGH: ICD-10-CM

## 2022-02-11 DIAGNOSIS — Z01.812 ENCOUNTER FOR PREOPERATIVE SCREENING LABORATORY TESTING FOR COVID-19 VIRUS: Primary | ICD-10-CM

## 2022-02-11 PROCEDURE — 94727 GAS DIL/WSHOT DETER LNG VOL: CPT | Performed by: NURSE PRACTITIONER

## 2022-02-11 PROCEDURE — 94010 BREATHING CAPACITY TEST: CPT | Performed by: NURSE PRACTITIONER

## 2022-02-11 PROCEDURE — 94729 DIFFUSING CAPACITY: CPT | Performed by: NURSE PRACTITIONER

## 2022-02-11 NOTE — PROGRESS NOTES
" Le Figueroa Ruthie APRREY  [facility]   Respiratory Disease Clinic  [address] [place] [address]  Phone: [phone]  Fax: [phone]       Chief Complaint  Cough    Subjective    History of Present Illness    Bre Birch presents to [facility] PULMONARY & CRITICAL CARE MEDICINE   History of Present Illness   Patient presents for follow-up of cough.  The patient states that her cough has improved.  CT of the chest was reviewed as noted below.  The patient uses noninvasive positive pressure ventilation for obstructive sleep apnea.  Pulmonary function test was consistent with restrictive lung disease.  This is likely secondary to her known obstructive sleep apnea and overweight status.  The patient denies fevers, chills, cough or purulent sputum.  Objective   Vital Signs:   /78   Pulse 86   Ht 165.1 cm (65\")   Wt 81.6 kg (180 lb)   SpO2 98% Comment: RA  BMI 29.95 kg/m²     Physical Exam  Vitals reviewed.   Constitutional:       General: She is not in acute distress.     Appearance: She is well-developed and overweight.      Interventions: Face mask in place.   HENT:      Head: Normocephalic and atraumatic.   Eyes:      General: No scleral icterus.     Conjunctiva/sclera: Conjunctivae normal.      Pupils: Pupils are equal, round, and reactive to light.   Cardiovascular:      Rate and Rhythm: Normal rate.   Pulmonary:      Effort: Pulmonary effort is normal. No respiratory distress.      Breath sounds: Normal breath sounds. No wheezing or rales.   Musculoskeletal:         General: Normal range of motion.      Cervical back: Normal range of motion and neck supple.   Skin:     General: Skin is warm and dry.   Neurological:      Mental Status: She is alert and oriented to person, place, and time.   Psychiatric:         Behavior: Behavior normal.         Thought Content: Thought content normal.         Judgment: Judgment normal.        Result Review :  The following " data was reviewed by: VINAY Yanez on 2022:  CT Chest With Contrast Diagnostic (2022 15:43)      PFT Values        Some values may be hidden. Unless noted otherwise, only the newest values recorded on each date are displayed.         Old Values PFT Results 22   No data to display.      Pre Drug PFT Results 22   FVC 78   FEV1 82   FEF 25-75% 98   FEV1/FVC 84.42      Post Drug PFT Results 22   No data to display.      Other Tests PFT Results 22   TLC 96      DLCO 109   D/VAsb 128             Results for orders placed in visit on 22    Pulmonary Function Test    Narrative  Pulmonary Function Test  Performed by: Ivet Lobo, RRT  Authorized by: Le Hendricks APRN    Pre Drug % Predicted  FVC: 78%  FEV1: 82%  FEF 25-75%: 98%  FEV1/FVC: 84.42%  T%  RV: 100%  DLCO: 109%  D/VAsb: 128%    Interpretation  Spirometry  Spirometry shows moderate restriction. midflow is normal.  Review of FVL curve  Patient's effort is normal.  Lung Volume Measurements  Measurements show normal results.  Diffusion Capacity  The patient's diffusion capacity is normal.  Diffusion capacity is normal when corrected for alveolar volume.           Assessment and Plan  Diagnoses and all orders for this visit:    1. Cough (Primary)  Comments:  The patient's cough has resolved.    2. Allergic rhinitis, unspecified seasonality, unspecified trigger  Comments:  Continue Allegra and Flonase    3. Obstructive sleep apnea  Comments:  Continue noninvasive positive pressure ventilation device.    4. Gastroesophageal reflux disease, unspecified whether esophagitis present  Comments:  Continue Dexilant.    5. Overweight  Comments:  Recommend weight loss.      Health maintenance:   Influenza vaccine: yes  Pneumovax 23: no   Prevnar 13: no   Covid 19: yes x 2   Follow Up  VINAY Yanez  2022  17:59 CST  Return if symptoms worsen or fail to improve.  Patient was given  instructions and counseling regarding her condition or for health maintenance advice. Please see specific information pulled into the AVS if appropriate.   Please note that portions of this note were completed with a voice recognition program.

## 2022-02-11 NOTE — PROCEDURES
Pulmonary Function Test  Performed by: Ivet Lobo, RRT  Authorized by: Le Hendricks APRN      Pre Drug % Predicted    FVC: 78%   FEV1: 82%   FEF 25-75%: 98%   FEV1/FVC: 84.42%   T%   RV: 100%   DLCO: 109%   D/VAsb: 128%    Interpretation   Spirometry   Spirometry shows moderate restriction. midflow is normal.  Review of FVL curve   Patient's effort is normal.   Lung Volume Measurements  Measurements show normal results.   Diffusion Capacity  The patient's diffusion capacity is normal.  Diffusion capacity is normal when corrected for alveolar volume.

## 2022-02-14 ENCOUNTER — OFFICE VISIT (OUTPATIENT)
Dept: PULMONOLOGY | Facility: CLINIC | Age: 52
End: 2022-02-14

## 2022-02-14 VITALS
HEIGHT: 65 IN | OXYGEN SATURATION: 98 % | WEIGHT: 180 LBS | SYSTOLIC BLOOD PRESSURE: 140 MMHG | DIASTOLIC BLOOD PRESSURE: 78 MMHG | BODY MASS INDEX: 29.99 KG/M2 | HEART RATE: 86 BPM

## 2022-02-14 DIAGNOSIS — G47.33 OBSTRUCTIVE SLEEP APNEA: Chronic | ICD-10-CM

## 2022-02-14 DIAGNOSIS — K21.9 GASTROESOPHAGEAL REFLUX DISEASE, UNSPECIFIED WHETHER ESOPHAGITIS PRESENT: Chronic | ICD-10-CM

## 2022-02-14 DIAGNOSIS — E66.3 OVERWEIGHT: ICD-10-CM

## 2022-02-14 DIAGNOSIS — R05.9 COUGH: Primary | ICD-10-CM

## 2022-02-14 DIAGNOSIS — J30.9 ALLERGIC RHINITIS, UNSPECIFIED SEASONALITY, UNSPECIFIED TRIGGER: Chronic | ICD-10-CM

## 2022-02-14 PROCEDURE — 99214 OFFICE O/P EST MOD 30 MIN: CPT | Performed by: NURSE PRACTITIONER

## 2022-07-12 ENCOUNTER — TRANSCRIBE ORDERS (OUTPATIENT)
Dept: ADMINISTRATIVE | Facility: HOSPITAL | Age: 52
End: 2022-07-12

## 2022-07-12 DIAGNOSIS — Z12.31 BREAST CANCER SCREENING BY MAMMOGRAM: Primary | ICD-10-CM

## 2022-07-26 ENCOUNTER — HOSPITAL ENCOUNTER (OUTPATIENT)
Dept: MAMMOGRAPHY | Facility: HOSPITAL | Age: 52
Discharge: HOME | End: 2022-07-26
Admitting: SPECIALIST

## 2022-07-26 PROCEDURE — 77063 BREAST TOMOSYNTHESIS BI: CPT

## 2022-07-26 PROCEDURE — 77067 SCR MAMMO BI INCL CAD: CPT

## 2022-09-01 ENCOUNTER — OFFICE VISIT (OUTPATIENT)
Dept: OBSTETRICS AND GYNECOLOGY | Facility: CLINIC | Age: 52
End: 2022-09-01

## 2022-09-01 VITALS
WEIGHT: 170 LBS | DIASTOLIC BLOOD PRESSURE: 68 MMHG | BODY MASS INDEX: 28.32 KG/M2 | SYSTOLIC BLOOD PRESSURE: 118 MMHG | HEIGHT: 65 IN

## 2022-09-01 DIAGNOSIS — N60.01 BILATERAL BREAST CYSTS: ICD-10-CM

## 2022-09-01 DIAGNOSIS — E66.3 OVERWEIGHT WITH BODY MASS INDEX (BMI) OF 28 TO 28.9 IN ADULT: ICD-10-CM

## 2022-09-01 DIAGNOSIS — Z01.419 WELL WOMAN EXAM WITH ROUTINE GYNECOLOGICAL EXAM: Primary | ICD-10-CM

## 2022-09-01 DIAGNOSIS — Z78.9 NONSMOKER: ICD-10-CM

## 2022-09-01 DIAGNOSIS — N60.02 BILATERAL BREAST CYSTS: ICD-10-CM

## 2022-09-01 PROCEDURE — G0123 SCREEN CERV/VAG THIN LAYER: HCPCS | Performed by: NURSE PRACTITIONER

## 2022-09-01 PROCEDURE — 99396 PREV VISIT EST AGE 40-64: CPT | Performed by: NURSE PRACTITIONER

## 2022-09-01 PROCEDURE — 87624 HPV HI-RISK TYP POOLED RSLT: CPT | Performed by: NURSE PRACTITIONER

## 2022-09-01 RX ORDER — DESVENLAFAXINE SUCCINATE 50 MG/1
50 TABLET, EXTENDED RELEASE ORAL DAILY
COMMUNITY
Start: 2022-07-13

## 2022-09-01 RX ORDER — SEMAGLUTIDE 1.34 MG/ML
INJECTION, SOLUTION SUBCUTANEOUS
COMMUNITY
Start: 2022-08-10

## 2022-09-02 LAB
DHEA-S SERPL-MCNC: 361 UG/DL (ref 41.2–243.7)
GEN CATEG CVX/VAG CYTO-IMP: NORMAL
HPV I/H RISK 4 DNA CVX QL PROBE+SIG AMP: NOT DETECTED
LAB AP CASE REPORT: NORMAL
LAB AP GYN ADDITIONAL INFORMATION: NORMAL
LAB AP GYN OTHER FINDINGS: NORMAL
Lab: NORMAL
PATH INTERP SPEC-IMP: NORMAL
STAT OF ADQ CVX/VAG CYTO-IMP: NORMAL

## 2022-09-14 DIAGNOSIS — R79.89 ELEVATED DHEA: ICD-10-CM

## 2022-09-14 DIAGNOSIS — B37.31 YEAST VAGINITIS: Primary | ICD-10-CM

## 2022-09-14 NOTE — PROGRESS NOTES
Bre Birch is a 52 y.o.      Chief Complaint   Patient presents with   • Gynecologic Exam     Pt here for annual. Last PAP was 2020 and was normal. Last MAMMO was 2022 and was normal.            HPI - 52 year old in for gyn exam.  She has had an ablation and TL.  She has a small amount of spotting about every 3 months.  She is taking DHEA.    The following portions of the patient's history were reviewed and updated as appropriate:vital signs, allergies, current medications, past family history, past medical history, past social history, past surgical history and problem list.      Current Outpatient Medications:   •  cholecalciferol (VITAMIN D3) 1000 units tablet, Take 1,000 Units by mouth Daily., Disp: , Rfl:   •  desvenlafaxine (PRISTIQ) 50 MG 24 hr tablet, Take 50 mg by mouth Daily., Disp: , Rfl:   •  dexlansoprazole (DEXILANT) 60 MG capsule, TAKE 1 CAPSULE BY MOUTH DAILY, Disp: , Rfl:   •  DHEA 50 MG tablet, Take 50 mg by mouth Daily., Disp: , Rfl:   •  fexofenadine (Allegra Allergy) 180 MG tablet, Take 1 tablet by mouth Daily., Disp: 30 tablet, Rfl: 0  •  levothyroxine (SYNTHROID, LEVOTHROID) 75 MCG tablet, Take 75 mcg by mouth Daily., Disp: , Rfl:   •  Omega-3 Fatty Acids (FISH OIL) 1000 MG capsule capsule, Take 2,000 mg by mouth Daily With Breakfast., Disp: , Rfl:   •  Ozempic, 0.25 or 0.5 MG/DOSE, 2 MG/1.5ML solution pen-injector, , Disp: , Rfl:     Review of Systems   Constitutional: Negative for chills and fever.   HENT: Negative for congestion, ear pain, mouth sores, rhinorrhea, sneezing, sore throat and tinnitus.    Eyes: Negative for blurred vision, redness, itching and visual disturbance.   Respiratory: Negative for apnea, choking and shortness of breath.    Cardiovascular: Negative for chest pain and palpitations.   Gastrointestinal: Positive for GERD. Negative for abdominal distention, nausea and vomiting.        Hiatal Hernia  History of diverticulitis   Endocrine: Negative for  "cold intolerance, polydipsia and polyuria.        Hypothyroidism   Genitourinary: Negative for breast pain, decreased urine volume, flank pain, genital sores, pelvic pain, vaginal bleeding and vaginal pain.        Ablation, T/L,  Spotting every 3-4 months.   Musculoskeletal: Negative for gait problem and neck pain.   Skin: Negative for color change and pallor.   Neurological: Negative for dizziness, tremors, seizures, speech difficulty, light-headedness and memory problem.   Psychiatric/Behavioral: Positive for sleep disturbance (sleep apnea). Negative for behavioral problems, self-injury and suicidal ideas.         Objective     /68   Ht 165.1 cm (65\")   Wt 77.1 kg (170 lb)   Breastfeeding No   BMI 28.29 kg/m²       Physical Exam  Vitals and nursing note reviewed. Exam conducted with a chaperone present.   Constitutional:       General: She is not in acute distress.     Appearance: Normal appearance. She is well-developed. She is not diaphoretic.   HENT:      Head: Normocephalic and atraumatic.      Right Ear: External ear normal.      Left Ear: External ear normal.      Nose: Nose normal. No congestion or rhinorrhea.   Eyes:      General: No scleral icterus.        Right eye: No discharge.         Left eye: No discharge.      Conjunctiva/sclera: Conjunctivae normal.   Neck:      Thyroid: No thyroid mass or thyromegaly.      Vascular: No carotid bruit.      Trachea: No tracheal deviation.   Cardiovascular:      Rate and Rhythm: Normal rate and regular rhythm.      Heart sounds: Normal heart sounds. No murmur heard.  Pulmonary:      Effort: Pulmonary effort is normal. No respiratory distress.      Breath sounds: Normal breath sounds. No wheezing.   Chest:   Breasts: Breasts are symmetrical.      Right: Normal. No swelling, bleeding, inverted nipple, nipple discharge, skin change, tenderness, axillary adenopathy or supraclavicular adenopathy.      Left: Normal. No swelling, bleeding, inverted nipple, nipple " discharge, skin change, tenderness, axillary adenopathy or supraclavicular adenopathy.        Comments: Bilateral fibrocystic disease . Mammogram confirms bilateral cyst in right and left breast  Patient will followup with Dr. Thomas  Abdominal:      General: Abdomen is flat. There is no distension.      Palpations: Abdomen is soft.      Tenderness: There is no abdominal tenderness. There is no right CVA tenderness, left CVA tenderness, guarding or rebound.      Hernia: No hernia is present. There is no hernia in the umbilical area, ventral area, left inguinal area or right inguinal area.   Genitourinary:     General: Normal vulva.      Exam position: Lithotomy position.      Labia:         Right: No rash, tenderness, lesion or injury.         Left: No rash, tenderness, lesion or injury.       Urethra: No prolapse or urethral lesion.      Vagina: Normal. No signs of injury and foreign body. No vaginal discharge, erythema, tenderness or bleeding.      Cervix: Normal.      Uterus: Normal. Not enlarged, not fixed and not tender.       Adnexa: Right adnexa normal and left adnexa normal.        Right: No mass, tenderness or fullness.          Left: No mass, tenderness or fullness.        Rectum: Normal. No mass or external hemorrhoid.      Comments:   BSU normal  Urethral meatus  Normal  Perineum  Normal  Musculoskeletal:         General: No tenderness. Normal range of motion.      Cervical back: Normal range of motion and neck supple.   Lymphadenopathy:      Head:      Right side of head: No submental, submandibular, tonsillar, preauricular, posterior auricular or occipital adenopathy.      Left side of head: No submental, submandibular, tonsillar, preauricular, posterior auricular or occipital adenopathy.      Cervical: No cervical adenopathy.      Right cervical: No superficial, deep or posterior cervical adenopathy.     Left cervical: No superficial, deep or posterior cervical adenopathy.      Upper Body:      Right  upper body: No supraclavicular, axillary or pectoral adenopathy.      Left upper body: No supraclavicular, axillary or pectoral adenopathy.      Lower Body: No right inguinal adenopathy. No left inguinal adenopathy.   Skin:     General: Skin is warm and dry.      Findings: No bruising, erythema, lesion or rash.   Neurological:      General: No focal deficit present.      Mental Status: She is alert and oriented to person, place, and time.      Motor: No tremor.      Coordination: Coordination normal.   Psychiatric:         Attention and Perception: Attention normal.         Mood and Affect: Mood normal.         Speech: Speech normal.         Behavior: Behavior normal.         Thought Content: Thought content normal.         Cognition and Memory: Cognition and memory normal.         Judgment: Judgment normal.            Assessment & Plan       Diagnoses and all orders for this visit:    1. Well woman exam with routine gynecological exam (Primary)  Comments:  Patient has a day of light bleeding about q 3-4 months since ablation.  She has no sig. hot flashes, no history of abnormal paps or breast cancer.  She takes   Orders:  -     Liquid-based Pap Smear, Screening  -     DHEA-Sulfate    361 elevated while taking DHEA 50 mgm.  -     HPV DNA Probe, Direct - ThinPrep Vial, Cervix    2. Bilateral breast cysts  Comments:  per mammogram 7/2022  To see Dr. Thomas.    3. Overweight with body mass index (BMI) of 28 to 28.9 in adult  Comments:  Counseled that BMI is outside the desired parameters and to decrease carbs and exercise as tolerated.    4. Nonsmoker      Patient is counseled re: BSE, diet, exercise, mammogram, calcium and Vit. D3    Patient will be advised that her DHEA level is elevated and to dc DHEA 50 mgm for 4 weeks and have level rechecked and dose will be adjust  Based on her results.        Kenia Aguirre, APRN  9/14/2022

## 2022-09-14 NOTE — PROGRESS NOTES
Pap  Negative with yeast infection.  Terazol sent in to pharmacy.  DHEAS elevated on DHEA 50 mgm, dc DHEAS 50 mgm x 4 weeks and have repeat level and adjust will be adjusted based on the new lab results.  .Kim will advise.  VINAY Jameson

## 2022-09-20 ENCOUNTER — OFFICE VISIT (OUTPATIENT)
Dept: SURGERY | Facility: CLINIC | Age: 52
End: 2022-09-20

## 2022-09-20 VITALS
HEIGHT: 65 IN | SYSTOLIC BLOOD PRESSURE: 124 MMHG | DIASTOLIC BLOOD PRESSURE: 78 MMHG | BODY MASS INDEX: 29.32 KG/M2 | WEIGHT: 176 LBS

## 2022-09-20 DIAGNOSIS — N60.11 FIBROCYSTIC DISEASE OF BOTH BREASTS: Primary | ICD-10-CM

## 2022-09-20 DIAGNOSIS — N60.12 FIBROCYSTIC DISEASE OF BOTH BREASTS: Primary | ICD-10-CM

## 2022-09-20 PROCEDURE — 99212 OFFICE O/P EST SF 10 MIN: CPT | Performed by: SPECIALIST

## 2022-09-20 NOTE — PROGRESS NOTES
Patient: Bre Birch    YOB: 1970    Date: 09/20/2022    Primary Care Provider: Rik Berry MD    Chief Complaint   Patient presents with   • Breast Pain     Mrs. Birch is here for a follow up of a breast mass.       Subjective .     History of present illness:   She is here for yearly follow-up    The following portions of the patient's history were reviewed and updated as appropriate: allergies, current medications, past family history, past medical history, past social history, past surgical history and problem list.    History:  Past Medical History:   Diagnosis Date   • Anxiety    • Hyperlipidemia    • Hypothyroid    • Obstructive sleep apnea           Past Surgical History:   Procedure Laterality Date   • BREAST BIOPSY Left 2 yrs ago   • CHOLECYSTECTOMY     • COLONOSCOPY     • ENDOMETRIAL ABLATION     • ENDOSCOPY     • FOOT ARTHRODESIS, MODIFIED DOUGLASS     • TUBAL ABDOMINAL LIGATION         Family History   Problem Relation Age of Onset   • Hypertension Father    • Stroke Mother    • Hypertension Brother    • No Known Problems Daughter    • No Known Problems Paternal Grandmother    • No Known Problems Maternal Grandmother    • Cancer Maternal Grandfather    • No Known Problems Maternal Aunt    • No Known Problems Paternal Aunt    • BRCA 1/2 Neg Hx    • Breast cancer Neg Hx    • Colon cancer Neg Hx    • Endometrial cancer Neg Hx    • Ovarian cancer Neg Hx        Social History     Tobacco Use   • Smoking status: Never Smoker   • Smokeless tobacco: Never Used   Vaping Use   • Vaping Use: Never used   Substance Use Topics   • Alcohol use: No   • Drug use: No       Allergies:  Allergies   Allergen Reactions   • Bactrim [Sulfamethoxazole-Trimethoprim] Anaphylaxis   • Azithromycin Hives       Medications:     Current Outpatient Medications:   •  cholecalciferol (VITAMIN D3) 1000 units tablet, Take 1,000 Units by mouth Daily., Disp: , Rfl:   •  desvenlafaxine (PRISTIQ) 50 MG 24 hr tablet,  "Take 50 mg by mouth Daily., Disp: , Rfl:   •  dexlansoprazole (DEXILANT) 60 MG capsule, TAKE 1 CAPSULE BY MOUTH DAILY, Disp: , Rfl:   •  fexofenadine (Allegra Allergy) 180 MG tablet, Take 1 tablet by mouth Daily., Disp: 30 tablet, Rfl: 0  •  levothyroxine (SYNTHROID, LEVOTHROID) 75 MCG tablet, Take 75 mcg by mouth Daily., Disp: , Rfl:   •  Omega-3 Fatty Acids (FISH OIL) 1000 MG capsule capsule, Take 2,000 mg by mouth Daily With Breakfast., Disp: , Rfl:   •  Ozempic, 0.25 or 0.5 MG/DOSE, 2 MG/1.5ML solution pen-injector, , Disp: , Rfl:   •  terconazole (TERAZOL 7) 0.4 % vaginal cream, 1 applicator vaginally at HS x 7, Disp: 1 each, Rfl: 1    Vital Signs:   Vitals:    09/20/22 0831   BP: 124/78   BP Location: Left arm   Patient Position: Sitting   Cuff Size: Adult   Weight: 79.8 kg (176 lb)   Height: 165.1 cm (65\")       Over the summer she noticed swelling in her right breast.  She has had multiple cysts that we have addressed in the left breast in the past but never in the right.  This swelled to quite some size but then subsided.  She noticed it return albeit not as large several weeks ago but is again subsided.  She had a mammogram back in July which was normal.  On exam, she again has fibrocystic changes.  She reports this area being at about the 10 o'clock position in the lateral aspect of her right breast in the upper outer quadrant.  I feel no significant abnormalities there other than moderate fibrocystic disease.  She does have some mild tenderness in that area as well.  She has mild global tenderness in the right breast.  There is no nipple retraction or discharge.  Axilla and supraclavicular regions are clear.    Results Review:   I reviewed the patient's new clinical results.        Assessment / Plan:    Diagnoses and all orders for this visit:    1. Fibrocystic disease of both breasts (Primary)        We will continue routine mammograms and exams on a yearly basis for her.  If in the interim she develops " cysts and wants reevaluation she will call and we will get her in here to look and possibly aspirate.  No further evaluation with ultrasound or other modalities as necessary at this time      Electronically signed by Jovana Hayden MD  09/20/22  09:29 CDT

## 2022-12-03 ENCOUNTER — NURSE TRIAGE (OUTPATIENT)
Dept: CALL CENTER | Facility: HOSPITAL | Age: 52
End: 2022-12-03

## 2022-12-03 NOTE — TELEPHONE ENCOUNTER
"Talked at length with patient about cardiac symptoms and how they can be different in women.  She understands what to look for to seek emergency treatment.     Reason for Disposition  • [1] MODERATE pain (e.g., interferes with normal activities) AND [2] present > 3 days    Additional Information  • Negative: Passed out (i.e., lost consciousness, collapsed and was not responding)  • Negative: Shock suspected (e.g., cold/pale/clammy skin, too weak to stand, low BP, rapid pulse)  • Negative: [1] Similar pain previously AND [2] it was from \"heart attack\"  • Negative: [1] Similar pain previously AND [2] it was from \"angina\" AND [3] not relieved by nitroglycerin  • Negative: Sounds like a life-threatening emergency to the triager  • Negative: Followed a shoulder injury  • Negative: Chest pain  • Negative: Difficulty breathing or unusual sweating (e.g., sweating without exertion)  • Negative: [1] Pain lasting > 5 minutes AND [2] pain also present in chest  (Exception: pain is clearly made worse by movement)  • Negative: [1] Age > 40 AND [2] no obvious cause AND [3] pain even when not moving the arm    (Exception: pain is clearly made worse by moving arm or bending neck)  • Negative: [1] SEVERE pain AND [2] not improved 2 hours after pain medicine  • Negative: [1] Red area or streak AND [2] fever  • Negative: [1] Swollen joint AND [2] fever  • Negative: Patient sounds very sick or weak to the triager  • Negative: Entire arm is swollen  • Negative: Weakness (i.e., loss of strength) in hand or fingers    (Exception: not truly weak; hand feels weak because of pain)  • Negative: [1] Shoulder pains with exertion (e.g., walking) AND [2] pain goes away on resting AND [3] not present now  • Negative: [1] Painful rash AND [2] multiple small blisters grouped together (i.e., dermatomal distribution or \"band\" or \"stripe\")  • Negative: Looks like a boil, infected sore, deep ulcer or other infected rash (spreading redness, pus)  • " "Negative: [1] Localized rash is very painful AND [2] no fever  • Negative: Numbness (i.e., loss of sensation) in hand or fingers  • Negative: [1] Unable to use arm at all AND [2] because of shoulder pain or stiffness    Answer Assessment - Initial Assessment Questions  1. ONSET: \"When did the pain start?\"      3 days  2. LOCATION: \"Where is the pain located?\"      Left shoulder and left arm  3. PAIN: \"How bad is the pain?\" (Scale 1-10; or mild, moderate, severe)    - MILD (1-3): doesn't interfere with normal activities    - MODERATE (4-7): interferes with normal activities (e.g., work or school) or awakens from sleep    - SEVERE (8-10): excruciating pain, unable to do any normal activities, unable to move arm at all due to pain      Dull ache right now, but it does get worse  4. WORK OR EXERCISE: \"Has there been any recent work or exercise that involved this part of the body?\"      no  5. CAUSE: \"What do you think is causing the shoulder pain?\"      Flu onset seemed to start this  6. OTHER SYMPTOMS: \"Do you have any other symptoms?\" (e.g., neck pain, swelling, rash, fever, numbness, weakness)      Denies pain in jaw, teeth, breast, neck, mid chest. No rash.    7. PREGNANCY: \"Is there any chance you are pregnant?\" \"When was your last menstrual period?\"      na    Protocols used: SHOULDER PAIN-ADULT-AH      "

## 2022-12-05 ENCOUNTER — TRANSCRIBE ORDERS (OUTPATIENT)
Dept: ADMINISTRATIVE | Facility: HOSPITAL | Age: 52
End: 2022-12-05

## 2022-12-05 DIAGNOSIS — J34.2 DEVIATED NASAL SEPTUM: Primary | ICD-10-CM

## 2022-12-12 ENCOUNTER — HOSPITAL ENCOUNTER (OUTPATIENT)
Dept: CT IMAGING | Facility: HOSPITAL | Age: 52
Discharge: HOME | End: 2022-12-12
Admitting: NURSE PRACTITIONER

## 2022-12-12 DIAGNOSIS — J34.2 DEVIATED NASAL SEPTUM: ICD-10-CM

## 2022-12-12 PROCEDURE — 70486 CT MAXILLOFACIAL W/O DYE: CPT

## 2022-12-16 ENCOUNTER — TELEPHONE (OUTPATIENT)
Dept: OTOLARYNGOLOGY | Facility: CLINIC | Age: 52
End: 2022-12-16

## 2022-12-28 LAB — DHEA-S SERPL-MCNC: 117 UG/DL (ref 41.2–243.7)

## 2023-03-28 ENCOUNTER — TRANSCRIBE ORDERS (OUTPATIENT)
Dept: ADMINISTRATIVE | Facility: HOSPITAL | Age: 53
End: 2023-03-28
Payer: COMMERCIAL

## 2023-03-28 ENCOUNTER — HOSPITAL ENCOUNTER (OUTPATIENT)
Dept: ULTRASOUND IMAGING | Facility: HOSPITAL | Age: 53
Discharge: HOME | End: 2023-03-28
Admitting: NURSE PRACTITIONER
Payer: COMMERCIAL

## 2023-03-28 DIAGNOSIS — M79.661 PAIN IN RIGHT LOWER LEG: Primary | ICD-10-CM

## 2023-03-28 DIAGNOSIS — M79.661 PAIN IN RIGHT LOWER LEG: ICD-10-CM

## 2023-03-28 PROCEDURE — 93971 EXTREMITY STUDY: CPT

## 2023-05-03 ENCOUNTER — OFFICE VISIT (OUTPATIENT)
Dept: OBSTETRICS AND GYNECOLOGY | Facility: CLINIC | Age: 53
End: 2023-05-03
Payer: COMMERCIAL

## 2023-05-03 VITALS
DIASTOLIC BLOOD PRESSURE: 88 MMHG | BODY MASS INDEX: 27.66 KG/M2 | HEIGHT: 65 IN | WEIGHT: 166 LBS | SYSTOLIC BLOOD PRESSURE: 112 MMHG

## 2023-05-03 DIAGNOSIS — E66.3 OVERWEIGHT WITH BODY MASS INDEX (BMI) OF 27 TO 27.9 IN ADULT: ICD-10-CM

## 2023-05-03 DIAGNOSIS — R35.0 URINARY FREQUENCY: Primary | ICD-10-CM

## 2023-05-03 DIAGNOSIS — N89.8 VAGINAL DISCHARGE: ICD-10-CM

## 2023-05-03 LAB
BILIRUB BLD-MCNC: NEGATIVE MG/DL
CLARITY, POC: CLEAR
COLOR UR: YELLOW
GLUCOSE UR STRIP-MCNC: NEGATIVE MG/DL
KETONES UR QL: NEGATIVE
LEUKOCYTE EST, POC: ABNORMAL
NITRITE UR-MCNC: NEGATIVE MG/ML
PH UR: 5 [PH] (ref 5–8)
PROT UR STRIP-MCNC: ABNORMAL MG/DL
RBC # UR STRIP: NEGATIVE /UL
SP GR UR: 1.03 (ref 1–1.03)
UROBILINOGEN UR QL: NORMAL

## 2023-05-03 RX ORDER — NITROFURANTOIN 25; 75 MG/1; MG/1
100 CAPSULE ORAL 2 TIMES DAILY
Qty: 14 CAPSULE | Refills: 0 | Status: SHIPPED | OUTPATIENT
Start: 2023-05-03 | End: 2023-05-10

## 2023-05-03 RX ORDER — PRAVASTATIN SODIUM 40 MG
TABLET ORAL
COMMUNITY
Start: 2023-03-01

## 2023-05-03 NOTE — PROGRESS NOTES
"Chief Complaint  Vaginal irritation (Pt is here with c/o vaginal itching, burning and a possible UTI.  This has been going on for about two weeks now.  Having urinary frequency, burning with urination and having some pressure as well as vaginal irritation.)    Subjective          Bre Birch presents to [facility]  History of Present Illness  The patient presents for burning and itching of her vagina.   The patient has had an ablation.   The patient denies an odor; however, she does not feel clean.   The patient denies discharge.  The patient denies changes in body wash or clothing changes.       Review of Systems   Constitutional: Negative for activity change, appetite change, fatigue and fever.   Respiratory: Negative for apnea and shortness of breath.    Cardiovascular: Negative for chest pain and palpitations.   Gastrointestinal: Negative for abdominal distention, abdominal pain, constipation, diarrhea, nausea and vomiting.   Endocrine: Negative for cold intolerance and heat intolerance.   Genitourinary: Positive for frequency. Negative for difficulty urinating, menstrual problem and vaginal discharge.        Burning and irritation.      Neurological: Negative for headaches.   Psychiatric/Behavioral: Negative for agitation and sleep disturbance.         Objective   Vital Signs:   /88   Ht 165.1 cm (65\")   Wt 75.3 kg (166 lb)   BMI 27.62 kg/m²     Physical Exam  Constitutional:       Appearance: She is well-developed.   HENT:      Head: Normocephalic.   Neck:      Trachea: No tracheal deviation.   Cardiovascular:      Rate and Rhythm: Normal rate.   Pulmonary:      Breath sounds: Normal breath sounds. No wheezing.   Abdominal:      Palpations: Abdomen is soft.      Tenderness: There is no abdominal tenderness.   Genitourinary:     Labia:         Right: No rash, tenderness or lesion.         Left: No rash, tenderness or lesion.       Vagina: No vaginal discharge, erythema or " tenderness.      Cervix: No cervical motion tenderness or discharge.      Uterus: Not deviated, not enlarged and not tender.       Adnexa:         Right: No mass, tenderness or fullness.          Left: No mass, tenderness or fullness.        Rectum: Normal.   Skin:     General: Skin is warm and dry.      Findings: No rash.   Neurological:      Mental Status: She is alert and oriented to person, place, and time.   Psychiatric:         Speech: Speech normal.         Behavior: Behavior normal.       Exam demonstrates skin irritation.     Result Review :                       Assessment and Plan      Urine was sent for culture.   Antibiotic sent to pharmacy r/t symptoms.   The patient will be informed of the culture results.     Specimen collected for mycoplasma and BV panel testing.       Diagnoses and all orders for this visit:    1. Urinary frequency (Primary)  -     POC Urinalysis Dipstick  -     Urine Culture - Urine, Urine, Random Void    2. Vaginal discharge  -     Genital Mycoplasmas PENNY, Swab - Swab, Cervix  -     NuSwab VG+ - Swab, Vagina    3. Overweight with body mass index (BMI) of 27 to 27.9 in adult    Other orders  -     nitrofurantoin, macrocrystal-monohydrate, (Macrobid) 100 MG capsule; Take 1 capsule by mouth 2 (Two) Times a Day for 7 days.  Dispense: 14 capsule; Refill: 0          BMI is >= 25 and <30. (Overweight) The following options were offered after discussion;: weight loss educational material (shared in after visit summary)       Follow Up   Return for Annual physical.    Patient was given instructions and counseling regarding her condition or for health maintenance advice. Please see specific information pulled into the AVS if appropriate.     Transcribed from ambient dictation for VINAY Linda by Rin Cardenas.  05/03/23   13:08 CDT    Patient or patient representative verbalized consent to the visit recording.  I have personally performed the services described in this  document as transcribed by the above individual, and it is both accurate and complete.  Rosanne Cardoza, APRN  5/15/2023  22:51 CDT

## 2023-05-05 LAB
BACTERIA UR CULT: ABNORMAL

## 2023-05-08 ENCOUNTER — TELEPHONE (OUTPATIENT)
Dept: OBSTETRICS AND GYNECOLOGY | Facility: CLINIC | Age: 53
End: 2023-05-08

## 2023-05-08 RX ORDER — AMOXICILLIN 875 MG/1
875 TABLET, COATED ORAL 2 TIMES DAILY
Qty: 14 TABLET | Refills: 0 | Status: SHIPPED | OUTPATIENT
Start: 2023-05-08

## 2023-05-08 NOTE — TELEPHONE ENCOUNTER
Provider: VINAY MARTINEZ  Caller: MAURICIO FLORES   Relationship to Patient: SELF    Reason for Call: PT WAS SEEN LAST Wednesday AND GIVEN ANTIBIOTICS FOR A UTI.  SHE IS STILL HAVING SYMPTOMS AND WOULD LIKE TO KNOW THE RESULTS OF HER TEST.  PLEASE CALL PT TO ADVISE

## 2023-05-11 LAB
A VAGINAE DNA VAG QL NAA+PROBE: ABNORMAL SCORE
BVAB2 DNA VAG QL NAA+PROBE: ABNORMAL SCORE
C ALBICANS DNA VAG QL NAA+PROBE: NEGATIVE
C GLABRATA DNA VAG QL NAA+PROBE: POSITIVE
C TRACH DNA VAG QL NAA+PROBE: NEGATIVE
M GENITALIUM DNA SPEC QL NAA+PROBE: NEGATIVE
M HOMINIS DNA SPEC QL NAA+PROBE: NEGATIVE
MEGA1 DNA VAG QL NAA+PROBE: ABNORMAL SCORE
N GONORRHOEA DNA VAG QL NAA+PROBE: NEGATIVE
T VAGINALIS DNA VAG QL NAA+PROBE: NEGATIVE
UREAPLASMA DNA SPEC QL NAA+PROBE: NEGATIVE

## 2023-05-12 ENCOUNTER — HOSPITAL ENCOUNTER (OUTPATIENT)
Dept: CT IMAGING | Facility: HOSPITAL | Age: 53
Discharge: HOME | End: 2023-05-12
Payer: COMMERCIAL

## 2023-05-12 ENCOUNTER — TRANSCRIBE ORDERS (OUTPATIENT)
Dept: ADMINISTRATIVE | Facility: HOSPITAL | Age: 53
End: 2023-05-12
Payer: COMMERCIAL

## 2023-05-12 DIAGNOSIS — R10.2 PERINEAL PAIN IN FEMALE: Primary | ICD-10-CM

## 2023-05-12 DIAGNOSIS — R11.2 NAUSEA AND VOMITING, UNSPECIFIED VOMITING TYPE: ICD-10-CM

## 2023-05-12 DIAGNOSIS — R10.2 PELVIC AND PERINEAL PAIN: Primary | ICD-10-CM

## 2023-05-12 DIAGNOSIS — N30.00 ACUTE CYSTITIS WITHOUT HEMATURIA: ICD-10-CM

## 2023-05-12 DIAGNOSIS — R10.31 ABDOMINAL PAIN, RLQ: ICD-10-CM

## 2023-05-12 DIAGNOSIS — R10.2 PELVIC AND PERINEAL PAIN: ICD-10-CM

## 2023-05-12 DIAGNOSIS — R10.32 LLQ PAIN: ICD-10-CM

## 2023-05-12 PROCEDURE — 74176 CT ABD & PELVIS W/O CONTRAST: CPT

## 2023-05-16 NOTE — PATIENT INSTRUCTIONS

## 2023-06-16 ENCOUNTER — TELEPHONE (OUTPATIENT)
Dept: OBSTETRICS AND GYNECOLOGY | Facility: CLINIC | Age: 53
End: 2023-06-16
Payer: COMMERCIAL

## 2023-06-16 NOTE — TELEPHONE ENCOUNTER
PT WAS LAST SEEN 5/3/23 FOR UTI SYMPTOMS AND WAS TREATED WITH AN ANTIBIOTIC. PT STATES SHE IS HAVING THE SAME SYMPTOMS OF A UTI WITH FREQUENT URGE TO URINATE. PT STATES THERE IS NO BURNING BUT SHE HAS NOTICED SLIGHT BLOOD IN HER URINE. PLEASE ADVISE PT IF SHE NEEDS TO BE SEEN.

## 2023-06-16 NOTE — TELEPHONE ENCOUNTER
Spoke with patient by phone and informed her she will need to be evaluated. I have recommended she go to a walk in clinic since it is Friday afternoon and symptoms should not be put off over the weekend. Pt voiced understanding.

## 2023-10-20 ENCOUNTER — OFFICE VISIT (OUTPATIENT)
Dept: OBSTETRICS AND GYNECOLOGY | Facility: CLINIC | Age: 53
End: 2023-10-20
Payer: COMMERCIAL

## 2023-10-20 VITALS
SYSTOLIC BLOOD PRESSURE: 122 MMHG | BODY MASS INDEX: 30.16 KG/M2 | WEIGHT: 181 LBS | HEIGHT: 65 IN | DIASTOLIC BLOOD PRESSURE: 84 MMHG

## 2023-10-20 DIAGNOSIS — Z12.31 ENCOUNTER FOR SCREENING MAMMOGRAM FOR MALIGNANT NEOPLASM OF BREAST: ICD-10-CM

## 2023-10-20 DIAGNOSIS — N60.02 BILATERAL BREAST CYSTS: ICD-10-CM

## 2023-10-20 DIAGNOSIS — N60.01 BILATERAL BREAST CYSTS: ICD-10-CM

## 2023-10-20 DIAGNOSIS — N89.8 VAGINAL DISCHARGE: ICD-10-CM

## 2023-10-20 DIAGNOSIS — Z01.419 ENCOUNTER FOR GYNECOLOGICAL EXAMINATION: Primary | ICD-10-CM

## 2023-10-20 DIAGNOSIS — Z98.890 HISTORY OF BREAST BIOPSY: ICD-10-CM

## 2023-10-20 PROCEDURE — 99396 PREV VISIT EST AGE 40-64: CPT | Performed by: NURSE PRACTITIONER

## 2023-10-20 NOTE — PROGRESS NOTES
"Chief Complaint  Annual Exam (Pt is here for an annual exam/Last pap 9/1/22 WNL/Last mammogram 7/26/22 benign *followed by Dr Hayden /Pt has no complaints today )    Subjective          Bre Birch presents to [facility]  History of Present Illness    The patient is present for a follow up.    Her urinary symptoms have resolved.    The patient declines STD testing.     Her primary care physician, Dr Hayden, orders her annual laboratory studies.    The patient is questioning whether she may have a yeast or bacterial infection.    Review of Systems   Constitutional:  Negative for activity change, appetite change, fatigue and fever.   HENT:  Negative for congestion, sore throat and trouble swallowing.    Eyes:  Negative for pain, discharge and visual disturbance.   Respiratory:  Negative for apnea, shortness of breath and wheezing.    Cardiovascular:  Negative for chest pain, palpitations and leg swelling.   Gastrointestinal:  Negative for abdominal pain, constipation and diarrhea.   Genitourinary:  Positive for vaginal discharge. Negative for frequency, pelvic pain and urgency.   Musculoskeletal:  Negative for back pain and gait problem.   Skin:  Negative for color change and rash.   Neurological:  Negative for dizziness, weakness and numbness.   Psychiatric/Behavioral:  Negative for confusion and sleep disturbance.         Objective   Vital Signs:   /84   Ht 165.1 cm (65\")   Wt 82.1 kg (181 lb)   BMI 30.12 kg/m²     Physical Exam  Vitals and nursing note reviewed. Exam conducted with a chaperone present.   Constitutional:       General: She is not in acute distress.     Appearance: She is well-developed. She is not diaphoretic.   HENT:      Head: Normocephalic.      Right Ear: External ear normal.      Left Ear: External ear normal.      Nose: Nose normal.   Eyes:      General: No scleral icterus.        Right eye: No discharge.         Left eye: No discharge.      " Conjunctiva/sclera: Conjunctivae normal.      Pupils: Pupils are equal, round, and reactive to light.   Neck:      Thyroid: No thyromegaly.      Vascular: No carotid bruit.      Trachea: No tracheal deviation.   Cardiovascular:      Rate and Rhythm: Normal rate and regular rhythm.      Heart sounds: Normal heart sounds. No murmur heard.  Pulmonary:      Effort: Pulmonary effort is normal. No respiratory distress.      Breath sounds: Normal breath sounds. No wheezing.   Chest:   Breasts:     Breasts are symmetrical.      Right: Normal. No swelling, bleeding, inverted nipple, mass, nipple discharge, skin change or tenderness.      Left: Normal. No swelling, bleeding, inverted nipple, mass, nipple discharge, skin change or tenderness.   Abdominal:      General: There is no distension.      Palpations: Abdomen is soft. There is no mass.      Tenderness: There is no abdominal tenderness. There is no right CVA tenderness, left CVA tenderness or guarding.      Hernia: No hernia is present. There is no hernia in the left inguinal area or right inguinal area.   Genitourinary:     General: Normal vulva.      Exam position: Lithotomy position.      Labia:         Right: No rash, tenderness, lesion or injury.         Left: No rash, tenderness, lesion or injury.       Vagina: Normal. No signs of injury and foreign body. No vaginal discharge, erythema, tenderness or bleeding.      Cervix: Normal.      Uterus: Normal. Not enlarged, not fixed and not tender.       Adnexa: Right adnexa normal and left adnexa normal.        Right: No mass, tenderness or fullness.          Left: No mass, tenderness or fullness.        Rectum: Normal. No mass.      Comments:   BSU normal  Urethral meatus  Normal  Perineum  Normal  Musculoskeletal:         General: No tenderness. Normal range of motion.      Cervical back: Normal range of motion and neck supple.   Lymphadenopathy:      Head:      Right side of head: No submental, submandibular,  tonsillar, preauricular, posterior auricular or occipital adenopathy.      Left side of head: No submental, submandibular, tonsillar, preauricular, posterior auricular or occipital adenopathy.      Cervical: No cervical adenopathy.      Right cervical: No superficial, deep or posterior cervical adenopathy.     Left cervical: No superficial, deep or posterior cervical adenopathy.      Upper Body:      Right upper body: No supraclavicular, axillary or pectoral adenopathy.      Left upper body: No supraclavicular, axillary or pectoral adenopathy.      Lower Body: No right inguinal adenopathy. No left inguinal adenopathy.   Skin:     General: Skin is warm and dry.      Findings: No bruising, erythema or rash.   Neurological:      Mental Status: She is alert and oriented to person, place, and time.      Coordination: Coordination normal.   Psychiatric:         Mood and Affect: Mood normal.         Behavior: Behavior normal.         Thought Content: Thought content normal.         Judgment: Judgment normal.         Result Review :                     Assessment and Plan      Well woman GYN exam.   Pap smear not indicated per ASCCP guidelines.   Will have lab work at PCP.     Laboratory studies will be available early next week.  No Pap smear collected.    Obtained bacterial vaginal swab.  A pelvic and vaginal examination was performed.    Encouraged SBE, pt is aware how to do self breast exam and the importance of same.   Discussed weight management and importance of maintaining a healthy weight.   Discussed Vitamin D intake and the importance of adequate vitamin D for both Bone Health and a healthy immune system.    Discussed Daily exercise and the importance of same, in regards to a healthy heart as well as helping to maintain her weight and improving her mental health.     Discussed STD prevention and testing.   Pt declines Chlamydia/Gonorrhea/Trichomonas, RPR, Hep panel and HIV testing.     Mammogram will be scheduled  at [facility].     Pt followed by Dr. Hayden. Referral submitted for Dr. Cole for continued follow up.       Diagnoses and all orders for this visit:    1. Encounter for gynecological examination (Primary)    2. Encounter for screening mammogram for malignant neoplasm of breast  -     Mammo Screening Digital Tomosynthesis Bilateral With CAD; Future    3. Vaginal discharge  -     NuSwab BV & Candida - Swab, Vagina  -     Genital Mycoplasmas PENNY, Swab - Swab, Vagina    4. Bilateral breast cysts  -     Ambulatory Referral to General Surgery    5. History of breast biopsy  -     Ambulatory Referral to General Surgery                  Follow Up   Return for Annual physical.    Patient was given instructions and counseling regarding her condition or for health maintenance advice. Please see specific information pulled into the AVS if appropriate.     Transcribed from ambient dictation for VINAY Linda by Dariana Santos.  10/20/23   17:43 CDT    Patient or patient representative verbalized consent to the visit recording.  I have personally performed the services described in this document as transcribed by the above individual, and it is both accurate and complete.  VINAY Linda  10/23/2023  22:11 CDT

## 2023-10-24 NOTE — PATIENT INSTRUCTIONS

## 2023-10-25 LAB
A VAGINAE DNA VAG QL NAA+PROBE: NORMAL SCORE
BVAB2 DNA VAG QL NAA+PROBE: NORMAL SCORE
C ALBICANS DNA VAG QL NAA+PROBE: NEGATIVE
C GLABRATA DNA VAG QL NAA+PROBE: NEGATIVE
M GENITALIUM DNA SPEC QL NAA+PROBE: NEGATIVE
M HOMINIS DNA SPEC QL NAA+PROBE: NEGATIVE
MEGA1 DNA VAG QL NAA+PROBE: NORMAL SCORE
UREAPLASMA DNA SPEC QL NAA+PROBE: NEGATIVE

## 2023-10-31 ENCOUNTER — TELEPHONE (OUTPATIENT)
Dept: OBSTETRICS AND GYNECOLOGY | Facility: CLINIC | Age: 53
End: 2023-10-31

## 2023-10-31 NOTE — TELEPHONE ENCOUNTER
Caller: Eyal Bre D    Relationship: Self    Best call back number: [phone] CALL ANYTIME, IT IS OKAY TO LVM.    Requested Prescriptions:   Requested Prescriptions      No prescriptions requested or ordered in this encounter      ANTIBIOTIC TO TREAT VAGINAL DISCHARGE AND ODOR    Pharmacy where request should be sent: [facility] [address] - [phone] [facility] [phone] FX     Last office visit with prescribing clinician: 10/20/2023   Last telemedicine visit with prescribing clinician: Visit date not found   Next office visit with prescribing clinician: 10/22/2024     Additional details provided by patient: PATIENT CHECKED MYCHART LABS FROM 10/20/23 AND STATES ALL RESULTS CAME BACK NEGATIVE. PATIENT STILL HAS VAGINAL DISCHARGE AND ODOR. PATIENT IS REQUESTING ANTIBIOTIC.     Does the patient have less than a 3 day supply:  [] Yes  [] No    Would you like a call back once the refill request has been completed: [] Yes [x] No    If the office needs to give you a call back, can they leave a voicemail: [x] Yes [] No    Anny Roach Rep   10/31/23 09:07 CDT

## 2023-11-01 RX ORDER — METRONIDAZOLE 500 MG/1
500 TABLET ORAL 2 TIMES DAILY
Qty: 14 TABLET | Refills: 0 | Status: SHIPPED | OUTPATIENT
Start: 2023-11-01 | End: 2023-11-08

## 2023-11-03 LAB
NCCN CRITERIA FLAG: NORMAL
TYRER CUZICK SCORE: 13.9

## 2023-11-04 ENCOUNTER — HOSPITAL ENCOUNTER (OUTPATIENT)
Dept: MAMMOGRAPHY | Facility: HOSPITAL | Age: 53
Discharge: HOME | End: 2023-11-04
Admitting: NURSE PRACTITIONER
Payer: COMMERCIAL

## 2023-11-04 DIAGNOSIS — Z12.31 ENCOUNTER FOR SCREENING MAMMOGRAM FOR MALIGNANT NEOPLASM OF BREAST: ICD-10-CM

## 2023-11-04 PROCEDURE — 77063 BREAST TOMOSYNTHESIS BI: CPT

## 2023-11-04 PROCEDURE — 77067 SCR MAMMO BI INCL CAD: CPT

## 2023-11-16 ENCOUNTER — OFFICE VISIT (OUTPATIENT)
Dept: SURGERY | Facility: CLINIC | Age: 53
End: 2023-11-16
Payer: COMMERCIAL

## 2023-11-16 VITALS
DIASTOLIC BLOOD PRESSURE: 90 MMHG | OXYGEN SATURATION: 96 % | HEART RATE: 94 BPM | WEIGHT: 178 LBS | HEIGHT: 65 IN | BODY MASS INDEX: 29.66 KG/M2 | SYSTOLIC BLOOD PRESSURE: 135 MMHG

## 2023-11-16 DIAGNOSIS — N60.12 FIBROCYSTIC DISEASE OF BOTH BREASTS: Primary | ICD-10-CM

## 2023-11-16 DIAGNOSIS — N60.11 FIBROCYSTIC DISEASE OF BOTH BREASTS: Primary | ICD-10-CM

## 2023-11-16 DIAGNOSIS — E66.3 OVERWEIGHT (BMI 25.0-29.9): ICD-10-CM

## 2023-11-16 NOTE — PROGRESS NOTES
Office New Patient History and Physical:     Referring Provider: Rosanne Cardoza A*    No chief complaint on file.       Subjective .     History of present illness:  Bre Birch is a 53 y.o. female with a history of bilateral breast cysts s/p multiple aspirations and excisional biopsies by Dr. Hayden.     Breast History information:   Prior abnormal mammograms: yes, multiple   Prior breast biopsies: multiple, bilateral. All benign.   Palpable breast masses: none   Nipple discharge: none   Age at first menses: 16  Age at menopause: marlin-menopausal   Number of biological children: 2  Age at first birth: 27  Years on birth control: 4 years   Years on HRT: none   Family history of breast cancer: none   Smoking History: none   Alcohol use: none   BMI: 29    She is not on blood thinners.      History  Past Medical History:   Diagnosis Date    Anxiety     Hyperlipidemia     Hypothyroid     Obstructive sleep apnea    ,   Past Surgical History:   Procedure Laterality Date    BREAST BIOPSY Left 2 yrs ago    CHOLECYSTECTOMY      COLONOSCOPY      ENDOMETRIAL ABLATION      ENDOSCOPY      FOOT ARTHRODESIS, MODIFIED DOUGLASS      HERNIA REPAIR      TUBAL ABDOMINAL LIGATION     ,   Family History   Problem Relation Age of Onset    Hypertension Father     Stroke Mother     Hypertension Brother     No Known Problems Daughter     No Known Problems Paternal Grandmother     No Known Problems Maternal Grandmother     Cancer Maternal Grandfather     No Known Problems Maternal Aunt     No Known Problems Paternal Aunt     BRCA 1/2 Neg Hx     Breast cancer Neg Hx     Colon cancer Neg Hx     Endometrial cancer Neg Hx     Ovarian cancer Neg Hx    ,   Social History     Tobacco Use    Smoking status: Never    Smokeless tobacco: Never   Vaping Use    Vaping Use: Never used   Substance Use Topics    Alcohol use: No    Drug use: No   , (Not in a hospital admission)   and Allergies:  Bactrim [sulfamethoxazole-trimethoprim], Azithromycin,  "and Macrolides and ketolides    Current Outpatient Medications:     cholecalciferol (VITAMIN D3) 1000 units tablet, Take 1 tablet by mouth Daily., Disp: , Rfl:     desvenlafaxine (PRISTIQ) 50 MG 24 hr tablet, Take 1 tablet by mouth Daily. 2 tablets, Disp: , Rfl:     dexlansoprazole (DEXILANT) 60 MG capsule, , Disp: , Rfl:     fexofenadine (Allegra Allergy) 180 MG tablet, Take 1 tablet by mouth Daily., Disp: 30 tablet, Rfl: 0    levothyroxine (SYNTHROID, LEVOTHROID) 75 MCG tablet, Take 1 tablet by mouth Daily., Disp: , Rfl:     Omega-3 Fatty Acids (FISH OIL) 1000 MG capsule capsule, Take 2 capsules by mouth Daily With Breakfast., Disp: , Rfl:     Ozempic, 0.25 or 0.5 MG/DOSE, 2 MG/1.5ML solution pen-injector, , Disp: , Rfl:     pravastatin (PRAVACHOL) 40 MG tablet, , Disp: , Rfl:     Objective     Vital Signs   /90 (BP Location: Left arm, Patient Position: Sitting, Cuff Size: Adult)   Pulse 94   Ht 165.1 cm (65\")   Wt 80.7 kg (178 lb)   SpO2 96%   BMI 29.62 kg/m²      Physical Exam:  General appearance - alert, well appearing, and in no distress  Mental status - alert, oriented to person, place, and time  Eyes - pupils equal and reactive, extraocular eye movements intact  Neck - supple, no significant adenopathy  Chest - no tachypnea, retractions or cyanosis  Heart - normal rate and regular rhythm  Abdomen - soft, nontender, nondistended, no masses or organomegaly  Neurological - alert, oriented, normal speech, no focal findings or movement disorder noted  Breast Exam: Bilateral breasts without obvious deformities. Bilateral nipples everted. Patient examined in the supine position with the ipsilateral arm above the head. Bilateral cysts. No concerning palpable masses bilaterally. No nipple discharge bilaterally. No palpable axillary nor supraclavicular adenopathy bilaterally.     Results Review:     The following data was reviewed by: Esthela Cole MD on 11/16/2023:    Mammo Screening Digital " Tomosynthesis Bilateral With CAD (11/04/2023 08:50)   MPRESSION AND RECOMMENDATION:   No mammographic evidence of malignancy. Recommendation is for the  patient to return for routine mammography in one year or sooner, if  clinically indicated.      BIRADS Category 2 - Benign findings    Assessment & Plan       Diagnoses and all orders for this visit:    1. Fibrocystic disease of both breasts (Primary)    2. Overweight (BMI 25.0-29.9)         Bre Birch is a 53 y.o. female with chronic bilateral breast cysts and a history of multiple aspirations as well as excisions. Mammogram and exam benign. She has no current complaints. If she develops a cyst or concern, she will call for an appointment.     This is a chronic problem. I have reviewed the mammogram above.             Esthela Cole MD  11/19/23  16:10 CST

## 2023-11-19 NOTE — PATIENT INSTRUCTIONS

## 2023-12-27 ENCOUNTER — OFFICE VISIT (OUTPATIENT)
Dept: OBSTETRICS AND GYNECOLOGY | Facility: CLINIC | Age: 53
End: 2023-12-27
Payer: COMMERCIAL

## 2023-12-27 VITALS
HEIGHT: 65 IN | BODY MASS INDEX: 28.66 KG/M2 | WEIGHT: 172 LBS | SYSTOLIC BLOOD PRESSURE: 104 MMHG | DIASTOLIC BLOOD PRESSURE: 72 MMHG

## 2023-12-27 DIAGNOSIS — E66.3 OVERWEIGHT WITH BODY MASS INDEX (BMI) OF 28 TO 28.9 IN ADULT: ICD-10-CM

## 2023-12-27 DIAGNOSIS — R39.9 UTI SYMPTOMS: Primary | ICD-10-CM

## 2023-12-27 DIAGNOSIS — Z78.9 NON-SMOKER: ICD-10-CM

## 2023-12-27 DIAGNOSIS — N89.8 VAGINAL DISCHARGE: ICD-10-CM

## 2023-12-27 DIAGNOSIS — R35.0 URINARY FREQUENCY: ICD-10-CM

## 2023-12-27 LAB
BILIRUB BLD-MCNC: NEGATIVE MG/DL
CLARITY, POC: CLEAR
COLOR UR: YELLOW
GLUCOSE UR STRIP-MCNC: NEGATIVE MG/DL
KETONES UR QL: ABNORMAL
LEUKOCYTE EST, POC: NEGATIVE
NITRITE UR-MCNC: NEGATIVE MG/ML
PH UR: 6.5 [PH] (ref 5–8)
PROT UR STRIP-MCNC: ABNORMAL MG/DL
RBC # UR STRIP: NEGATIVE /UL
SP GR UR: 1.01 (ref 1–1.03)
UROBILINOGEN UR QL: NORMAL

## 2023-12-27 RX ORDER — AMOXICILLIN 875 MG/1
875 TABLET, COATED ORAL 2 TIMES DAILY
Qty: 14 TABLET | Refills: 0 | Status: SHIPPED | OUTPATIENT
Start: 2023-12-27

## 2023-12-27 RX ORDER — DESVENLAFAXINE 100 MG/1
TABLET, EXTENDED RELEASE ORAL
COMMUNITY
Start: 2023-11-01

## 2023-12-27 NOTE — PROGRESS NOTES
"Chief Complaint  Urinary Tract Infection (Pt is here with c/o a possible UTI.  Having urinary frequency but not voiding much, having pressure as well.  Also feels she has a smell with discharge.  )    Subjective          Bre Birch presents to [facility]  History of Present Illness    The patient is a 53-year-old female who presents for evaluation of multiple medical concerns.    The patient frequently feels like she has to urinate, but then she is unable to urinate.   She is questioning if she may have bladder prolapse.   The medication she was given in 10/2023 resolved her symptoms for a while, but then her symptoms slowly returned.   When she wipes after urination, she does not \"feel clean\" as there is a small amount of discharge.   The patient had a yeast infection in 10/2023 for the first time in her life.   She does not drink a significant amount of Coca-Cola, but she does drink \"loaded teas.\"    Review of Systems   Genitourinary:  Positive for frequency.     UTI  Vaginal discharge      Objective   Vital Signs:   /72   Ht 165.1 cm (65\")   Wt 78 kg (172 lb)   BMI 28.62 kg/m²     Physical Exam  Exam conducted with a chaperone present.   Constitutional:       Appearance: She is well-developed.   HENT:      Head: Normocephalic.   Neck:      Trachea: No tracheal deviation.   Cardiovascular:      Rate and Rhythm: Normal rate.   Pulmonary:      Breath sounds: Normal breath sounds. No wheezing.   Abdominal:      Palpations: Abdomen is soft.      Tenderness: There is no abdominal tenderness.   Genitourinary:     Exam position: Lithotomy position.      Labia:         Right: No rash, tenderness or lesion.         Left: No rash, tenderness or lesion.       Vagina: Vaginal discharge (scant) present. No erythema or tenderness.      Cervix: No cervical motion tenderness or discharge.      Uterus: Not deviated, not enlarged and not tender.       Adnexa:         Right: No mass, tenderness " or fullness.          Left: No mass, tenderness or fullness.        Rectum: Normal.   Skin:     General: Skin is warm and dry.      Findings: No rash.   Neurological:      Mental Status: She is alert and oriented to person, place, and time.   Psychiatric:         Speech: Speech normal.         Behavior: Behavior normal.         Result Review :                       Assessment and Plan          Urinary tract infection.  I do not notice any type of prolapse at this point. I will send her urine for culture. She will be treated with amoxicillin. She was advised to decrease sodas, coffee, and tea.     Vaginal discharge.  Specimen collected for Mycoplasma and BV panel.       Diagnoses and all orders for this visit:    1. UTI symptoms (Primary)    2. Urinary frequency  -     POC Urinalysis Dipstick  -     Urine Culture - Urine, Urine, Random Void    3. Vaginal discharge  -     NuSwab BV & Candida - Swab, Vagina  -     Genital Mycoplasmas PENNY, Swab - Swab, Vagina    4. Overweight with body mass index (BMI) of 28 to 28.9 in adult    5. Non-smoker    Other orders  -     amoxicillin (AMOXIL) 875 MG tablet; Take 1 tablet by mouth 2 (Two) Times a Day.  Dispense: 14 tablet; Refill: 0                  Follow Up   Return for Annual physical.    Patient was given instructions and counseling regarding her condition or for health maintenance advice. Please see specific information pulled into the AVS if appropriate.     Transcribed from ambient dictation for VINAY Linda by Pearl Gomes.  12/27/23   12:14 CST    Patient or patient representative verbalized consent to the visit recording.  I have personally performed the services described in this document as transcribed by the above individual, and it is both accurate and complete.  VINAY Linda  1/2/2024  21:30 CST

## 2023-12-30 LAB
A VAGINAE DNA VAG QL NAA+PROBE: NORMAL SCORE
BACTERIA UR CULT: NORMAL
BACTERIA UR CULT: NORMAL
BVAB2 DNA VAG QL NAA+PROBE: NORMAL SCORE
C ALBICANS DNA VAG QL NAA+PROBE: NEGATIVE
C GLABRATA DNA VAG QL NAA+PROBE: NEGATIVE
M GENITALIUM DNA SPEC QL NAA+PROBE: NEGATIVE
M HOMINIS DNA SPEC QL NAA+PROBE: NEGATIVE
MEGA1 DNA VAG QL NAA+PROBE: NORMAL SCORE
UREAPLASMA DNA SPEC QL NAA+PROBE: NEGATIVE

## 2024-01-03 NOTE — PATIENT INSTRUCTIONS

## 2024-01-31 ENCOUNTER — OFFICE VISIT (OUTPATIENT)
Dept: GASTROENTEROLOGY | Age: 54
End: 2024-01-31
Payer: COMMERCIAL

## 2024-01-31 VITALS
HEIGHT: 65 IN | SYSTOLIC BLOOD PRESSURE: 122 MMHG | OXYGEN SATURATION: 99 % | HEART RATE: 102 BPM | WEIGHT: 180 LBS | BODY MASS INDEX: 29.99 KG/M2 | DIASTOLIC BLOOD PRESSURE: 82 MMHG

## 2024-01-31 DIAGNOSIS — R13.10 DYSPHAGIA, UNSPECIFIED TYPE: Primary | ICD-10-CM

## 2024-01-31 DIAGNOSIS — K21.9 GASTROESOPHAGEAL REFLUX DISEASE, UNSPECIFIED WHETHER ESOPHAGITIS PRESENT: ICD-10-CM

## 2024-01-31 PROCEDURE — 99204 OFFICE O/P NEW MOD 45 MIN: CPT | Performed by: NURSE PRACTITIONER

## 2024-01-31 NOTE — PROGRESS NOTES
Subjective:     Patient ID: Elvia Martel is a 53 y.o. female  PCP: Babar Gamez MD  Referring Provider: No ref. provider found    HPI  Patient presents to the office today with the following complaints: Gastroesophageal Reflux      Patient seen in the office today with complaints of difficulty swallowing, she feels like food is getting stuck   Reports she does have chronic acid reflux   She is taking omeprazole 20 mg BID and this controls her acid reflux fairly well     Reports she is having no lower GI issues  Her colonoscopy is up to date  with a 10 year recall due 2029    Assessment:     1. Dysphagia, unspecified type  2. Gastroesophageal reflux disease, unspecified whether esophagitis present           Plan:   Miralax   Schedule EGD  Nothing to eat or drink after midnight.  No driving for 24 hours after procedure. Bring a  to procedure.  No aspirin, NSAIDs, fish oil 5 days before procedure.  I have discussed the benefits, alternatives, and risks (including bleeding, perforation and death)  for pursuing Endoscopy (EGD/Colonscopy/EUS/ERCP) with the patient and they are willing to continue. We also discussed the need for anesthesia, IV access, proper dietary changes, medication changes if necessary, and need for bowel prep (if ordered) prior to their Endoscopic procedure.  They are aware they must have someone accompany them to their scheduled procedure to drive them home - they agree to the above and are willing to continue.     Orders  No orders of the defined types were placed in this encounter.    Medications  No orders of the defined types were placed in this encounter.        Patient History:     Past Medical History:   Diagnosis Date    Anxiety     CPAP (continuous positive airway pressure) dependence     8cm    GERD (gastroesophageal reflux disease)     Hypothyroidism     Obstructive sleep apnea     AHI: 15.5 per PSG, 8/2019    Prolonged emergence from general anesthesia        Past Surgical

## 2024-01-31 NOTE — PATIENT INSTRUCTIONS
Upper GI Endoscopy: Before Your Procedure    What is an upper GI endoscopy?  An upper gastrointestinal (or GI) endoscopy is a test that allows your doctor to look at the inside of your esophagus, stomach, and the first part of your small intestine, called the duodenum. The esophagus is the tube that carries food to your stomach. The doctor uses a thin, lighted tube that bends. It is called an endoscope, or scope.  The doctor puts the tip of the scope in your mouth and gently moves it down your throat. The scope is a flexible video camera. The doctor looks at a monitor (like a TV set or a computer screen) as he or she moves the scope. A doctor may do this procedure to look for ulcers, tumors, infection, or bleeding. It also can be used to look for signs of acid backing up into your esophagus. This is called gastroesophageal reflux disease, or GERD. The doctor can use the scope to take a sample of tissue for study (a biopsy). The doctor also can use the scope to take out growths or stop bleeding.  How do you prepare for the procedure?  Procedures can be stressful. This information will help you understand what you can expect. And it will help you safely prepare for your procedure.  Preparing for the procedure    Do not eat or drink anything for 6 to 8 hours before the test. An empty stomach helps your doctor see your stomach clearly during the test. It also reduces your chances of vomiting. If you vomit, there is a small risk that the vomit could enter your lungs. (This is called aspiration.) If the test is done in an emergency, a tube may be inserted through your nose or mouth to empty your stomach.     Do not take sucralfate (Carafate) or antacids on the day of the test. These medicines can make it hard for your doctor to see your upper GI tract.     If your doctor tells you to, stop taking iron supplements 7 to 14 days before the test.     Be sure you have someone to take you home. Anesthesia and pain medicine will

## 2024-02-01 ENCOUNTER — ANESTHESIA EVENT (OUTPATIENT)
Dept: OPERATING ROOM | Age: 54
End: 2024-02-01

## 2024-02-06 ENCOUNTER — TELEPHONE (OUTPATIENT)
Dept: GASTROENTEROLOGY | Age: 54
End: 2024-02-06

## 2024-02-06 NOTE — ANESTHESIA PRE PROCEDURE
Department of Anesthesiology  Preprocedure Note       Name:  Elvia Martel   Age:  53 y.o.  :  1970                                          MRN:  [id number]         Date:  2024      Surgeon: Surgeon(s):  Shin Cedeno MD    Procedure: Procedure(s):  EGD BIOPSY    Medications prior to admission:   Prior to Admission medications    Medication Sig Start Date End Date Taking? Authorizing Provider   levothyroxine (SYNTHROID) 75 MCG tablet Daily  20   Catrina Mix MD   Omega-3 Fatty Acids (FISH OIL) 1200 MG CAPS Take by mouth daily     aCtrina Mix MD   desvenlafaxine succinate (PRISTIQ) 25 MG TB24 extended release tablet Take 1 tablet by mouth daily    Catrina Mix MD   fexofenadine (ALLEGRA) 180 MG tablet Take 1 tablet by mouth daily    Catrina Mix MD       Current medications:    No current facility-administered medications for this encounter.     Current Outpatient Medications   Medication Sig Dispense Refill   • levothyroxine (SYNTHROID) 75 MCG tablet Daily      • Omega-3 Fatty Acids (FISH OIL) 1200 MG CAPS Take by mouth daily      • desvenlafaxine succinate (PRISTIQ) 25 MG TB24 extended release tablet Take 1 tablet by mouth daily     • fexofenadine (ALLEGRA) 180 MG tablet Take 1 tablet by mouth daily         Allergies:    Allergies   Allergen Reactions   • Sulfamethoxazole-Trimethoprim Anaphylaxis   • Azithromycin Hives       Problem List:    Patient Active Problem List   Diagnosis Code   • Chronic diarrhea K52.9   • S/P cholecystectomy Z90.49   • Dysphagia R13.10   • History of colonic diverticulitis Z87.19   • Current use of proton pump inhibitor Z79.899   • Hiatal hernia K44.9   • Diarrhea R19.7   • Obstructive sleep apnea G47.33   • CPAP (continuous positive airway pressure) dependence Z99.89   • Somnolence, daytime R40.0       Past Medical History:        Diagnosis Date   • Anxiety    • CPAP (continuous positive airway pressure) dependence     8cm   • GERD

## 2024-02-06 NOTE — TELEPHONE ENCOUNTER
Called patient to remind them of their procedure with Dr. Shin Cedeno  at [facility]  on 2/8/24 to arrive at 630AM     CONFIRMED

## 2024-02-08 ENCOUNTER — HOSPITAL ENCOUNTER (OUTPATIENT)
Age: 54
Setting detail: OUTPATIENT SURGERY
Discharge: HOME | End: 2024-02-08
Attending: INTERNAL MEDICINE | Admitting: INTERNAL MEDICINE

## 2024-02-08 ENCOUNTER — ANESTHESIA (OUTPATIENT)
Dept: OPERATING ROOM | Age: 54
End: 2024-02-08

## 2024-02-08 ENCOUNTER — APPOINTMENT (OUTPATIENT)
Dept: OPERATING ROOM | Age: 54
End: 2024-02-08
Attending: INTERNAL MEDICINE

## 2024-02-08 VITALS
HEIGHT: 65 IN | BODY MASS INDEX: 29.99 KG/M2 | OXYGEN SATURATION: 98 % | TEMPERATURE: 97.1 F | SYSTOLIC BLOOD PRESSURE: 143 MMHG | HEART RATE: 86 BPM | RESPIRATION RATE: 16 BRPM | WEIGHT: 180 LBS | DIASTOLIC BLOOD PRESSURE: 73 MMHG

## 2024-02-08 LAB
CONTROL: NORMAL
PREGNANCY TEST URINE, POC: NORMAL

## 2024-02-08 PROCEDURE — 43248 EGD GUIDE WIRE INSERTION: CPT

## 2024-02-08 RX ORDER — LIDOCAINE HYDROCHLORIDE 10 MG/ML
INJECTION, SOLUTION INFILTRATION; PERINEURAL PRN
Status: DISCONTINUED | OUTPATIENT
Start: 2024-02-08 | End: 2024-02-08 | Stop reason: SDUPTHER

## 2024-02-08 RX ORDER — PROPOFOL 10 MG/ML
INJECTION, EMULSION INTRAVENOUS PRN
Status: DISCONTINUED | OUTPATIENT
Start: 2024-02-08 | End: 2024-02-08 | Stop reason: SDUPTHER

## 2024-02-08 RX ORDER — SODIUM CHLORIDE, SODIUM LACTATE, POTASSIUM CHLORIDE, CALCIUM CHLORIDE 600; 310; 30; 20 MG/100ML; MG/100ML; MG/100ML; MG/100ML
INJECTION, SOLUTION INTRAVENOUS CONTINUOUS
Status: DISCONTINUED | OUTPATIENT
Start: 2024-02-08 | End: 2024-02-08 | Stop reason: HOSPADM

## 2024-02-08 RX ORDER — OMEPRAZOLE 40 MG/1
40 CAPSULE, DELAYED RELEASE ORAL
Qty: 30 CAPSULE | Refills: 3 | Status: SHIPPED | OUTPATIENT
Start: 2024-02-08

## 2024-02-08 RX ADMIN — PROPOFOL 150 MG: 10 INJECTION, EMULSION INTRAVENOUS at 08:04

## 2024-02-08 RX ADMIN — SODIUM CHLORIDE, SODIUM LACTATE, POTASSIUM CHLORIDE, CALCIUM CHLORIDE: 600; 310; 30; 20 INJECTION, SOLUTION INTRAVENOUS at 06:55

## 2024-02-08 RX ADMIN — LIDOCAINE HYDROCHLORIDE 50 MG: 10 INJECTION, SOLUTION INFILTRATION; PERINEURAL at 08:04

## 2024-02-08 NOTE — ANESTHESIA POSTPROCEDURE EVALUATION
Department of Anesthesiology  Postprocedure Note    Patient: Elvia Martel  MRN: [id number]  YOB: 1970  Date of evaluation: 2/8/2024    Procedure Summary     Date: 02/08/24 Room / Location: [place] [address] / [facility]    Anesthesia Start: 0803 Anesthesia Stop: 0811    Procedure: EGD DILATION SAVORY 54 FR (Esophagus) Diagnosis:       Dysphagia, unspecified type      Gastroesophageal reflux disease, unspecified whether esophagitis present      (Dysphagia, unspecified type [R13.10])      (Gastroesophageal reflux disease, unspecified whether esophagitis present [K21.9])    Surgeons: Shin Cedeno MD Responsible Provider: Lazaro Anand APRN - CRNA    Anesthesia Type: general, TIVA ASA Status: 2          Anesthesia Type: No value filed.    Joon Phase I:      Joon Phase II:      Anesthesia Post Evaluation    Patient location during evaluation: bedside  Patient participation: complete - patient cannot participate  Level of consciousness: responsive to verbal stimuli  Pain score: 0  Airway patency: patent  Nausea & Vomiting: no nausea and no vomiting  Cardiovascular status: hemodynamically stable and blood pressure returned to baseline  Respiratory status: acceptable, spontaneous ventilation and room air  Hydration status: euvolemic        No notable events documented.

## 2024-02-08 NOTE — OP NOTE
Endoscopic Procedure Note    Patient: Elvia Martel : 1970  Med Rec#: [id number] Acc#: [id number]     Primary Care Provider Babar Gamez MD  Referring Provider: LENORA Thapa    Endoscopist: Shin Cedeno MD    Date of Procedure:  2024    Procedure:   1. EGD with wire-guided dilation- 54 F    Indications:   1. Reflux   2. Dysphagia     Anesthesia:  Sedation was administered by anesthesia who monitored the patient during the procedure.    Estimated Blood Loss: minimal    Procedure:   After reviewing the patient's chart and obtaining informed consent, the patient was placed in the left lateral decubitus position.  A forward-viewing Olympus endoscope was lubricated and inserted through the mouth into the oropharynx. Under direct visualization, the upper esophagus was intubated. The scope was advanced to the level of the third portion of duodenum. Scope was slowly withdrawn with careful inspection of the mucosal surfaces. The scope was retroflexed for inspection of the gastric fundus and incisura. Findings and maneuvers are listed in impression below. The patient tolerated the procedure well. The scope was removed. There were no immediate complications.    Findings:   Esophagus: abnormal: there is a questionable benign appearing stricture in the distal esophagus, dilated due to symptoms.  A guidewire was placed through the endoscope and the endoscope was removed.  A 54 French savory dilator was advanced down the length of the esophagus using a fixed-point wire technique. The dilator and guidewire were removed.  The patient tolerated the procedure well.     There is a 5 cm hiatal hernia present.      Stomach:  normal        Duodenum: normal      IMPRESSION:  1. S/p wire guided dilation- 54 F     RECOMMENDATIONS:    1.  Await path results  2.  Start PPI (prilosec) daily  3.  Repeat dilation as needed.    The results were discussed with the patient and family.  A copy of the images obtained

## 2024-02-08 NOTE — DISCHARGE INSTRUCTIONS
POST-OP ORDERS: ENDOSCOPY & COLONOSCOPY:    1. Rest today.    2. DO NOT eat or drink until wide awake; eat your usual diet today in moderate amount only.    3. DO NOT drive today.    4. Call physician if you have severe pain, vomiting, fever, rectal bleeding or black bowel movements.    5.  If a biopsy was taken or a polyp removed, you should expect to hear results in about 21 days.  If you have heard nothing from your physician by then, call the office for results.    6.  Discharge home when patient awake, vitals signs stable and tolerating liquids.    7. Call with questions or concerns [phone].     RECOMMENDATIONS:    1.  Await path results  2.  Start PPI (prilosec) daily  3.  Repeat dilation as needed.

## 2024-02-08 NOTE — H&P
Patient Name: Elvia Martel  : 1970  MRN: [id number]  DATE: 24    Allergies:   Allergies   Allergen Reactions    Sulfamethoxazole-Trimethoprim Anaphylaxis    Azithromycin Hives        ENDOSCOPY  History and Physical    Procedure:    [] Diagnostic Colonoscopy       [] Screening Colonoscopy  [x] EGD      [] ERCP      [] EUS       [] Other    [x] Previous office notes/History and Physical reviewed from the patients chart. Please see EMR for further details of HPI. I have examined the patient's status immediately prior to the procedure and:      Indications/HPI:    []Abdominal Pain   []Barretts  []Screening/Surveillance   []History of Polyps  [x]Dysphagia            [] +Cologard/DNA testing  []Abnormal Imaging              []EOE Hx              [] Family Hx of CRC/Polyps  []Anemia                            []Food Impaction       []Recent Poor Prep  []GI Bleed             []Lymphadenopathy  []History of Polyps  []Change in bowel habits [x]Heartburn/Reflux  []Cancer- GI/Lung  []Chest Pain - Non Cardiac []Heme (+) Stool []Ulcers  []Constipation  []Hemoptysis  []Incontinence    []Diarrhea  []Hypoxemia  []Rectal Bleed (BRBPR)  []Nausea/Vomiting   [] Varices  []Crohns/Colitis  []Pancreatic Cyst   [] Cirrhosis   []Pancreatitis    []Abnormal MRCP  []Elevated LFT [] Stent Removal, Previous ERCP  []Other:     Anesthesia:   [x] MAC [] Moderate Sedation   [] General   [] None     ROS: 12 pt Review of Symptoms was negative unless mentioned above    Medications:   Prior to Admission medications    Medication Sig Start Date End Date Taking? Authorizing Provider   levothyroxine (SYNTHROID) 75 MCG tablet Daily  20   Catrina Mix MD   Omega-3 Fatty Acids (FISH OIL) 1200 MG CAPS Take by mouth daily     Catrina Mix MD   desvenlafaxine succinate (PRISTIQ) 25 MG TB24 extended release tablet Take 1 tablet by mouth daily    Catrina Mix MD   fexofenadine (ALLEGRA) 180 MG tablet Take 1 tablet

## 2024-02-23 ENCOUNTER — OFFICE VISIT (OUTPATIENT)
Dept: GASTROENTEROLOGY | Age: 54
End: 2024-02-23
Payer: COMMERCIAL

## 2024-02-23 VITALS
OXYGEN SATURATION: 97 % | BODY MASS INDEX: 31.16 KG/M2 | SYSTOLIC BLOOD PRESSURE: 134 MMHG | HEIGHT: 65 IN | HEART RATE: 90 BPM | DIASTOLIC BLOOD PRESSURE: 84 MMHG | WEIGHT: 187 LBS

## 2024-02-23 DIAGNOSIS — K44.9 HIATAL HERNIA: Primary | ICD-10-CM

## 2024-02-23 DIAGNOSIS — K21.9 GASTROESOPHAGEAL REFLUX DISEASE, UNSPECIFIED WHETHER ESOPHAGITIS PRESENT: ICD-10-CM

## 2024-02-23 DIAGNOSIS — R10.13 EPIGASTRIC PRESSURE: ICD-10-CM

## 2024-02-23 PROCEDURE — 99214 OFFICE O/P EST MOD 30 MIN: CPT | Performed by: NURSE PRACTITIONER

## 2024-02-23 NOTE — PROGRESS NOTES
Referral Reason:   Specialty Services Required     Requested Specialty:   Bariatrics     Number of Visits Requested:   1     Medications  No orders of the defined types were placed in this encounter.        Patient History:     Past Medical History:   Diagnosis Date    Anxiety     CPAP (continuous positive airway pressure) dependence     8cm    GERD (gastroesophageal reflux disease)     Hypothyroidism     Obstructive sleep apnea     AHI: 15.5 per [facility], 8/2019    Prolonged emergence from general anesthesia        Past Surgical History:   Procedure Laterality Date    BREAST SURGERY Left 09/09/2015    Dr Long-cyst removed    BREAST SURGERY Bilateral 03/30/2015    Dr Long    BREAST SURGERY Bilateral 08/28/2014    Dr Long    CHOLECYSTECTOMY  02/12/2016    Dr Long-Mild chronic cholecystitis    COLONOSCOPY N/A 03/26/2019    Dr CAMILA Cedeno-Diverticular disease-BCM    EGD  1990    Dr. Smith    FOOT SURGERY      HERNIA REPAIR      [place] EGD TRANSORAL BIOPSY SINGLE/MULTIPLE N/A 11/13/2018    Dr CAMILA Cedeno-w/dilation over wire-48 [place]-hiatal hernia, esophageal stricture-Gastritis    SKIN BIOPSY Right     leg- birth jameel removed    UPPER GASTROINTESTINAL ENDOSCOPY  11/13/2018    Dr CAMILA Cedeno-theodora/dilation over wire-48 [place]-hiatal hernia, esophageal stricture-Gastritis    UPPER GASTROINTESTINAL ENDOSCOPY N/A 02/08/2024    Dr CAMILA Cedeno-w/[id number] benign appearing stricture in the distal esophagus, HH       Family History   Problem Relation Age of Onset    Hypertension Father     Colon Cancer Maternal Grandfather         unknown age    Colon Polyps Neg Hx     Esophageal Cancer Neg Hx     Liver Cancer Neg Hx     Liver Disease Neg Hx     Rectal Cancer Neg Hx     Stomach Cancer Neg Hx        Social History     Socioeconomic History    Marital status:    Tobacco Use    Smoking status: Never    Smokeless tobacco: Never   Vaping Use    Vaping Use: Never used   Substance and Sexual Activity    Alcohol use: No    Drug use:

## 2024-02-29 ASSESSMENT — ENCOUNTER SYMPTOMS
VOMITING: 0
BLOOD IN STOOL: 0
NAUSEA: 0
ABDOMINAL PAIN: 1
ANAL BLEEDING: 0
RECTAL PAIN: 0
ABDOMINAL DISTENTION: 0
SHORTNESS OF BREATH: 0
CHOKING: 0
DIARRHEA: 0
CONSTIPATION: 0
TROUBLE SWALLOWING: 0
COUGH: 0

## 2024-03-04 ENCOUNTER — TELEPHONE (OUTPATIENT)
Dept: GASTROENTEROLOGY | Age: 54
End: 2024-03-04

## 2024-03-04 NOTE — TELEPHONE ENCOUNTER
03- [facility] requested refill on Omeprazole Dr Child     Routed to CT APRN       03- [facility] has requested a refill on Omeprazole Dr Child   Called patient she stated that she did want to use [facility]    Routed to Dr Cedeno

## 2024-03-04 NOTE — TELEPHONE ENCOUNTER
03- Patient called stated that she called Dr Steinberg's Office [facility] they did not get the referral.       Explained that CT's MA is out of the office but would fax over referral this am      Per OV 02-   Assessment:      1. Hiatal hernia  -     External Referral To Bariatrics  2. Gastroesophageal reflux disease, unspecified whether esophagitis present  3. Epigastric pressure                 Plan:   Referral to Dr. Steinberg for hiatal hernia repair         Faxed referral to Dr James Steinberg [facility]     Faxed [phone]

## 2024-03-08 RX ORDER — OMEPRAZOLE 40 MG/1
40 CAPSULE, DELAYED RELEASE ORAL
Qty: 90 CAPSULE | Refills: 3 | Status: SHIPPED | OUTPATIENT
Start: 2024-03-08

## 2024-03-12 ENCOUNTER — OFFICE VISIT (OUTPATIENT)
Dept: BARIATRICS/WEIGHT MGMT | Facility: CLINIC | Age: 54
End: 2024-03-12
Payer: COMMERCIAL

## 2024-03-12 VITALS
TEMPERATURE: 98.4 F | DIASTOLIC BLOOD PRESSURE: 85 MMHG | HEART RATE: 99 BPM | OXYGEN SATURATION: 95 % | WEIGHT: 191.2 LBS | SYSTOLIC BLOOD PRESSURE: 126 MMHG | BODY MASS INDEX: 31.86 KG/M2 | HEIGHT: 65 IN

## 2024-03-12 DIAGNOSIS — K21.9 GASTROESOPHAGEAL REFLUX DISEASE, UNSPECIFIED WHETHER ESOPHAGITIS PRESENT: Chronic | ICD-10-CM

## 2024-03-12 DIAGNOSIS — K44.9 HIATAL HERNIA: Primary | ICD-10-CM

## 2024-03-12 PROCEDURE — 99214 OFFICE O/P EST MOD 30 MIN: CPT | Performed by: SURGERY

## 2024-03-12 NOTE — PROGRESS NOTES
General Surgery   History and Physical     Referring Provider: Robert Linn A*    Patient Care Team:  Rik Berry MD as PCP - General  Rik Berry MD as PCP - Family Medicine  Antonio Lujan MD as Consulting Physician (Pulmonary Disease)  Jovana Hayden MD as Surgeon (General Surgery)  Kenia Aguirre APRN as Nurse Practitioner (Obstetrics and Gynecology)    Chief complaint: GERD and dysphagia    Subjective      History of present illness:  The patient is a 53 y.o. female presents to the office with complaints of reflux and dysphagia.  She stated she has been having the symptoms for several years however this is progressed to time.  She had a recent EGD which revealed a 5 cm hiatal hernia which she was explained might have caused the symptoms.  She subsequently had dilatation with minimal relief of her symptoms.  Since she still struggles with reflux and dysphagia with specific foods such as breads and chicken which she now avoids.  She has minimal relief with her antiacid medication.  She also notes that her symptoms have worsened with weight gain.    Review of Systems    Review of Systems - General ROS: positive for  - weight gain  ENT ROS: negative  Respiratory ROS: no cough, shortness of breath, or wheezing  Cardiovascular ROS: no chest pain or dyspnea on exertion  Gastrointestinal ROS: no abdominal pain, change in bowel habits, or black or bloody stools  positive for - heartburn and swallowing difficulty/pain    History  Past Medical History:   Diagnosis Date    Anxiety     Hyperlipidemia     Hypothyroid     Obstructive sleep apnea    ,   Past Surgical History:   Procedure Laterality Date    BREAST BIOPSY Left 2 yrs ago    CHOLECYSTECTOMY      COLONOSCOPY      ENDOMETRIAL ABLATION      ENDOSCOPY      FOOT ARTHRODESIS, MODIFIED DOUGLASS      HERNIA REPAIR      TUBAL ABDOMINAL LIGATION     ,   Family History   Problem Relation Age of Onset    Hypertension Father      Stroke Mother     Hypertension Brother     No Known Problems Daughter     No Known Problems Paternal Grandmother     No Known Problems Maternal Grandmother     Cancer Maternal Grandfather     No Known Problems Maternal Aunt     No Known Problems Paternal Aunt     BRCA 1/2 Neg Hx     Breast cancer Neg Hx     Colon cancer Neg Hx     Endometrial cancer Neg Hx     Ovarian cancer Neg Hx    ,   Social History     Tobacco Use    Smoking status: Never    Smokeless tobacco: Never   Vaping Use    Vaping status: Never Used   Substance Use Topics    Alcohol use: No    Drug use: No   , (Not in a hospital admission)   and Allergies:  Bactrim [sulfamethoxazole-trimethoprim], Azithromycin, and Macrolides and ketolides    Current Outpatient Medications:     cholecalciferol (VITAMIN D3) 1000 units tablet, Take 1 tablet by mouth Daily., Disp: , Rfl:     desvenlafaxine (PRISTIQ) 100 MG 24 hr tablet, , Disp: , Rfl:     fexofenadine (Allegra Allergy) 180 MG tablet, Take 1 tablet by mouth Daily., Disp: 30 tablet, Rfl: 0    levothyroxine (SYNTHROID, LEVOTHROID) 75 MCG tablet, Take 1 tablet by mouth Daily., Disp: , Rfl:     Omega-3 Fatty Acids (FISH OIL) 1000 MG capsule capsule, Take 2 capsules by mouth Daily With Breakfast., Disp: , Rfl:     OMEPRAZOLE PO, Take  by mouth., Disp: , Rfl:     pravastatin (PRAVACHOL) 40 MG tablet, , Disp: , Rfl:     amoxicillin (AMOXIL) 875 MG tablet, Take 1 tablet by mouth 2 (Two) Times a Day. (Patient not taking: Reported on 3/12/2024), Disp: 14 tablet, Rfl: 0    desvenlafaxine (PRISTIQ) 50 MG 24 hr tablet, Take 1 tablet by mouth Daily. 2 tablets (Patient not taking: Reported on 12/27/2023), Disp: , Rfl:     dexlansoprazole (DEXILANT) 60 MG capsule, , Disp: , Rfl:     Ozempic, 0.25 or 0.5 MG/DOSE, 2 MG/1.5ML solution pen-injector, , Disp: , Rfl:     Objective     Vital Signs   Temp:  [98.4 °F (36.9 °C)] 98.4 °F (36.9 °C)  Heart Rate:  [99] 99  BP: (126)/(85) 126/85    Physical Exam:  Physical  Exam  Constitutional:       Appearance: Normal appearance.   Cardiovascular:      Rate and Rhythm: Normal rate and regular rhythm.      Heart sounds: Normal heart sounds.   Pulmonary:      Effort: Pulmonary effort is normal.      Breath sounds: Normal breath sounds.   Abdominal:      General: Abdomen is flat. Bowel sounds are normal.      Tenderness: There is no abdominal tenderness.      Hernia: No hernia is present.          Comments: Previous laparoscopic cholecystectomy incisions with her periumbilical incision consistent with a periumbilical hernia repair.   Neurological:      Mental Status: She is alert.        Results Review:     Lab Results (last 24 hours)       ** No results found for the last 24 hours. **          Imaging Results (Last 24 Hours)       ** No results found for the last 24 hours. **            ASSESSMENT/PLAN   Diagnosis Plan   1. Hiatal hernia  Ambulatory Referral to Gastroenterology      2. Gastroesophageal reflux disease, unspecified whether esophagitis present  Ambulatory Referral to Gastroenterology         Plan:I believe this patient will be a good candidate for an antireflux procedure pending her manometry and pH studies.      She has chosen laparoscopic approach with robotic assist for a paraesophageal hernia repair and antireflux procedure. I agree with this decision.  I have discussed alternatives which include continued medical therapy, endoscopic approaches and behavioral changes provide the alternatives.  We discussed the benefits of the surgeries including the benefit improvement of GERD symptoms and injury to the distal esophagus due to reflux.  She is aware that the fundoplication procedure is difficult to reversible.  We discussed the complications and risks which include the risk of perforation, leakage,bleeding, intra-abdominal organ injury, specifically the esophagus and stomach as well as the risks of injuring the liver and spleen.  There is a risk of injury to the lungs  and death.  I explained to the patient that there is a risk of infection, bleeding as well as specifically relating to the antireflux procedure the risks of abdominal bloating, difficulty swallowing or belching, nausea and diarrhea.  I explained to her the possibility that this procedure may not be performed laparoscopically and may require being converted to an opened procedure or aborted due to abnormal anatomy.    There may also be associated risk of increased weight gain due to tolerating more foods that they were unable to tolerate prior to the procedure.    I explained to the patient that the success of the surgery is also related to the motivation and dedication to behavioral changes that they have learned during their postoperative course.     Irma Report   As part of this patient's treatment plan I am prescribing controlled substances.  We review Irma in our educational course.  The patient has been made aware of appropriate use of such medications, including potential risk of somnolence, limited ability to drive and /or work safely, and potential for dependence or overdose. It has also been made clear that these medications are for use by this patient only, without concomitant use of alcohol or other substances unless prescribed.    Bre Birch will be required to complete the educational preoperative class detailing terms of the preoperative and postoperative recommendations, risks of surgery, the monitoring of the patient's IRMA reports if necessary. Bre Birch is aware that inappropriate use of prescribed medications will result in cessation of prescribing such medications.    IRMA report has been reviewed.      History and physical exam exhibit continued safe and appropriate use of controlled substances.       Bre Birch understands the surgical procedures and the different surgical options that are available.   Bre Birch understands the lifestyle changes that are required after  surgery and has agreed to follow the guidelines outlined in the weight management program. Bre Birch also expressed understanding of the risks involved and had all of her questions answered and desires to proceed.    Upon completion of our discussion and addressing and answering her questions to her satisfaction, informed consent was obtained.   She will be scheduled accordingly for a laparoscopic paraesophageal hernia repair with antireflux procedure once completion of the manometry studies have been obtained and reviewed to determine candidacy.      Uli Marte MD  03/12/24  09:34 CDT

## 2024-04-22 ENCOUNTER — OFFICE VISIT (OUTPATIENT)
Dept: BARIATRICS/WEIGHT MGMT | Facility: CLINIC | Age: 54
End: 2024-04-22
Payer: COMMERCIAL

## 2024-04-22 ENCOUNTER — TELEPHONE (OUTPATIENT)
Dept: BARIATRICS/WEIGHT MGMT | Facility: CLINIC | Age: 54
End: 2024-04-22
Payer: COMMERCIAL

## 2024-04-22 VITALS
TEMPERATURE: 98.6 F | HEART RATE: 73 BPM | WEIGHT: 194 LBS | DIASTOLIC BLOOD PRESSURE: 83 MMHG | OXYGEN SATURATION: 97 % | SYSTOLIC BLOOD PRESSURE: 128 MMHG | HEIGHT: 65 IN | BODY MASS INDEX: 32.32 KG/M2

## 2024-04-22 DIAGNOSIS — K21.9 GASTROESOPHAGEAL REFLUX DISEASE, UNSPECIFIED WHETHER ESOPHAGITIS PRESENT: Chronic | ICD-10-CM

## 2024-04-22 DIAGNOSIS — K44.9 HIATAL HERNIA: Primary | ICD-10-CM

## 2024-04-22 RX ORDER — SODIUM CHLORIDE, SODIUM LACTATE, POTASSIUM CHLORIDE, CALCIUM CHLORIDE 600; 310; 30; 20 MG/100ML; MG/100ML; MG/100ML; MG/100ML
140 INJECTION, SOLUTION INTRAVENOUS CONTINUOUS
OUTPATIENT
Start: 2024-04-22

## 2024-04-22 NOTE — H&P (VIEW-ONLY)
General Surgery   History and Physical     Referring Provider: No ref. provider found    Patient Care Team:  Rik Berry MD as PCP - General  Rik Berry MD as PCP - Family Medicine  Antonio Lujan MD as Consulting Physician (Pulmonary Disease)  Jovana Hayden MD as Surgeon (General Surgery)  Kenia Aguirre APRN as Nurse Practitioner (Obstetrics and Gynecology)    Chief complaint: GERD with findings of a hiatal hernia    Subjective      History of present illness:  The patient is a 53 y.o. female presented with a history of GERD and findings of a hiatal hernia.  She has completed her manometry study which confirmed normal lower esophageal sphincter tone pressures.  She has been stable however does still consistently have symptoms of GERD.  She is here for follow-up and discussion for options and possible repair of her hiatal hernia.    Review of Systems    Review of Systems - General ROS: negative  ENT ROS: negative  Respiratory ROS: no cough, shortness of breath, or wheezing  Cardiovascular ROS: no chest pain or dyspnea on exertion  Gastrointestinal ROS: no abdominal pain, change in bowel habits, or black or bloody stools  positive for - heartburn  Genito-Urinary ROS: no dysuria, trouble voiding, or hematuria    History  Past Medical History:   Diagnosis Date    Anxiety     Hyperlipidemia     Hypothyroid     Obstructive sleep apnea    ,   Past Surgical History:   Procedure Laterality Date    BREAST BIOPSY Left 2 yrs ago    CHOLECYSTECTOMY      COLONOSCOPY      ENDOMETRIAL ABLATION      ENDOSCOPY      FOOT ARTHRODESIS, MODIFIED DOUGLASS      HERNIA REPAIR      TUBAL ABDOMINAL LIGATION     ,   Family History   Problem Relation Age of Onset    Hypertension Father     Stroke Mother     Hypertension Brother     No Known Problems Daughter     No Known Problems Paternal Grandmother     No Known Problems Maternal Grandmother     Cancer Maternal Grandfather     No Known Problems Maternal  Aunt     No Known Problems Paternal Aunt     BRCA 1/2 Neg Hx     Breast cancer Neg Hx     Colon cancer Neg Hx     Endometrial cancer Neg Hx     Ovarian cancer Neg Hx    ,   Social History     Tobacco Use    Smoking status: Never    Smokeless tobacco: Never   Vaping Use    Vaping status: Never Used   Substance Use Topics    Alcohol use: No    Drug use: No   , (Not in a hospital admission)   and Allergies:  Bactrim [sulfamethoxazole-trimethoprim], Azithromycin, and Macrolides and ketolides    Current Outpatient Medications:     cholecalciferol (VITAMIN D3) 1000 units tablet, Take 1 tablet by mouth Daily., Disp: , Rfl:     desvenlafaxine (PRISTIQ) 100 MG 24 hr tablet, , Disp: , Rfl:     fexofenadine (Allegra Allergy) 180 MG tablet, Take 1 tablet by mouth Daily., Disp: 30 tablet, Rfl: 0    levothyroxine (SYNTHROID, LEVOTHROID) 75 MCG tablet, Take 1 tablet by mouth Daily., Disp: , Rfl:     OMEPRAZOLE PO, Take  by mouth., Disp: , Rfl:     pravastatin (PRAVACHOL) 40 MG tablet, , Disp: , Rfl:     amoxicillin (AMOXIL) 875 MG tablet, Take 1 tablet by mouth 2 (Two) Times a Day., Disp: 14 tablet, Rfl: 0    desvenlafaxine (PRISTIQ) 50 MG 24 hr tablet, Take 1 tablet by mouth Daily. 2 tablets (Patient not taking: Reported on 12/27/2023), Disp: , Rfl:     dexlansoprazole (DEXILANT) 60 MG capsule, , Disp: , Rfl:     Omega-3 Fatty Acids (FISH OIL) 1000 MG capsule capsule, Take 2 capsules by mouth Daily With Breakfast. (Patient not taking: Reported on 4/22/2024), Disp: , Rfl:     Ozempic, 0.25 or 0.5 MG/DOSE, 2 MG/1.5ML solution pen-injector, , Disp: , Rfl:     Objective     Vital Signs   Temp:  [98.6 °F (37 °C)] 98.6 °F (37 °C)  Heart Rate:  [73] 73  BP: (128)/(83) 128/83    Physical Exam:  Physical Exam  Constitutional:       General: She is not in acute distress.     Appearance: Normal appearance. She is obese. She is not ill-appearing.   Cardiovascular:      Rate and Rhythm: Normal rate and regular rhythm.      Pulses: Normal  pulses.      Heart sounds: Normal heart sounds.   Pulmonary:      Effort: Pulmonary effort is normal. No respiratory distress.      Breath sounds: Normal breath sounds. No stridor. No wheezing or rales.   Abdominal:      General: Abdomen is flat. Bowel sounds are normal. There is no distension.      Palpations: Abdomen is soft.          Comments: Previous umbilical hernia repair with reported mesh placement.   Neurological:      Mental Status: She is alert.        Results Review:     Lab Results (last 24 hours)       ** No results found for the last 24 hours. **          Imaging Results (Last 24 Hours)       ** No results found for the last 24 hours. **            ASSESSMENT/PLAN   Diagnosis Plan   1. Hiatal hernia  Case Request    CBC (No Diff)    Comprehensive Metabolic Panel    lactated ringers infusion    ECG 12 Lead    Case Request      2. Gastroesophageal reflux disease, unspecified whether esophagitis present  Case Request    Case Request         Plan:    The patient and I discussed the need for a manometry and possible pH study prior to candidacy for a hiatal hernia repair with fundoplication.  Her manometry study was completed and are within normal parameters.  She has chosen laparoscopic approach with robotic assist for a paraesophageal hernia repair and an antireflux procedure. I agree with this decision.  I have discussed alternatives which include continued different degrees of fundoplication such as the Nissen, medical therapy, endoscopic approaches and behavioral changes provide the alternatives.  We discussed the benefits of the surgeries including the benefit improvement of GERD symptoms and injury to the distal esophagus due to reflux.  She is aware that partial fundoplication procedure is difficult to reversible.  We discussed the complications and risks which include the risk of perforation, leakage,bleeding, intra-abdominal organ injury, specifically the esophagus and stomach as well as the  risks of injuring the liver and spleen.  There is a risk of injury to the lungs and death.  I explained to the patient that there is a risk of infection, bleeding as well as specifically relating to the antireflux procedure the risks of abdominal bloating, difficulty swallowing or belching, nausea and diarrhea.  I explained to her the possibility that this procedure may not be performed laparoscopically and may require being converted to an opened procedure or aborted due to abnormal anatomy.    We discussed the patient may require a mesh which would be absorbable and determined necessary during surgery.  She also has an increased risk of small bowel and intra-abdominal organ injury due to her previous intra-abdominal surgical procedure specifically her incisional hernia repair with placement of mesh.  I explained that there may be adhesions around the mesh which would require to be taken down.  There may also be associated risk of increased weight gain due to tolerating more foods that they were unable to tolerate prior to the procedure.    I explained to the patient that the success of the surgery is also related to the motivation and dedication to behavioral changes that they have learned during their postoperative course.     Lexa Report   As part of this patient's treatment plan I am prescribing controlled substances.  We review Lexa in our educational course.  The patient has been made aware of appropriate use of such medications, including potential risk of somnolence, limited ability to drive and /or work safely, and potential for dependence or overdose. It has also been made clear that these medications are for use by this patient only, without concomitant use of alcohol or other substances unless prescribed.    Bre Birch will be required to complete the educational preoperative class detailing terms of the preoperative and postoperative recommendations, risks of surgery, the monitoring of the  patient's IRMA reports if necessary. Bre Birch is aware that inappropriate use of prescribed medications will result in cessation of prescribing such medications.    IRMA report has been reviewed.      History and physical exam exhibit continued safe and appropriate use of controlled substances.       Bre Birch understands the surgical procedures and the different surgical options that are available.  She was made aware that there is a preoperative diet that she will require to complete.  Bre Birch understands the lifestyle changes that are required after surgery and has agreed to follow the guidelines outlined in the weight management program. Bre Birch also expressed understanding of the risks involved and had all of her questions answered and desires to proceed.    She will be scheduled accordingly for a laparoscopic paraesophageal hernia repair with antireflux procedure pending manometry and possible pH study findings.              Uli Marte MD  04/22/24  09:05 CDT

## 2024-04-22 NOTE — TELEPHONE ENCOUNTER
Reached out to patient and gave her the time for her arrival and sx start time, along with pre sx workup time. Patient voiced an understanding and waiting on call from our dietician in regard to the LSD prior to her sx.

## 2024-04-23 ENCOUNTER — TELEPHONE (OUTPATIENT)
Dept: BARIATRICS/WEIGHT MGMT | Facility: CLINIC | Age: 54
End: 2024-04-23
Payer: COMMERCIAL

## 2024-04-24 ENCOUNTER — TELEPHONE (OUTPATIENT)
Dept: BARIATRICS/WEIGHT MGMT | Facility: CLINIC | Age: 54
End: 2024-04-24
Payer: COMMERCIAL

## 2024-04-24 NOTE — TELEPHONE ENCOUNTER
Pt called with questions regarding Liver Shrinking Diet she has been prescribed in preparation for hernia repair.     We reviewed the diet and answered all questions at this time. She is aware she can continue to reach out to the office via phone call or MyChart as needed.

## 2024-04-25 ENCOUNTER — PRE-ADMISSION TESTING (OUTPATIENT)
Dept: PREADMISSION TESTING | Facility: HOSPITAL | Age: 54
End: 2024-04-25
Payer: COMMERCIAL

## 2024-04-25 VITALS
HEART RATE: 85 BPM | OXYGEN SATURATION: 96 % | DIASTOLIC BLOOD PRESSURE: 58 MMHG | HEIGHT: 66 IN | RESPIRATION RATE: 16 BRPM | BODY MASS INDEX: 30.4 KG/M2 | WEIGHT: 189.15 LBS | SYSTOLIC BLOOD PRESSURE: 110 MMHG

## 2024-04-25 DIAGNOSIS — K44.9 HIATAL HERNIA: ICD-10-CM

## 2024-04-25 LAB
ALBUMIN SERPL-MCNC: 4.8 G/DL (ref 3.5–5.2)
ALBUMIN/GLOB SERPL: 1.8 G/DL
ALP SERPL-CCNC: 59 U/L (ref 39–117)
ALT SERPL W P-5'-P-CCNC: 23 U/L (ref 1–33)
ANION GAP SERPL CALCULATED.3IONS-SCNC: 10 MMOL/L (ref 5–15)
AST SERPL-CCNC: 18 U/L (ref 1–32)
BILIRUB SERPL-MCNC: 0.5 MG/DL (ref 0–1.2)
BUN SERPL-MCNC: 17 MG/DL (ref 6–20)
BUN/CREAT SERPL: 30.9 (ref 7–25)
CALCIUM SPEC-SCNC: 9.4 MG/DL (ref 8.6–10.5)
CHLORIDE SERPL-SCNC: 102 MMOL/L (ref 98–107)
CO2 SERPL-SCNC: 26 MMOL/L (ref 22–29)
CREAT SERPL-MCNC: 0.55 MG/DL (ref 0.57–1)
DEPRECATED RDW RBC AUTO: 39.3 FL (ref 37–54)
EGFRCR SERPLBLD CKD-EPI 2021: 111.1 ML/MIN/1.73
ERYTHROCYTE [DISTWIDTH] IN BLOOD BY AUTOMATED COUNT: 11.5 % (ref 12.3–15.4)
GLOBULIN UR ELPH-MCNC: 2.6 GM/DL
GLUCOSE SERPL-MCNC: 85 MG/DL (ref 65–99)
HCT VFR BLD AUTO: 42 % (ref 34–46.6)
HGB BLD-MCNC: 14.3 G/DL (ref 12–15.9)
MCH RBC QN AUTO: 31.6 PG (ref 26.6–33)
MCHC RBC AUTO-ENTMCNC: 34 G/DL (ref 31.5–35.7)
MCV RBC AUTO: 92.9 FL (ref 79–97)
PLATELET # BLD AUTO: 324 10*3/MM3 (ref 140–450)
PMV BLD AUTO: 9.7 FL (ref 6–12)
POTASSIUM SERPL-SCNC: 4 MMOL/L (ref 3.5–5.2)
PROT SERPL-MCNC: 7.4 G/DL (ref 6–8.5)
QT INTERVAL: 402 MS
QTC INTERVAL: 489 MS
RBC # BLD AUTO: 4.52 10*6/MM3 (ref 3.77–5.28)
SODIUM SERPL-SCNC: 138 MMOL/L (ref 136–145)
WBC NRBC COR # BLD AUTO: 5.41 10*3/MM3 (ref 3.4–10.8)

## 2024-04-25 PROCEDURE — 93005 ELECTROCARDIOGRAM TRACING: CPT

## 2024-04-25 PROCEDURE — 85027 COMPLETE CBC AUTOMATED: CPT

## 2024-04-25 PROCEDURE — 80053 COMPREHEN METABOLIC PANEL: CPT

## 2024-04-25 PROCEDURE — 36415 COLL VENOUS BLD VENIPUNCTURE: CPT

## 2024-04-25 NOTE — DISCHARGE INSTRUCTIONS

## 2024-04-30 LAB
QT INTERVAL: 402 MS
QTC INTERVAL: 489 MS

## 2024-05-02 ENCOUNTER — ANESTHESIA (OUTPATIENT)
Dept: PERIOP | Facility: HOSPITAL | Age: 54
End: 2024-05-02
Payer: COMMERCIAL

## 2024-05-02 ENCOUNTER — ANESTHESIA EVENT (OUTPATIENT)
Dept: PERIOP | Facility: HOSPITAL | Age: 54
End: 2024-05-02
Payer: COMMERCIAL

## 2024-05-02 ENCOUNTER — HOSPITAL ENCOUNTER (OUTPATIENT)
Facility: HOSPITAL | Age: 54
Discharge: HOME | End: 2024-05-03
Attending: SURGERY | Admitting: SURGERY
Payer: COMMERCIAL

## 2024-05-02 DIAGNOSIS — Z87.19 STATUS POST REPAIR OF PARAESOPHAGEAL DIAPHRAGMATIC HERNIA: Primary | ICD-10-CM

## 2024-05-02 DIAGNOSIS — Z98.890 STATUS POST REPAIR OF PARAESOPHAGEAL DIAPHRAGMATIC HERNIA: Primary | ICD-10-CM

## 2024-05-02 DIAGNOSIS — K44.9 HIATAL HERNIA: ICD-10-CM

## 2024-05-02 LAB
ABO GROUP BLD: NORMAL
B-HCG UR QL: NEGATIVE
BLD GP AB SCN SERPL QL: NEGATIVE
GLUCOSE BLDC GLUCOMTR-MCNC: 112 MG/DL (ref 70–130)
GLUCOSE BLDC GLUCOMTR-MCNC: 123 MG/DL (ref 70–130)
RH BLD: NEGATIVE
T&S EXPIRATION DATE: NORMAL

## 2024-05-02 PROCEDURE — 25010000002 DEXAMETHASONE PER 1 MG: Performed by: NURSE ANESTHETIST, CERTIFIED REGISTERED

## 2024-05-02 PROCEDURE — 25010000002 CEFAZOLIN PER 500 MG: Performed by: NURSE ANESTHETIST, CERTIFIED REGISTERED

## 2024-05-02 PROCEDURE — 25010000002 HYDROMORPHONE 1 MG/ML SOLUTION: Performed by: NURSE ANESTHETIST, CERTIFIED REGISTERED

## 2024-05-02 PROCEDURE — 25010000002 SUGAMMADEX 200 MG/2ML SOLUTION: Performed by: NURSE ANESTHETIST, CERTIFIED REGISTERED

## 2024-05-02 PROCEDURE — 25810000003 LACTATED RINGERS PER 1000 ML: Performed by: NURSE ANESTHETIST, CERTIFIED REGISTERED

## 2024-05-02 PROCEDURE — 25010000002 BUPIVACAINE (PF) 0.5 % SOLUTION 30 ML VIAL: Performed by: SURGERY

## 2024-05-02 PROCEDURE — 25010000002 ONDANSETRON PER 1 MG: Performed by: SURGERY

## 2024-05-02 PROCEDURE — 25810000003 LACTATED RINGERS PER 1000 ML: Performed by: SURGERY

## 2024-05-02 PROCEDURE — 25010000002 MORPHINE SULFATE (PF) 2 MG/ML SOLUTION 1 ML CARTRIDGE: Performed by: SURGERY

## 2024-05-02 PROCEDURE — G0378 HOSPITAL OBSERVATION PER HR: HCPCS

## 2024-05-02 PROCEDURE — 82948 REAGENT STRIP/BLOOD GLUCOSE: CPT

## 2024-05-02 PROCEDURE — 81025 URINE PREGNANCY TEST: CPT | Performed by: SURGERY

## 2024-05-02 PROCEDURE — 25010000002 THIAMINE HCL 200 MG/2ML SOLUTION: Performed by: SURGERY

## 2024-05-02 PROCEDURE — 25010000002 HYDROMORPHONE PER 4 MG: Performed by: SURGERY

## 2024-05-02 PROCEDURE — 25010000002 PROPOFOL 10 MG/ML EMULSION: Performed by: NURSE ANESTHETIST, CERTIFIED REGISTERED

## 2024-05-02 PROCEDURE — 25010000002 ONDANSETRON PER 1 MG: Performed by: NURSE ANESTHETIST, CERTIFIED REGISTERED

## 2024-05-02 PROCEDURE — 43281 LAP PARAESOPHAG HERN REPAIR: CPT | Performed by: SURGERY

## 2024-05-02 PROCEDURE — 25010000002 LIDOCAINE 1 % SOLUTION 20 ML VIAL: Performed by: SURGERY

## 2024-05-02 PROCEDURE — 25010000002 DEXAMETHASONE PER 1 MG: Performed by: SURGERY

## 2024-05-02 PROCEDURE — 25010000002 BUPIVACAINE 0.5 % SOLUTION 50 ML VIAL: Performed by: SURGERY

## 2024-05-02 PROCEDURE — 25010000002 FENTANYL CITRATE (PF) 100 MCG/2ML SOLUTION: Performed by: NURSE ANESTHETIST, CERTIFIED REGISTERED

## 2024-05-02 PROCEDURE — 86850 RBC ANTIBODY SCREEN: CPT | Performed by: SURGERY

## 2024-05-02 PROCEDURE — 25010000002 FENTANYL CITRATE (PF) 50 MCG/ML SOLUTION: Performed by: ANESTHESIOLOGY

## 2024-05-02 PROCEDURE — 25010000002 KETOROLAC TROMETHAMINE PER 15 MG: Performed by: SURGERY

## 2024-05-02 PROCEDURE — 25010000002 MIDAZOLAM PER 1 MG: Performed by: ANESTHESIOLOGY

## 2024-05-02 PROCEDURE — 86901 BLOOD TYPING SEROLOGIC RH(D): CPT | Performed by: SURGERY

## 2024-05-02 PROCEDURE — 86900 BLOOD TYPING SEROLOGIC ABO: CPT | Performed by: SURGERY

## 2024-05-02 PROCEDURE — 25010000002 CEFAZOLIN PER 500 MG: Performed by: SURGERY

## 2024-05-02 DEVICE — ABSORBABLE WOUND CLOSURE DEVICE
Type: IMPLANTABLE DEVICE | Site: ABDOMEN | Status: FUNCTIONAL
Brand: V-LOC 90

## 2024-05-02 RX ORDER — HYDROCHLOROTHIAZIDE 25 MG/1
12.5 TABLET ORAL
Status: DISCONTINUED | OUTPATIENT
Start: 2024-05-02 | End: 2024-05-03 | Stop reason: HOSPADM

## 2024-05-02 RX ORDER — SODIUM CHLORIDE, SODIUM LACTATE, POTASSIUM CHLORIDE, CALCIUM CHLORIDE 600; 310; 30; 20 MG/100ML; MG/100ML; MG/100ML; MG/100ML
INJECTION, SOLUTION INTRAVENOUS CONTINUOUS PRN
Status: DISCONTINUED | OUTPATIENT
Start: 2024-05-02 | End: 2024-05-02 | Stop reason: SURG

## 2024-05-02 RX ORDER — SODIUM CHLORIDE, SODIUM LACTATE, POTASSIUM CHLORIDE, CALCIUM CHLORIDE 600; 310; 30; 20 MG/100ML; MG/100ML; MG/100ML; MG/100ML
140 INJECTION, SOLUTION INTRAVENOUS CONTINUOUS
Status: DISCONTINUED | OUTPATIENT
Start: 2024-05-02 | End: 2024-05-03 | Stop reason: HOSPADM

## 2024-05-02 RX ORDER — NALOXONE HCL 0.4 MG/ML
0.1 VIAL (ML) INJECTION
Status: DISCONTINUED | OUTPATIENT
Start: 2024-05-02 | End: 2024-05-03 | Stop reason: HOSPADM

## 2024-05-02 RX ORDER — HYDROMORPHONE HYDROCHLORIDE 1 MG/ML
0.5 INJECTION, SOLUTION INTRAMUSCULAR; INTRAVENOUS; SUBCUTANEOUS
Status: DISCONTINUED | OUTPATIENT
Start: 2024-05-02 | End: 2024-05-03 | Stop reason: HOSPADM

## 2024-05-02 RX ORDER — SODIUM CHLORIDE 9 MG/ML
40 INJECTION, SOLUTION INTRAVENOUS AS NEEDED
Status: DISCONTINUED | OUTPATIENT
Start: 2024-05-02 | End: 2024-05-03 | Stop reason: HOSPADM

## 2024-05-02 RX ORDER — CEFAZOLIN SODIUM 1 G/3ML
INJECTION, POWDER, FOR SOLUTION INTRAMUSCULAR; INTRAVENOUS AS NEEDED
Status: DISCONTINUED | OUTPATIENT
Start: 2024-05-02 | End: 2024-05-02 | Stop reason: SURG

## 2024-05-02 RX ORDER — LABETALOL HYDROCHLORIDE 5 MG/ML
10 INJECTION, SOLUTION INTRAVENOUS
Status: DISCONTINUED | OUTPATIENT
Start: 2024-05-02 | End: 2024-05-03 | Stop reason: HOSPADM

## 2024-05-02 RX ORDER — INSULIN LISPRO 100 [IU]/ML
2-7 INJECTION, SOLUTION INTRAVENOUS; SUBCUTANEOUS
Status: DISCONTINUED | OUTPATIENT
Start: 2024-05-02 | End: 2024-05-03 | Stop reason: HOSPADM

## 2024-05-02 RX ORDER — SODIUM CHLORIDE 0.9 % (FLUSH) 0.9 %
1-10 SYRINGE (ML) INJECTION AS NEEDED
Status: DISCONTINUED | OUTPATIENT
Start: 2024-05-02 | End: 2024-05-03 | Stop reason: HOSPADM

## 2024-05-02 RX ORDER — LABETALOL HYDROCHLORIDE 5 MG/ML
5 INJECTION, SOLUTION INTRAVENOUS
Status: DISCONTINUED | OUTPATIENT
Start: 2024-05-02 | End: 2024-05-02 | Stop reason: HOSPADM

## 2024-05-02 RX ORDER — FENTANYL CITRATE 50 UG/ML
50 INJECTION, SOLUTION INTRAMUSCULAR; INTRAVENOUS
Status: DISCONTINUED | OUTPATIENT
Start: 2024-05-02 | End: 2024-05-02 | Stop reason: HOSPADM

## 2024-05-02 RX ORDER — SODIUM CHLORIDE, SODIUM LACTATE, POTASSIUM CHLORIDE, CALCIUM CHLORIDE 600; 310; 30; 20 MG/100ML; MG/100ML; MG/100ML; MG/100ML
1000 INJECTION, SOLUTION INTRAVENOUS CONTINUOUS
Status: DISCONTINUED | OUTPATIENT
Start: 2024-05-02 | End: 2024-05-02

## 2024-05-02 RX ORDER — HYDROMORPHONE HYDROCHLORIDE 1 MG/ML
0.2 INJECTION, SOLUTION INTRAMUSCULAR; INTRAVENOUS; SUBCUTANEOUS
Status: DISCONTINUED | OUTPATIENT
Start: 2024-05-02 | End: 2024-05-02 | Stop reason: HOSPADM

## 2024-05-02 RX ORDER — ROCURONIUM BROMIDE 10 MG/ML
INJECTION, SOLUTION INTRAVENOUS AS NEEDED
Status: DISCONTINUED | OUTPATIENT
Start: 2024-05-02 | End: 2024-05-02 | Stop reason: SURG

## 2024-05-02 RX ORDER — SODIUM CHLORIDE 0.9 % (FLUSH) 0.9 %
10 SYRINGE (ML) INJECTION AS NEEDED
Status: DISCONTINUED | OUTPATIENT
Start: 2024-05-02 | End: 2024-05-02 | Stop reason: HOSPADM

## 2024-05-02 RX ORDER — ACETAMINOPHEN 500 MG
1000 TABLET ORAL EVERY 6 HOURS PRN
COMMUNITY
End: 2024-05-03 | Stop reason: HOSPADM

## 2024-05-02 RX ORDER — ONDANSETRON 2 MG/ML
INJECTION INTRAMUSCULAR; INTRAVENOUS AS NEEDED
Status: DISCONTINUED | OUTPATIENT
Start: 2024-05-02 | End: 2024-05-02 | Stop reason: SURG

## 2024-05-02 RX ORDER — LOSARTAN POTASSIUM 50 MG/1
50 TABLET ORAL
Status: DISCONTINUED | OUTPATIENT
Start: 2024-05-03 | End: 2024-05-03 | Stop reason: HOSPADM

## 2024-05-02 RX ORDER — DEXAMETHASONE SODIUM PHOSPHATE 4 MG/ML
4 INJECTION, SOLUTION INTRA-ARTICULAR; INTRALESIONAL; INTRAMUSCULAR; INTRAVENOUS; SOFT TISSUE EVERY 8 HOURS PRN
Status: DISCONTINUED | OUTPATIENT
Start: 2024-05-02 | End: 2024-05-03 | Stop reason: HOSPADM

## 2024-05-02 RX ORDER — HYDROMORPHONE HYDROCHLORIDE 1 MG/ML
0.5 INJECTION, SOLUTION INTRAMUSCULAR; INTRAVENOUS; SUBCUTANEOUS
Status: DISCONTINUED | OUTPATIENT
Start: 2024-05-02 | End: 2024-05-02 | Stop reason: HOSPADM

## 2024-05-02 RX ORDER — FAMOTIDINE 10 MG/ML
20 INJECTION, SOLUTION INTRAVENOUS EVERY 12 HOURS SCHEDULED
Status: DISCONTINUED | OUTPATIENT
Start: 2024-05-02 | End: 2024-05-03 | Stop reason: HOSPADM

## 2024-05-02 RX ORDER — THIAMINE HYDROCHLORIDE 100 MG/ML
100 INJECTION, SOLUTION INTRAMUSCULAR; INTRAVENOUS ONCE
Status: COMPLETED | OUTPATIENT
Start: 2024-05-02 | End: 2024-05-02

## 2024-05-02 RX ORDER — SODIUM CHLORIDE, SODIUM LACTATE, POTASSIUM CHLORIDE, CALCIUM CHLORIDE 600; 310; 30; 20 MG/100ML; MG/100ML; MG/100ML; MG/100ML
140 INJECTION, SOLUTION INTRAVENOUS CONTINUOUS
Status: DISCONTINUED | OUTPATIENT
Start: 2024-05-02 | End: 2024-05-02

## 2024-05-02 RX ORDER — SODIUM CHLORIDE 0.9 % (FLUSH) 0.9 %
3 SYRINGE (ML) INJECTION EVERY 12 HOURS SCHEDULED
Status: DISCONTINUED | OUTPATIENT
Start: 2024-05-02 | End: 2024-05-02 | Stop reason: HOSPADM

## 2024-05-02 RX ORDER — LIDOCAINE HYDROCHLORIDE 20 MG/ML
INJECTION, SOLUTION EPIDURAL; INFILTRATION; INTRACAUDAL; PERINEURAL AS NEEDED
Status: DISCONTINUED | OUTPATIENT
Start: 2024-05-02 | End: 2024-05-02 | Stop reason: SURG

## 2024-05-02 RX ORDER — FENTANYL CITRATE 50 UG/ML
INJECTION, SOLUTION INTRAMUSCULAR; INTRAVENOUS AS NEEDED
Status: DISCONTINUED | OUTPATIENT
Start: 2024-05-02 | End: 2024-05-02 | Stop reason: SURG

## 2024-05-02 RX ORDER — SODIUM CHLORIDE 0.9 % (FLUSH) 0.9 %
3 SYRINGE (ML) INJECTION AS NEEDED
Status: DISCONTINUED | OUTPATIENT
Start: 2024-05-02 | End: 2024-05-02 | Stop reason: HOSPADM

## 2024-05-02 RX ORDER — ONDANSETRON 2 MG/ML
4 INJECTION INTRAMUSCULAR; INTRAVENOUS EVERY 6 HOURS
Status: DISCONTINUED | OUTPATIENT
Start: 2024-05-02 | End: 2024-05-03 | Stop reason: HOSPADM

## 2024-05-02 RX ORDER — SODIUM CHLORIDE 9 MG/ML
40 INJECTION, SOLUTION INTRAVENOUS AS NEEDED
Status: DISCONTINUED | OUTPATIENT
Start: 2024-05-02 | End: 2024-05-02 | Stop reason: HOSPADM

## 2024-05-02 RX ORDER — SODIUM CHLORIDE 0.9 % (FLUSH) 0.9 %
10 SYRINGE (ML) INJECTION EVERY 12 HOURS SCHEDULED
Status: DISCONTINUED | OUTPATIENT
Start: 2024-05-02 | End: 2024-05-03 | Stop reason: HOSPADM

## 2024-05-02 RX ORDER — DEXAMETHASONE SODIUM PHOSPHATE 4 MG/ML
INJECTION, SOLUTION INTRA-ARTICULAR; INTRALESIONAL; INTRAMUSCULAR; INTRAVENOUS; SOFT TISSUE AS NEEDED
Status: DISCONTINUED | OUTPATIENT
Start: 2024-05-02 | End: 2024-05-02 | Stop reason: SURG

## 2024-05-02 RX ORDER — DESVENLAFAXINE SUCCINATE 50 MG/1
100 TABLET, EXTENDED RELEASE ORAL DAILY
Status: DISCONTINUED | OUTPATIENT
Start: 2024-05-02 | End: 2024-05-03 | Stop reason: HOSPADM

## 2024-05-02 RX ORDER — MAGNESIUM HYDROXIDE 1200 MG/15ML
LIQUID ORAL AS NEEDED
Status: DISCONTINUED | OUTPATIENT
Start: 2024-05-02 | End: 2024-05-02 | Stop reason: HOSPADM

## 2024-05-02 RX ORDER — SUCCINYLCHOLINE/SOD CL,ISO/PF 200MG/10ML
SYRINGE (ML) INTRAVENOUS AS NEEDED
Status: DISCONTINUED | OUTPATIENT
Start: 2024-05-02 | End: 2024-05-02 | Stop reason: SURG

## 2024-05-02 RX ORDER — SODIUM CHLORIDE, SODIUM LACTATE, POTASSIUM CHLORIDE, CALCIUM CHLORIDE 600; 310; 30; 20 MG/100ML; MG/100ML; MG/100ML; MG/100ML
100 INJECTION, SOLUTION INTRAVENOUS CONTINUOUS
Status: DISCONTINUED | OUTPATIENT
Start: 2024-05-02 | End: 2024-05-02

## 2024-05-02 RX ORDER — ACETAMINOPHEN 160 MG/5ML
325 SOLUTION ORAL EVERY 8 HOURS SCHEDULED
Status: DISCONTINUED | OUTPATIENT
Start: 2024-05-02 | End: 2024-05-03 | Stop reason: HOSPADM

## 2024-05-02 RX ORDER — DIPHENHYDRAMINE HYDROCHLORIDE 50 MG/ML
25 INJECTION INTRAMUSCULAR; INTRAVENOUS EVERY 6 HOURS PRN
Status: DISCONTINUED | OUTPATIENT
Start: 2024-05-02 | End: 2024-05-03 | Stop reason: HOSPADM

## 2024-05-02 RX ORDER — KETOROLAC TROMETHAMINE 30 MG/ML
30 INJECTION, SOLUTION INTRAMUSCULAR; INTRAVENOUS EVERY 6 HOURS PRN
Status: DISCONTINUED | OUTPATIENT
Start: 2024-05-02 | End: 2024-05-03 | Stop reason: HOSPADM

## 2024-05-02 RX ORDER — PROPOFOL 10 MG/ML
VIAL (ML) INTRAVENOUS AS NEEDED
Status: DISCONTINUED | OUTPATIENT
Start: 2024-05-02 | End: 2024-05-02 | Stop reason: SURG

## 2024-05-02 RX ORDER — SODIUM CHLORIDE 0.9 % (FLUSH) 0.9 %
3-10 SYRINGE (ML) INJECTION AS NEEDED
Status: DISCONTINUED | OUTPATIENT
Start: 2024-05-02 | End: 2024-05-02 | Stop reason: HOSPADM

## 2024-05-02 RX ORDER — FLUMAZENIL 0.1 MG/ML
0.2 INJECTION INTRAVENOUS AS NEEDED
Status: DISCONTINUED | OUTPATIENT
Start: 2024-05-02 | End: 2024-05-02 | Stop reason: HOSPADM

## 2024-05-02 RX ORDER — SIMETHICONE 80 MG
40 TABLET,CHEWABLE ORAL 4 TIMES DAILY PRN
Status: DISCONTINUED | OUTPATIENT
Start: 2024-05-02 | End: 2024-05-03 | Stop reason: HOSPADM

## 2024-05-02 RX ORDER — NALOXONE HCL 0.4 MG/ML
0.04 VIAL (ML) INJECTION AS NEEDED
Status: DISCONTINUED | OUTPATIENT
Start: 2024-05-02 | End: 2024-05-02 | Stop reason: HOSPADM

## 2024-05-02 RX ORDER — BUPIVACAINE HCL/0.9 % NACL/PF 0.125 %
PLASTIC BAG, INJECTION (ML) EPIDURAL AS NEEDED
Status: DISCONTINUED | OUTPATIENT
Start: 2024-05-02 | End: 2024-05-02 | Stop reason: SURG

## 2024-05-02 RX ORDER — LOSARTAN POTASSIUM AND HYDROCHLOROTHIAZIDE 12.5; 5 MG/1; MG/1
1 TABLET ORAL DAILY
COMMUNITY
Start: 2024-04-08

## 2024-05-02 RX ORDER — MIDAZOLAM HYDROCHLORIDE 1 MG/ML
1 INJECTION INTRAMUSCULAR; INTRAVENOUS
Status: DISCONTINUED | OUTPATIENT
Start: 2024-05-02 | End: 2024-05-02 | Stop reason: HOSPADM

## 2024-05-02 RX ADMIN — SODIUM CHLORIDE, POTASSIUM CHLORIDE, SODIUM LACTATE AND CALCIUM CHLORIDE 140 ML/HR: 600; 310; 30; 20 INJECTION, SOLUTION INTRAVENOUS at 13:54

## 2024-05-02 RX ADMIN — THIAMINE HYDROCHLORIDE 100 MG: 100 INJECTION, SOLUTION INTRAMUSCULAR; INTRAVENOUS at 13:53

## 2024-05-02 RX ADMIN — Medication 200 MCG: at 10:21

## 2024-05-02 RX ADMIN — SUGAMMADEX 200 MG: 100 INJECTION, SOLUTION INTRAVENOUS at 10:45

## 2024-05-02 RX ADMIN — KETOROLAC TROMETHAMINE 30 MG: 30 INJECTION, SOLUTION INTRAMUSCULAR; INTRAVENOUS at 20:12

## 2024-05-02 RX ADMIN — FENTANYL CITRATE 50 MCG: 50 INJECTION, SOLUTION INTRAMUSCULAR; INTRAVENOUS at 12:13

## 2024-05-02 RX ADMIN — FENTANYL CITRATE 100 MCG: 50 INJECTION, SOLUTION INTRAMUSCULAR; INTRAVENOUS at 07:18

## 2024-05-02 RX ADMIN — MIDAZOLAM 1 MG: 1 INJECTION INTRAMUSCULAR; INTRAVENOUS at 06:54

## 2024-05-02 RX ADMIN — HYDROMORPHONE HYDROCHLORIDE 0.5 MG: 1 INJECTION, SOLUTION INTRAMUSCULAR; INTRAVENOUS; SUBCUTANEOUS at 18:49

## 2024-05-02 RX ADMIN — HYDROCODONE BITARTRATE AND ACETAMINOPHEN 10 ML: 7.5; 325 SOLUTION ORAL at 22:06

## 2024-05-02 RX ADMIN — DEXAMETHASONE SODIUM PHOSPHATE 4 MG: 4 INJECTION, SOLUTION INTRAMUSCULAR; INTRAVENOUS at 09:38

## 2024-05-02 RX ADMIN — PROPOFOL 200 MG: 10 INJECTION, EMULSION INTRAVENOUS at 07:18

## 2024-05-02 RX ADMIN — CEFAZOLIN 2 G: 2 INJECTION, POWDER, FOR SOLUTION INTRAMUSCULAR; INTRAVENOUS at 23:17

## 2024-05-02 RX ADMIN — Medication 10 ML: at 13:54

## 2024-05-02 RX ADMIN — HYDROCHLOROTHIAZIDE 12.5 MG: 25 TABLET ORAL at 13:53

## 2024-05-02 RX ADMIN — FAMOTIDINE 20 MG: 10 INJECTION INTRAVENOUS at 20:12

## 2024-05-02 RX ADMIN — SODIUM CHLORIDE, POTASSIUM CHLORIDE, SODIUM LACTATE AND CALCIUM CHLORIDE 140 ML/HR: 600; 310; 30; 20 INJECTION, SOLUTION INTRAVENOUS at 06:13

## 2024-05-02 RX ADMIN — ONDANSETRON 4 MG: 2 INJECTION INTRAMUSCULAR; INTRAVENOUS at 17:03

## 2024-05-02 RX ADMIN — Medication 300 MCG: at 08:17

## 2024-05-02 RX ADMIN — ONDANSETRON 4 MG: 2 INJECTION INTRAMUSCULAR; INTRAVENOUS at 22:06

## 2024-05-02 RX ADMIN — LIDOCAINE HYDROCHLORIDE 100 MG: 20 INJECTION, SOLUTION EPIDURAL; INFILTRATION; INTRACAUDAL; PERINEURAL at 07:18

## 2024-05-02 RX ADMIN — CEFAZOLIN 2 G: 1 INJECTION, POWDER, FOR SOLUTION INTRAMUSCULAR; INTRAVENOUS; PARENTERAL at 07:30

## 2024-05-02 RX ADMIN — ONDANSETRON 4 MG: 2 INJECTION INTRAMUSCULAR; INTRAVENOUS at 10:30

## 2024-05-02 RX ADMIN — ROCURONIUM BROMIDE 25 MG: 10 INJECTION, SOLUTION INTRAVENOUS at 09:17

## 2024-05-02 RX ADMIN — HYDROMORPHONE HYDROCHLORIDE 0.5 MG: 1 INJECTION, SOLUTION INTRAMUSCULAR; INTRAVENOUS; SUBCUTANEOUS at 09:38

## 2024-05-02 RX ADMIN — SODIUM CHLORIDE, POTASSIUM CHLORIDE, SODIUM LACTATE AND CALCIUM CHLORIDE 140 ML/HR: 600; 310; 30; 20 INJECTION, SOLUTION INTRAVENOUS at 23:22

## 2024-05-02 RX ADMIN — CEFAZOLIN 2 G: 2 INJECTION, POWDER, FOR SOLUTION INTRAMUSCULAR; INTRAVENOUS at 17:03

## 2024-05-02 RX ADMIN — DESVENLAFAXINE SUCCINATE 100 MG: 50 TABLET, EXTENDED RELEASE ORAL at 13:54

## 2024-05-02 RX ADMIN — ROCURONIUM BROMIDE 5 MG: 10 INJECTION, SOLUTION INTRAVENOUS at 07:18

## 2024-05-02 RX ADMIN — FENTANYL CITRATE 100 MCG: 50 INJECTION, SOLUTION INTRAMUSCULAR; INTRAVENOUS at 07:48

## 2024-05-02 RX ADMIN — Medication 200 MCG: at 09:03

## 2024-05-02 RX ADMIN — Medication 160 MG: at 07:18

## 2024-05-02 RX ADMIN — HYDROMORPHONE HYDROCHLORIDE 0.5 MG: 1 INJECTION, SOLUTION INTRAMUSCULAR; INTRAVENOUS; SUBCUTANEOUS at 09:54

## 2024-05-02 RX ADMIN — ROCURONIUM BROMIDE 45 MG: 10 INJECTION, SOLUTION INTRAVENOUS at 07:30

## 2024-05-02 RX ADMIN — FAMOTIDINE 20 MG: 10 INJECTION INTRAVENOUS at 13:53

## 2024-05-02 RX ADMIN — SODIUM CHLORIDE, POTASSIUM CHLORIDE, SODIUM LACTATE AND CALCIUM CHLORIDE: 600; 310; 30; 20 INJECTION, SOLUTION INTRAVENOUS at 07:25

## 2024-05-02 RX ADMIN — ROCURONIUM BROMIDE 25 MG: 10 INJECTION, SOLUTION INTRAVENOUS at 08:11

## 2024-05-02 RX ADMIN — ACETAMINOPHEN 325 MG: 160 SUSPENSION ORAL at 13:53

## 2024-05-02 NOTE — ANESTHESIA PREPROCEDURE EVALUATION
Anesthesia Evaluation     no history of anesthetic complications:   NPO Solid Status: > 8 hours  NPO Liquid Status: > 2 hours           Airway   Mallampati: II  TM distance: >3 FB  No difficulty expected  Dental      Pulmonary    (+) ,sleep apnea  (-) not a smoker  Cardiovascular   Exercise tolerance: good (4-7 METS)    (+) hyperlipidemia  (-) CAD      Neuro/Psych  (-) seizures, TIA, CVA  GI/Hepatic/Renal/Endo    (+) obesity, hiatal hernia, GERD, diabetes mellitus (borderline), thyroid problem hypothyroidism  (-) liver disease, no renal disease    Musculoskeletal     Abdominal    Substance History      OB/GYN          Other                    Anesthesia Plan    ASA 2     general     intravenous induction     Anesthetic plan, risks, benefits, and alternatives have been provided, discussed and informed consent has been obtained with: patient.    CODE STATUS:

## 2024-05-02 NOTE — ANESTHESIA PROCEDURE NOTES
Airway  Urgency: elective    Date/Time: 5/2/2024 7:19 AM  Airway not difficult    General Information and Staff    Patient location during procedure: OR  CRNA/CAA: Nelson Marte CRNA    Indications and Patient Condition  Indications for airway management: airway protection    Preoxygenated: yes  Mask difficulty assessment: 1 - vent by mask    Final Airway Details  Final airway type: endotracheal airway      Successful airway: ETT  Cuffed: yes   Successful intubation technique: video laryngoscopy  Facilitating devices/methods: intubating stylet  Endotracheal tube insertion site: oral  Blade: Ugalde  Blade size: 3  ETT size (mm): 7.0  Cormack-Lehane Classification: grade IIa - partial view of glottis  Placement verified by: chest auscultation and capnometry   Cuff volume (mL): 6  Measured from: lips  ETT/EBT  to lips (cm): 20  Number of attempts at approach: 1  Assessment: lips, teeth, and gum same as pre-op and atraumatic intubation

## 2024-05-02 NOTE — SIGNIFICANT NOTE
Patient has subcutaneous emphysema around face, down both sides of neck and shoulder and down right arm.

## 2024-05-02 NOTE — BRIEF OP NOTE
LAPAROSCOPIC HIATAL HERNIA REPAIR WITH DAVINCI ROBOT  Progress Note    Bre Birch  5/2/2024    Pre-op Diagnosis:   Hiatal hernia [K44.9]  Gastroesophageal reflux disease, unspecified whether esophagitis present [K21.9]       Post-Op Diagnosis Codes:     * Hiatal hernia [K44.9]     * Gastroesophageal reflux disease, unspecified whether esophagitis present [K21.9]    Procedure/CPT® Codes:        Procedure(s):  LAPAROSCOPIC HIATAL HERNIA REPAIR WITH DAVINCI ROBOT with intraoperative upper endoscopy              Surgeon(s):  Uli Marte MD    Anesthesia: General    Staff:   Circulator: Krysten Pennington RN; Carmen Mckeon RN  Scrub Person: Louise Sanchez CST; Carmen Hawthorne         Estimated Blood Loss: minimal    Urine Voided: * No values recorded between 5/2/2024  7:14 AM and 5/2/2024 10:45 AM *    Specimens:                None          Drains:   Urethral Catheter Double-lumen 16 Fr. (Active)       Findings: Paraesophageal hernia defect approximately 4 cm type I        Complications: None       Uli Marte MD     Date: 5/2/2024  Time: 10:50 CDT         Previously Declined (within the last year)

## 2024-05-02 NOTE — PLAN OF CARE
Goal Outcome Evaluation:         Pt is A&Ox4 and afebrile. Naik has been pulled and LR is going per order. She is to have an esophogram in the AM. She is to be NPO until the esophogram is done.

## 2024-05-02 NOTE — OP NOTE
LAPAROSCOPIC HIATAL HERNIA REPAIR WITH DAVINCI ROBOT  Progress Note     Bre Birch  5/2/2024     Pre-op Diagnosis:   Hiatal hernia [K44.9]  Gastroesophageal reflux disease, unspecified whether esophagitis present [K21.9]       Post-Op Diagnosis Codes:     * Hiatal hernia [K44.9]     * Gastroesophageal reflux disease, unspecified whether esophagitis present [K21.9]     Procedure/CPT® Codes:        Procedure(s):  LAPAROSCOPIC HIATAL HERNIA REPAIR WITH DAVINCI ROBOT with intraoperative upper endoscopy    Indications: The patient was admitted to the hospital with history of GERD with findings of a paraesophageal hernia and now scheduled for a robotic assisted paraesophageal hernia repair with fundoplication.       Surgeon: Uli Marte MD     Assistants: Louise was responsible for performing the following activities: Retraction, Suction, Closing, Placing Dressing, and Held/Positioned Camera and their skilled assistance was necessary for the success of this case.    Anesthesia: General endotracheal anesthesia    ASA Class: 3          Procedure Details         After the patient was explained the alternatives, benefits, complications, and risks of the robotic-assisted paraesophageal hernia repair and fundoplication informed consent was obtained.  The patient was brought to the OR suite after receiving preoperative antibiotics, medications and paralytic.  The patient was placed under general anesthesia.  The patient was then prepped and draped in standard fashion.  We performed a surgical Timeout.  Naik catheter was placed while patient was under general anesthesia.  A 40 Yoruba bougie was placed into the oropharynx by anesthesia followed by placement to suction.  I then locally anesthetized a left upper quadrant incision site for placement of an incision followed by placement of a Veress needle.  I insufflated the abdominal cavity to a pressure of 15 mmHg.  I then proceeded to locally anesthetize the Left upper  quadrant site for placement of an 8 mm incision which was followed by placement of an 8 mm air seal trocar into the abdominal cavity with camera assist.  I then performed a tap block.  The trocar was converted to an air seal setting.  An 8 mm incision was made in the periumbilical midline location for placement of an 8 mm trocar under direct visualization, location approximately 28 cm from the sternal notch.  An  8 mm trocar was placed on the patient's right upper quadrant lateral to the periumbilical trocar under direct visualization as well as an 8 mm trocar which was placed in the left upper quadrant's lateral site along the same plane under direct visualization.  This was then followed by placement of an 8 mm trocar in the left quadrant lateral to the site.   A 5 mm incision was placed in the subxiphoid location for placement of a 5 mm trocar under direct visualization.  I removed this trocar and replaced it with a Nicanor liver retractor.  A significant hiatal hernia defect was observed approximately 4 to 5 cm type sliding.  Once adequate exposure of the hiatus and stomach was obtained, I then docked the robot to the trocars.  My robotic instruments were then placed under direct visualization into the surgical field.  I then went to the robotic console and began the procedure.  I proceeded with entering the lesser sac bluntly.  I then proceeded with ligating the greater curvature vessels starting mid greater curvature working my way proximally to the short gastrics.  I continued proximally along the greater curvature ligating the vessels using the vessel sealer.  Once completion of ligation of the vessels was obtained. I dissected away attachments found posteriorly to the stomach.  Care was made to assure no injury to the pancreas. Completion of my dissection was obtained once visualization of the posterior left crural muscle was seen.    The proximal stomach was in the thoracic cavity which required  "continued dissection approximately and freeing up the sac attachments.  I then focused on taking down the hiatal hernia sac.  This was done starting from the left crura posterior to the stomach and working my way circumferentially around the stomach and esophagus to the anterior portion of the left crural muscles.  I continued my dissection to the anterior portion of the esophagus and right crura muscle.  I then approached the right crural muscle dissection entering the pars flaccida and creating a window posterior to the esophagus and stomach.  The sac was reduced and the stomach and esophagus were freed the redundant sac tissue and fat pad was transected and sent off.  I continued my dissection proximally along the esophagus protecting the vagus nerve which was identified.  I obtained at least 6 cm of esophageal length into the abdominal cavity without recoil or tension.  Once this was accomplished I then reapproximated the right and left crura muscles posteriorly with an interrupted 0 Vicryl suture in a figure-of-eight stitch x 2.  And then proximal to this a single interrupted 0 Ethibond stitch.  I proceeded with the fundoplication which was a \"toupee\" 270 degree technique.  My initial distal esophageal gastric fundoplications suture incorporated the posterior crural muscles repair.   I continue with  additional interrupted 2-0 Vicryl fundoplication sutures.This was performed on the right side of the esophagus.  Similarly on the left side of the esophagus 3 interrupted fundoplication sutures were made on the lateral portion of the esophagus with the most distal stitch incorporating the crural muscles.  Of note the bougie was removed prior to completing the partial fundoplication.  Fundoplication which included the esophagus remained in the abdominal cavity without tension or recoil.  I then performed an upper endoscopy to assess the fundoplication which was present.  GE junction remained within the abdominal " cavity.  The Nicanor liver retractor was removed under direct visualization.    The pneumoperitoneum evacuated and I requested a Valsalva from anesthesia prior to removing the remaining trocars.  Then re-locally anesthetized skin wounds for closure.  Skin wounds were closed with 4-0 Monocryl.  Prior to closing skin wounds all lap pads and attachments were accounted for at the end of the procedure.        Findings: Paraesophageal hernia    Estimated Blood Loss:  minimal           Drains: Naik catheter           Total IV Fluids: 1000 mL           Specimens:   None           Implants:   Implant Name Type Inv. Item Serial No.  Lot No. LRB No. Used Action   DEV CLS WND VLOC/90 RUTH ABS 1/2CIR SZ3/0 26MM 23CM [facility] [id number] Implant DEV CLS WND VLOC/90 RUTH ABS 1/2CIR SZ3/0 26MM 23CM [facility] [id number] N/A 1 Implanted              Complications: None           Disposition: PACU - hemodynamically stable.           Condition: stable

## 2024-05-02 NOTE — PROGRESS NOTES
Patient Care Team:  Rik Berry MD as PCP - General  Rik Berry MD as PCP - Family Medicine  Antonio Lujan MD as Consulting Physician (Pulmonary Disease)  Jovana Hayden MD as Surgeon (General Surgery)  Kenia Aguirre APRN as Nurse Practitioner (Obstetrics and Gynecology)    Qasim Birch is POD today from paraesophageal hernia repair.  Patient is resting comfortably does have subcu emphysema in the upper chest and lower neck location.  Pain adequately controlled.  Breathing without difficulty.       Review of Systems:     Review of Systems - General ROS: negative  Respiratory ROS: no cough, shortness of breath, or wheezing  Cardiovascular ROS: no chest pain or dyspnea on exertion  Gastrointestinal ROS: no abdominal pain, change in bowel habits, or black or bloody stools    Objective     Vital Signs  /76 (BP Location: Right arm, Patient Position: Lying)   Pulse 102   Temp 98.1 °F (36.7 °C)   Resp 16   Wt 84.3 kg (185 lb 13.6 oz)   SpO2 90%   Breastfeeding No   BMI 30.12 kg/m²     Physical Exam:    HEENT: PERRLA and extra ocular movement intact  Respiratory: appears well, vitals normal, no respiratory distress, acyanotic, normal RR, chest clear, no wheezing, crepitations, rhonchi, normal symmetric air entry  Cardiovascular: Regular rate and rhythm, S1, S2 normal, no murmur, click, rub or gallop  GI: Soft, non-tender, normal bowel sounds; no bruits, organomegaly or masses.  Abnormal shape: flat  Musculoskeletal: inspection - no abnormality  Neurologic: alert, oriented, normal speech, no focal findings or movement disorder noted     Results Review:    None  Lab Results (last 24 hours)       Procedure Component Value Units Date/Time    Pregnancy, Urine - Urine, Clean Catch [[id number]]  (Normal) Collected: 05/02/24 0613    Specimen: Urine, Clean Catch Updated: 05/02/24 0627     HCG, Urine QL Negative              Medication Reviewed:   Current  Facility-Administered Medications   Medication Dose Route Frequency Provider Last Rate Last Admin    acetaminophen (TYLENOL) 160 MG/5ML oral solution 325 mg  325 mg Oral Q8H Uli Marte MD   325 mg at 05/02/24 1353    ceFAZolin 2000 mg IVPB in 100 mL NS (MBP)  2 g Intravenous Q8H Uli Marte MD        desvenlafaxine (PRISTIQ) 24 hr tablet 100 mg  100 mg Oral Daily Uli Marte MD   100 mg at 05/02/24 1354    dexAMETHasone (DECADRON) injection 4 mg  4 mg Intravenous Q8H PRN Uli Marte MD        diphenhydrAMINE (BENADRYL) injection 25 mg  25 mg Intravenous Q6H PRN Uli Marte MD        famotidine (PEPCID) injection 20 mg  20 mg Intravenous Q12H Uli Marte MD   20 mg at 05/02/24 1353    [START ON 5/3/2024] losartan (COZAAR) tablet 50 mg  50 mg Oral Q24H Uli Marte MD        And    hydroCHLOROthiazide tablet 12.5 mg  12.5 mg Oral Q24H Uli Marte MD   12.5 mg at 05/02/24 1353    HYDROcodone-acetaminophen (HYCET) 7.5-325 MG/15ML solution 10 mL  10 mL Oral Q4H PRN Uli Marte MD        HYDROmorphone (DILAUDID) injection 0.5 mg  0.5 mg Intravenous Q2H PRN Uli Marte MD        And    naloxone (NARCAN) injection 0.1 mg  0.1 mg Intravenous Q5 Min PRN Uli Marte MD        Insulin Lispro (humaLOG) injection 2-7 Units  2-7 Units Subcutaneous 4x Daily AC & at Bedtime Uli Marte MD        ketorolac (TORADOL) injection 30 mg  30 mg Intravenous Q6H PRN Uli Marte MD        labetalol (NORMODYNE,TRANDATE) injection 10 mg  10 mg Intravenous Q30 Min PRN Uli Marte MD        lactated ringers infusion  140 mL/hr Intravenous Continuous Uli Marte  mL/hr at 05/02/24 1354 140 mL/hr at 05/02/24 1354    ondansetron (ZOFRAN) injection 4 mg  4 mg Intravenous Q6H Uli Marte MD        simethicone (MYLICON) chewable tablet 40 mg  40 mg Oral 4x Daily PRN Uli Marte MD        sodium chloride 0.9 % flush 1-10 mL  1-10 mL Intravenous  PRUli Lawler MD        sodium chloride 0.9 % flush 10 mL  10 mL Intravenous Q12H Uli Marte MD   10 mL at 05/02/24 1354    sodium chloride 0.9 % infusion 40 mL  40 mL Intravenous Uli Dahl MD           Assessment & Plan  POD today from paraesophageal hernia repair.  Continue progress.  Encourage incentive spirometer today and encouraged to ambulate.  Will await esophagram in a.m.  Reassess status in a.m.      Uli Marte MD  05/02/24  14:33 CDT

## 2024-05-02 NOTE — ANESTHESIA POSTPROCEDURE EVALUATION
Patient: Bre Birch    Procedure Summary       Date: 05/02/24 Room / Location: [facility] OR  /  [facility] OR    Anesthesia Start: 0714 Anesthesia Stop: 1101    Procedure: LAPAROSCOPIC HIATAL HERNIA REPAIR WITH DAVINCI ROBOT Diagnosis:       Hiatal hernia      Gastroesophageal reflux disease, unspecified whether esophagitis present      (Hiatal hernia [K44.9])      (Gastroesophageal reflux disease, unspecified whether esophagitis present [K21.9])    Surgeons: Uli Marte MD Provider: Nelson Marte CRNA    Anesthesia Type: general ASA Status: 2            Anesthesia Type: general    Vitals  Vitals Value Taken Time   /77 05/02/24 1257   Temp 98.6 °F (37 °C) 05/02/24 1257   Pulse 108 05/02/24 1257   Resp 16 05/02/24 1257   SpO2 93 % 05/02/24 1257           Post Anesthesia Care and Evaluation    Patient location during evaluation: PACU  Patient participation: complete - patient participated  Level of consciousness: awake and alert  Pain management: adequate    Airway patency: patent  Anesthetic complications: No anesthetic complications  PONV Status: none  Cardiovascular status: acceptable and hemodynamically stable  Respiratory status: acceptable  Hydration status: acceptable    Comments: Blood pressure 128/78, pulse 106, temperature 98.1 °F (36.7 °C), resp. rate 16, weight 84.3 kg (185 lb 13.6 oz), SpO2 91%, not currently breastfeeding.    Patient discharged from PACU based upon Elyse score. Please see RN notes for further details

## 2024-05-03 ENCOUNTER — APPOINTMENT (OUTPATIENT)
Dept: GENERAL RADIOLOGY | Facility: HOSPITAL | Age: 54
End: 2024-05-03
Payer: COMMERCIAL

## 2024-05-03 VITALS
HEART RATE: 76 BPM | HEIGHT: 66 IN | RESPIRATION RATE: 16 BRPM | TEMPERATURE: 96.7 F | SYSTOLIC BLOOD PRESSURE: 94 MMHG | WEIGHT: 185.85 LBS | OXYGEN SATURATION: 95 % | BODY MASS INDEX: 29.87 KG/M2 | DIASTOLIC BLOOD PRESSURE: 64 MMHG

## 2024-05-03 LAB
GLUCOSE BLDC GLUCOMTR-MCNC: 88 MG/DL (ref 70–130)
GLUCOSE BLDC GLUCOMTR-MCNC: 90 MG/DL (ref 70–130)
HCT VFR BLD AUTO: 40.1 % (ref 34–46.6)
HGB BLD-MCNC: 13.6 G/DL (ref 12–15.9)

## 2024-05-03 PROCEDURE — 25010000002 HYDROMORPHONE PER 4 MG: Performed by: SURGERY

## 2024-05-03 PROCEDURE — 25810000003 LACTATED RINGERS PER 1000 ML: Performed by: SURGERY

## 2024-05-03 PROCEDURE — 0 DIATRIZOATE MEGLUMINE & SODIUM PER 1 ML: Performed by: SURGERY

## 2024-05-03 PROCEDURE — 25010000002 ONDANSETRON PER 1 MG: Performed by: SURGERY

## 2024-05-03 PROCEDURE — 82948 REAGENT STRIP/BLOOD GLUCOSE: CPT

## 2024-05-03 PROCEDURE — 74220 X-RAY XM ESOPHAGUS 1CNTRST: CPT

## 2024-05-03 PROCEDURE — 85018 HEMOGLOBIN: CPT | Performed by: SURGERY

## 2024-05-03 PROCEDURE — G0378 HOSPITAL OBSERVATION PER HR: HCPCS

## 2024-05-03 PROCEDURE — 85014 HEMATOCRIT: CPT | Performed by: SURGERY

## 2024-05-03 PROCEDURE — 71045 X-RAY EXAM CHEST 1 VIEW: CPT

## 2024-05-03 RX ORDER — TRAMADOL HYDROCHLORIDE 50 MG/1
50 TABLET ORAL ONCE AS NEEDED
Status: DISCONTINUED | OUTPATIENT
Start: 2024-05-03 | End: 2024-05-03 | Stop reason: HOSPADM

## 2024-05-03 RX ORDER — TRAMADOL HYDROCHLORIDE 50 MG/1
50 TABLET ORAL EVERY 8 HOURS PRN
Qty: 30 TABLET | Refills: 0 | Status: SHIPPED | OUTPATIENT
Start: 2024-05-03 | End: 2024-05-10

## 2024-05-03 RX ORDER — SIMETHICONE 80 MG
40 TABLET,CHEWABLE ORAL EVERY 6 HOURS PRN
Qty: 30 TABLET | Refills: 1 | Status: SHIPPED | OUTPATIENT
Start: 2024-05-03

## 2024-05-03 RX ORDER — ONDANSETRON 4 MG/1
4 TABLET, ORALLY DISINTEGRATING ORAL ONCE AS NEEDED
Status: DISCONTINUED | OUTPATIENT
Start: 2024-05-03 | End: 2024-05-03 | Stop reason: HOSPADM

## 2024-05-03 RX ORDER — ONDANSETRON 4 MG/1
4 TABLET, ORALLY DISINTEGRATING ORAL EVERY 8 HOURS PRN
Qty: 30 TABLET | Refills: 1 | Status: SHIPPED | OUTPATIENT
Start: 2024-05-03

## 2024-05-03 RX ADMIN — LOSARTAN POTASSIUM 50 MG: 50 TABLET, FILM COATED ORAL at 08:17

## 2024-05-03 RX ADMIN — Medication 10 ML: at 08:17

## 2024-05-03 RX ADMIN — ONDANSETRON 4 MG: 2 INJECTION INTRAMUSCULAR; INTRAVENOUS at 11:08

## 2024-05-03 RX ADMIN — DIATRIZOATE MEGLUMINE AND DIATRIZOATE SODIUM 90 ML: 660; 100 LIQUID ORAL; RECTAL at 10:11

## 2024-05-03 RX ADMIN — SODIUM CHLORIDE, POTASSIUM CHLORIDE, SODIUM LACTATE AND CALCIUM CHLORIDE 140 ML/HR: 600; 310; 30; 20 INJECTION, SOLUTION INTRAVENOUS at 07:16

## 2024-05-03 RX ADMIN — HYDROMORPHONE HYDROCHLORIDE 0.5 MG: 1 INJECTION, SOLUTION INTRAMUSCULAR; INTRAVENOUS; SUBCUTANEOUS at 08:25

## 2024-05-03 RX ADMIN — DESVENLAFAXINE SUCCINATE 100 MG: 50 TABLET, EXTENDED RELEASE ORAL at 08:16

## 2024-05-03 RX ADMIN — ONDANSETRON 4 MG: 2 INJECTION INTRAMUSCULAR; INTRAVENOUS at 05:01

## 2024-05-03 RX ADMIN — HYDROCODONE BITARTRATE AND ACETAMINOPHEN 10 ML: 7.5; 325 SOLUTION ORAL at 05:01

## 2024-05-03 RX ADMIN — HYDROCHLOROTHIAZIDE 12.5 MG: 25 TABLET ORAL at 08:17

## 2024-05-03 RX ADMIN — FAMOTIDINE 20 MG: 10 INJECTION INTRAVENOUS at 08:17

## 2024-05-03 NOTE — SIGNIFICANT NOTE
Paged Dr. Cole regarding worsening subcutaneous emphysema around lower neck, right shoulder, and left side of face. Patient denies SOA or difficulty breathing. Order for STAT CXRAY placed per verbal telephone order.   Dr. Cole also notified of patient oxygen saturation 87% on room air after ambulation. Patient does not wear O2 at home.   XRAY paged.  HOB elevated. O2 sats stable on 2L NC. No acute distress noted.

## 2024-05-03 NOTE — PROGRESS NOTES
"Patient Care Team:  Rik Berry MD as PCP - General  Rik Berry MD as PCP - Family Medicine  Antonio Lujan MD as Consulting Physician (Pulmonary Disease)  Jovana Hayden MD as Surgeon (General Surgery)  Kenia Aguirre APRN as Nurse Practitioner (Obstetrics and Gynecology)    Qasim Birch is POD 1 from paraesophageal hernia repair.  Patient is resting comfortably does have subcu emphysema in the upper chest and lower neck location.  Pain adequately controlled.  Breathing without difficulty.  Reported last night of questionable increase in subcutaneous emphysema.  This a.m. no evidence of this.  Chest x-ray was obtained.  Patient is tolerating ice chips and sips and also clears.  She has undergone her esophagram this a.m. report pending.       Review of Systems:     Review of Systems - General ROS: negative  Respiratory ROS: no cough, shortness of breath, or wheezing  Cardiovascular ROS: no chest pain or dyspnea on exertion  Gastrointestinal ROS: no abdominal pain, change in bowel habits, or black or bloody stools    Objective     Vital Signs  BP 94/64 (BP Location: Right arm, Patient Position: Lying)   Pulse 76   Temp 96.7 °F (35.9 °C) (Oral)   Resp 16   Ht 167.3 cm (65.87\")   Wt 84.3 kg (185 lb 13.6 oz)   SpO2 95%   Breastfeeding No   BMI 30.12 kg/m²     Physical Exam:    HEENT: PERRLA and extra ocular movement intact  Respiratory: appears well, vitals normal, no respiratory distress, acyanotic, normal RR, chest clear, no wheezing, crepitations, rhonchi, normal symmetric air entry  Cardiovascular: Regular rate and rhythm, S1, S2 normal, no murmur, click, rub or gallop  GI: Soft, tender at incision sites, normal bowel sounds; no bruits, organomegaly or masses.  Abnormal shape: flat  Musculoskeletal: inspection - no abnormality  Neurologic: alert, oriented, normal speech, no focal findings or movement disorder noted     Results Review:    None  Lab Results " (last 24 hours)       Procedure Component Value Units Date/Time    POC Glucose Once [[id number]]  (Normal) Collected: 05/03/24 0822    Specimen: Blood Updated: 05/03/24 0833     Glucose 88 mg/dL      Comment: : [id number] Jovanna ContehieMeter ID: [id number]       Hemoglobin & Hematocrit, Blood [[id number]]  (Normal) Collected: 05/03/24 0500    Specimen: Blood Updated: 05/03/24 0600     Hemoglobin 13.6 g/dL      Hematocrit 40.1 %     POC Glucose Once [[id number]]  (Normal) Collected: 05/02/24 1948    Specimen: Blood Updated: 05/02/24 2000     Glucose 112 mg/dL      Comment: : [id number] Daniel HurtleyMeter ID: [id number]       POC Glucose Once [[id number]]  (Normal) Collected: 05/02/24 1707    Specimen: Blood Updated: 05/02/24 1723     Glucose 123 mg/dL      Comment: : [id number] Jovanna YadyieMeter ID: [id number]                 Medication Reviewed:   Current Facility-Administered Medications   Medication Dose Route Frequency Provider Last Rate Last Admin    acetaminophen (TYLENOL) 160 MG/5ML oral solution 325 mg  325 mg Oral Q8H Uli Marte MD   325 mg at 05/02/24 1353    desvenlafaxine (PRISTIQ) 24 hr tablet 100 mg  100 mg Oral Daily Uli Marte MD   100 mg at 05/03/24 0816    dexAMETHasone (DECADRON) injection 4 mg  4 mg Intravenous Q8H PRN Uli Marte MD        diphenhydrAMINE (BENADRYL) injection 25 mg  25 mg Intravenous Q6H PRN Uli Marte MD        famotidine (PEPCID) injection 20 mg  20 mg Intravenous Q12H Uli Marte MD   20 mg at 05/03/24 0817    losartan (COZAAR) tablet 50 mg  50 mg Oral Q24H Uli Marte MD   50 mg at 05/03/24 0817    And    hydroCHLOROthiazide tablet 12.5 mg  12.5 mg Oral Q24H Uli Marte MD   12.5 mg at 05/03/24 0817    HYDROcodone-acetaminophen (HYCET) 7.5-325 MG/15ML solution 10 mL  10 mL Oral Q4H PRN Uli Marte MD   10 mL at 05/03/24 0501    HYDROmorphone (DILAUDID) injection 0.5 mg  0.5 mg Intravenous Q2H PRN Uli Marte,  MD   0.5 mg at 05/03/24 0825    And    naloxone (NARCAN) injection 0.1 mg  0.1 mg Intravenous Q5 Min PRN Uli Marte MD        Insulin Lispro (humaLOG) injection 2-7 Units  2-7 Units Subcutaneous 4x Daily AC & at Bedtime Uli Marte MD        ketorolac (TORADOL) injection 30 mg  30 mg Intravenous Q6H PRN Uli Marte MD   30 mg at 05/02/24 2012    labetalol (NORMODYNE,TRANDATE) injection 10 mg  10 mg Intravenous Q30 Min PRN Uli Marte MD        lactated ringers infusion  140 mL/hr Intravenous Continuous Uli Marte  mL/hr at 05/03/24 0716 140 mL/hr at 05/03/24 0716    ondansetron (ZOFRAN) injection 4 mg  4 mg Intravenous Q6H Uli Marte MD   4 mg at 05/03/24 0501    simethicone (MYLICON) chewable tablet 40 mg  40 mg Oral 4x Daily PRN Uli Marte MD        sodium chloride 0.9 % flush 1-10 mL  1-10 mL Intravenous PRN Uli Marte MD        sodium chloride 0.9 % flush 10 mL  10 mL Intravenous Q12H Uli Marte MD   10 mL at 05/03/24 0817    sodium chloride 0.9 % infusion 40 mL  40 mL Intravenous PRN Uli Marte MD           Assessment & Plan  POD 1 from paraesophageal hernia repair.    Encourage incentive spirometer today and encouraged to ambulate.  Reviewed esophagram with radiologist which appeared to be within normal limits.  Will discharge home soon.  Postoperative diet instructions will be explained and then handed to the patient prior to her discharge      Uli Marte MD  05/03/24  10:27 CDT

## 2024-05-03 NOTE — PLAN OF CARE
Goal Outcome Evaluation:              Outcome Evaluation: A&O. C/o pain; see MAR. 2L NC while sleeping - does not have home CPAP with her. IVF/IV abx. NPO, ice chips. Up x1. Voiding. VSS. Safety maintained.

## 2024-05-05 NOTE — DISCHARGE SUMMARY
"  Date of Discharge:  5/5/2024    Discharge Diagnosis: Hiatal hernia [K44.9]  Gastroesophageal reflux disease, unspecified whether esophagitis present [K21.9]  Hiatal hernia with gastroesophageal reflux [K44.9, K21.9]  Status post repair of paraesophageal diaphragmatic hernia [Z98.890, Z87.19]    Presenting Problem/History of Present Illness  Hiatal hernia [K44.9]  Gastroesophageal reflux disease, unspecified whether esophagitis present [K21.9]  Hiatal hernia with gastroesophageal reflux [K44.9, K21.9]  Status post repair of paraesophageal diaphragmatic hernia [Z98.890, Z87.19]       Hospital Course  Patient is a 53 y.o. female presented with symptomatic GERD secondary to hiatal hernia.  She subsequently undergone a paraesophageal hernia repair with fundoplication.  Postoperatively the patient remained stable.  There was concern of her subcu emphysema which was expected however a chest x-ray was obtained tonight after surgery.  Patient tolerated clears and no evidence of pneumo thoraces from the chest x-ray was present.  She had her esophagram which showed no evidence of hiatal hernia or leakage from the procedure.  Patient was advanced to clears and discharged home in stable condition postop day 1.  Her instructions were specific to follow the postoperative diet and this was reviewed with the patient prior to discharge as well as restrictions with respect to weight lifting and activities.  Patient will follow-up with us in 1 week's time or as needed.    Procedures Performed  Procedure(s):  LAPAROSCOPIC HIATAL HERNIA REPAIR WITH DAVINCI ROBOT       Consults:   Consults       No orders found from 4/3/2024 to 5/3/2024.              Condition on Discharge:  Stable    Vital Signs  BP 94/64 (BP Location: Right arm, Patient Position: Lying)   Pulse 76   Temp 96.7 °F (35.9 °C) (Oral)   Resp 16   Ht 167.3 cm (65.87\")   Wt 84.3 kg (185 lb 13.6 oz)   SpO2 95%   Breastfeeding No   BMI 30.12 kg/m²     Physical " Exam:  General Appearance: alert, appears stated age, and cooperative  Lungs: clear to auscultation, respirations regular, respirations even, and respirations unlabored  Heart: regular rhythm & normal rate, normal S1, S2, no murmur, no gallop, no rub, and no click  Abdomen: normal bowel sounds, no masses, no hepatomegaly, no splenomegaly, no guarding, and no rebound tenderness, tenderness at incision sites.  Decreased crepitus noted on the chest and neck  Extremities: moves extremities well, no edema, no cyanosis, and no redness    Discharge Disposition  Home or Self Care    Discharge Medications     Discharge Medications        New Medications        Instructions Start Date   Gas Relief 80 MG chewable tablet  Generic drug: simethicone   40 mg, Oral, Every 6 Hours PRN      ondansetron ODT 4 MG disintegrating tablet  Commonly known as: ZOFRAN-ODT   4 mg, Translingual, Every 8 Hours PRN      traMADol 50 MG tablet  Commonly known as: Ultram   50 mg, Oral, Every 8 Hours PRN             Continue These Medications        Instructions Start Date   desvenlafaxine 100 MG 24 hr tablet  Commonly known as: PRISTIQ   Take 1 tablet by mouth Daily.      fexofenadine 180 MG tablet  Commonly known as: Allegra Allergy   180 mg, Oral, Daily      fish oil 1000 MG capsule capsule   2,000 mg, Oral, Daily With Breakfast      levothyroxine 75 MCG tablet  Commonly known as: SYNTHROID, LEVOTHROID   75 mcg, Oral, Daily      losartan-hydrochlorothiazide 50-12.5 MG per tablet  Commonly known as: HYZAAR   1 tablet, Oral, Daily      omeprazole 40 MG capsule  Commonly known as: priLOSEC   40 mg, Oral, Daily      pravastatin 40 MG tablet  Commonly known as: PRAVACHOL   40 mg, Oral, Nightly             Stop These Medications      acetaminophen 500 MG tablet  Commonly known as: TYLENOL     dexlansoprazole 60 MG capsule  Commonly known as: DEXILANT     Ozempic (0.25 or 0.5 MG/DOSE) 2 MG/1.5ML solution pen-injector  Generic drug: Semaglutide(0.25 or  0.5MG/DOS)              Discharge Diet:     Activity at Discharge:   Activity Instructions       Discharge Activity      1) No driving for until not using or requiring narcotics.   2) Return to school / work in one week or longer with restrictions listed below.  3) May shower / sponge bathe after 48 hours.  4) Do not lift / push / pull more than 10 pounds lbs for 4 weeks.            Follow-up Appointments  Future Appointments   Date Time Provider Department Center   5/8/2024  1:15 PM Uli Marte MD [facility] None   10/22/2024  3:15 PM Rosanne Cardoza APRN [facility]     Additional Instructions for the Follow-ups that You Need to Schedule       Discharge Follow-up with Specified Provider: To Dr. Marte office; 1 Week   As directed      To: To Dr. Marte office   Follow Up: 1 Week        Notify Physician or Go To The ED For the Following Conditions   As directed      Call office with any question or concerns    Order Comments: Call office with any question or concerns                 Test Results Pending at Discharge       Uli Marte MD  05/05/24  09:27 CDT

## 2024-05-10 ENCOUNTER — OFFICE VISIT (OUTPATIENT)
Dept: BARIATRICS/WEIGHT MGMT | Facility: CLINIC | Age: 54
End: 2024-05-10
Payer: COMMERCIAL

## 2024-05-10 VITALS
DIASTOLIC BLOOD PRESSURE: 84 MMHG | BODY MASS INDEX: 31.02 KG/M2 | HEART RATE: 93 BPM | SYSTOLIC BLOOD PRESSURE: 120 MMHG | TEMPERATURE: 97.8 F | HEIGHT: 65 IN | WEIGHT: 186.2 LBS | OXYGEN SATURATION: 93 %

## 2024-05-10 DIAGNOSIS — Z98.890 STATUS POST REPAIR OF PARAESOPHAGEAL DIAPHRAGMATIC HERNIA: Primary | ICD-10-CM

## 2024-05-10 DIAGNOSIS — Z87.19 STATUS POST REPAIR OF PARAESOPHAGEAL DIAPHRAGMATIC HERNIA: Primary | ICD-10-CM

## 2024-05-17 ENCOUNTER — TELEPHONE (OUTPATIENT)
Dept: BARIATRICS/WEIGHT MGMT | Facility: CLINIC | Age: 54
End: 2024-05-17
Payer: COMMERCIAL

## 2024-05-17 NOTE — TELEPHONE ENCOUNTER
Patient calling in regard to a concern with her hernia repair. Patient had sx 5/2/24 and has been doing good. She stated that she vomited out her car on the 15th, not that much but enough to strain when coming up. She had some pain and went on to bed, that morning she stated she was fine. Today she is just a bit sore but not to bad, but is concerned that she may have did something to her repair. The area where the hernia was located is a little harder than the rest of her stomach, where before it was softer. Patient thinks she is over thinking and its in her head. She wants to be sure she hasn't missed anything up from the day she had the vomit issue.    Please advise

## 2024-05-17 NOTE — TELEPHONE ENCOUNTER
Please advise patient to recall what she ate the day of her vomiting.  Stages could be the issue.  Also she may come in and be seen anytime during office hours.  Both are recommended by Dr. Marte

## 2024-05-28 ENCOUNTER — OFFICE VISIT (OUTPATIENT)
Dept: BARIATRICS/WEIGHT MGMT | Facility: CLINIC | Age: 54
End: 2024-05-28
Payer: COMMERCIAL

## 2024-05-28 VITALS
HEART RATE: 97 BPM | HEIGHT: 65 IN | BODY MASS INDEX: 31.02 KG/M2 | DIASTOLIC BLOOD PRESSURE: 83 MMHG | SYSTOLIC BLOOD PRESSURE: 133 MMHG | TEMPERATURE: 98.6 F | OXYGEN SATURATION: 97 % | WEIGHT: 186.2 LBS

## 2024-05-28 DIAGNOSIS — Z98.890 STATUS POST REPAIR OF PARAESOPHAGEAL DIAPHRAGMATIC HERNIA: Primary | ICD-10-CM

## 2024-05-28 DIAGNOSIS — Z87.19 STATUS POST REPAIR OF PARAESOPHAGEAL DIAPHRAGMATIC HERNIA: Primary | ICD-10-CM

## 2024-05-28 PROCEDURE — 99024 POSTOP FOLLOW-UP VISIT: CPT | Performed by: SURGERY

## 2024-05-28 NOTE — PROGRESS NOTES
"  Patient Care Team:  Rik Berry MD as PCP - General  Rik Berry MD as PCP - Family Medicine  Antonio Lujan MD as Consulting Physician (Pulmonary Disease)  Jovana Hayden MD as Surgeon (General Surgery)  Kenia Aguirre APRN as Nurse Practitioner (Obstetrics and Gynecology)    Subjective     Bre Birch is a 53 y.o. female.     Bre is post op 1 month from her hiatal hernia repair.  She is currently on her stage IV diet.  Patient is doing good however did have 1 bout of vomiting on Friday.  After vomiting she did feel better however did have abdominal pain.  The following day she was able to tolerate her foods.  She is unaware of what might of triggered the vomiting episode on that day.  She is tolerating liquids and solid foods today and denies nausea and vomiting or abdominal pain.  She stated she called the office to inform of her episode of vomiting on that Friday however did not get a return phone call back.    Review Of Systems:  General ROS: negative  Respiratory ROS: no cough, shortness of breath, or wheezing  Cardiovascular ROS: no chest pain or dyspnea on exertion  Gastrointestinal ROS: no abdominal pain, change in bowel habits, or black or bloody stools    The following portions of the patient's history were reviewed and updated as appropriate: allergies, current medications, past family history, past medical history, past social history, past surgical history, and problem list.    Objective   /83 (BP Location: Right arm, Patient Position: Sitting, Cuff Size: Adult)   Pulse 97   Temp 98.6 °F (37 °C)   Ht 165.1 cm (65\")   Wt 84.5 kg (186 lb 3.2 oz)   SpO2 97%   BMI 30.99 kg/m²       05/28/24  0834   Weight: 84.5 kg (186 lb 3.2 oz)        General Appearance:  awake, alert, oriented, in no acute distress  Abdomen:  Soft, non-tender, normal bowel sounds; no bruits, organomegaly or masses.  Abnormal shape: normal  Wounds: clean, dry, intact    ASSESSMENT/ " PLAN    Encounter Diagnoses   Name Primary?    Status post repair of paraesophageal diaphragmatic hernia Yes     Patient was recommended to create a food journal during these acute recovery phases of healing and changing of her diet to be aware of the foods that might trigger nausea and/or vomiting.  With respect to her not getting a return phone call back I apologize for our team not getting back to her.  She was also explained that our postoperative instructions manual will guide her in/with situations where she would have to or recommended to go back to stage I after vomiting or nausea.  She is still to call our office regardless if her questions have not been resolved to the instruction manual/handout.  She states she felt better the following day and had been able to tolerate her diet.  And now she is on her stage IV diet tolerating this as well.  Her weight restrictions have been now removed and she may increase her activity as tolerated.      05/28/24  09:15 CDT  Patient Care Team:  Rik Berry MD as PCP - General  Rik Berry MD as PCP - Family Medicine  Antonio Lujan MD as Consulting Physician (Pulmonary Disease)  Jovana Hayden MD as Surgeon (General Surgery)  Kenia Aguirre APRN as Nurse Practitioner (Obstetrics and Gynecology)  Uli Marte MD FACS

## 2024-06-28 ENCOUNTER — OFFICE VISIT (OUTPATIENT)
Dept: BARIATRICS/WEIGHT MGMT | Facility: CLINIC | Age: 54
End: 2024-06-28
Payer: COMMERCIAL

## 2024-06-28 VITALS
SYSTOLIC BLOOD PRESSURE: 123 MMHG | OXYGEN SATURATION: 97 % | HEIGHT: 65 IN | TEMPERATURE: 98.7 F | DIASTOLIC BLOOD PRESSURE: 84 MMHG | HEART RATE: 93 BPM | WEIGHT: 188.2 LBS | BODY MASS INDEX: 31.36 KG/M2

## 2024-06-28 DIAGNOSIS — M62.08 DIASTASIS RECTI: Primary | ICD-10-CM

## 2024-07-01 NOTE — PROGRESS NOTES
General Surgery   History and Physical     Referring Provider: No ref. provider found    Patient Care Team:  Rik Berry MD as PCP - General  Rik Berry MD as PCP - Family Medicine  Antonio Lujan MD as Consulting Physician (Pulmonary Disease)  Jovana Hayden MD as Surgeon (General Surgery)  Kenia Aguirre APRN as Nurse Practitioner (Obstetrics and Gynecology)    Chief complaint: Concerns about hernia     Subjective      History of Present Illness   The patient is a 53 y.o. female who presents for evaluation status post hiatal hernia repair.    The patient underwent a hiatal hernia repair on 05/02/2023. Approximately a week post-surgery, she experienced vomiting on a car ride causing her to pull over. She vomited twice on the side of the road and experienced pain that night. She sought medical attention from Dr. Marte due to being concerned about a potential injury. Her dietary intake is unaffected, and her incisions have consistently healed. She denies experiencing any pain. However, she has noticed an abnormality in her abdomen. She is aware of the need for weight loss.       Review of Systems    Review of Systems - General ROS: negative  ENT ROS: negative  Respiratory ROS: negative  Cardiovascular ROS: negative  Gastrointestinal ROS: positive for - lump in mid abdomen   Genito-Urinary ROS: negative  Dermatological ROS: negative     History  Past Medical History:   Diagnosis Date    Anxiety     GERD (gastroesophageal reflux disease)     Hiatal hernia     Hyperlipidemia     Hypothyroid     Obstructive sleep apnea    ,   Past Surgical History:   Procedure Laterality Date    BREAST BIOPSY Left 2 yrs ago    CHOLECYSTECTOMY      COLONOSCOPY      ENDOMETRIAL ABLATION      ENDOSCOPY      FOOT ARTHRODESIS, MODIFIED DOUGLASS      HERNIA REPAIR      HIATAL HERNIA REPAIR N/A 5/2/2024    Procedure: LAPAROSCOPIC HIATAL HERNIA REPAIR WITH DAVINCI ROBOT;  Surgeon: Uli Marte MD;   Location:  [facility] OR;  [facility];  Laterality: N/A;    TUBAL ABDOMINAL LIGATION     ,   Family History   Problem Relation Age of Onset    Hypertension Father     Stroke Mother     Hypertension Brother     No Known Problems Daughter     No Known Problems Paternal Grandmother     No Known Problems Maternal Grandmother     Cancer Maternal Grandfather     No Known Problems Maternal Aunt     No Known Problems Paternal Aunt     BRCA 1/2 Neg Hx     Breast cancer Neg Hx     Colon cancer Neg Hx     Endometrial cancer Neg Hx     Ovarian cancer Neg Hx    ,   Social History     Tobacco Use    Smoking status: Never    Smokeless tobacco: Never   Vaping Use    Vaping status: Never Used   Substance Use Topics    Alcohol use: No    Drug use: No   , (Not in a hospital admission)   and Allergies:  Bactrim [sulfamethoxazole-trimethoprim], Azithromycin, and Macrolides and ketolides    Current Outpatient Medications:     desvenlafaxine (PRISTIQ) 100 MG 24 hr tablet, Take 1 tablet by mouth Daily., Disp: , Rfl:     fexofenadine (Allegra Allergy) 180 MG tablet, Take 1 tablet by mouth Daily., Disp: 30 tablet, Rfl: 0    levothyroxine (SYNTHROID, LEVOTHROID) 75 MCG tablet, Take 1 tablet by mouth Daily., Disp: , Rfl:     losartan-hydrochlorothiazide (HYZAAR) 50-12.5 MG per tablet, Take 1 tablet by mouth Daily., Disp: , Rfl:     Omega-3 Fatty Acids (FISH OIL) 1000 MG capsule capsule, Take 2 capsules by mouth Daily With Breakfast., Disp: , Rfl:     omeprazole (priLOSEC) 40 MG capsule, Take 1 capsule by mouth Daily., Disp: , Rfl:     pravastatin (PRAVACHOL) 40 MG tablet, Take 1 tablet by mouth Every Night., Disp: , Rfl:     Objective     Vital Signs        Physical Exam:  Physical Exam    Physical Exam  Vitals reviewed.   Constitutional:       Appearance: She is obese.   Cardiovascular:      Rate and Rhythm: Normal rate and regular rhythm.   Pulmonary:      Effort: Pulmonary effort is normal.   Abdominal:      General: Bowel  sounds are normal.      Palpations: Abdomen is soft.      Comments: Area appears to be consistent with diastasis recti    Musculoskeletal:         General: Normal range of motion.   Skin:     General: Skin is warm and dry.   Neurological:      Mental Status: She is alert and oriented to person, place, and time.   Psychiatric:         Mood and Affect: Mood normal.         Behavior: Behavior normal.          Results Review:  Results      Lab Results (last 24 hours)       ** No results found for the last 24 hours. **          Imaging Results (Last 24 Hours)       ** No results found for the last 24 hours. **            ASSESSMENT/PLAN  Assessment & Plan  1. Post-hiatal hernia surgery status.  The patient's incisions are in good condition.     Diagnosis Plan   1. Diastasis recti             * No active hospital problems. *    Overall patient's exam is normal for postoperative hiatal hernia repair.  Patient is tolerating all foods and denies any abdominal pain.  Her main complaint today is that there is a large bulging area in the midportion of her abdomen.  After examination I explained to patient that this is likely diastasis recti.  She verbalizes understanding and plans to do some core workouts to assist with this otherwise we will continue her post hernia repair recommendations and follow-up as needed.    VINAY Desir  07/01/24  08:37 CDT          Patient or patient representative verbalized consent for the use of Ambient Listening during the visit with  VINAY Desir for chart documentation. 7/1/2024  08:37 CDT

## 2024-08-14 ENCOUNTER — OFFICE VISIT (OUTPATIENT)
Dept: GASTROENTEROLOGY | Age: 54
End: 2024-08-14
Payer: COMMERCIAL

## 2024-08-14 VITALS
DIASTOLIC BLOOD PRESSURE: 80 MMHG | BODY MASS INDEX: 31.49 KG/M2 | SYSTOLIC BLOOD PRESSURE: 120 MMHG | HEART RATE: 120 BPM | HEIGHT: 65 IN | OXYGEN SATURATION: 98 % | WEIGHT: 189 LBS

## 2024-08-14 DIAGNOSIS — R19.7 DIARRHEA, UNSPECIFIED TYPE: Primary | ICD-10-CM

## 2024-08-14 DIAGNOSIS — R10.9 ABDOMINAL CRAMPING: ICD-10-CM

## 2024-08-14 PROCEDURE — 99214 OFFICE O/P EST MOD 30 MIN: CPT | Performed by: NURSE PRACTITIONER

## 2024-08-14 RX ORDER — DICYCLOMINE HYDROCHLORIDE 10 MG/1
10 CAPSULE ORAL
Qty: 120 CAPSULE | Refills: 3 | Status: SHIPPED | OUTPATIENT
Start: 2024-08-14

## 2024-08-14 NOTE — PROGRESS NOTES
Hospitalist Admission NoteNAME: Katja Taylor :  1951 MRN:  [id number] Date/Time:  2019 11:16 PM 
 
Patient PCP: Evie Salter MD 
______________________________________________________________________ Given the patient's current clinical presentation, I have a high level of concern for decompensation if discharged from the emergency department. Complex decision making was performed, which includes reviewing the patient's available past medical records, laboratory results, and x-ray films. My assessment of this patient's clinical condition and my plan of care is as follows. Assessment / Plan: 
 
Asthma exacerbation likely secondary to viral bronchitis 
-blood gas 
-scheduled and prn duo nebs -IV steroids 
-Peak flow 
-Suppl oxygen as needed DM POA 
-hba1c 
-restart metformin, check correct dose in am 
-ISS 
HTN POA ? PA 
-resume hctz 
-will resume sotalol once dose confirmed H/O breast CA Code Status: Full Surrogate Decision Maker: Mother and daughter DVT Prophylaxis: Lovenox GI Prophylaxis: not indicated Baseline: independent Subjective: CHIEF COMPLAINT: SOB and wheezing HISTORY OF PRESENT ILLNESS:    
Katja Taylor is a 74 y.o. female with PMHx significant for asthma, HTN, DM, PAF, breast CA presented to [facility] ED with c/o of progressively worsening cough and wheezing for 2 days. Says she has minimal sore throat on day and it imporved on its own. Denies sick contacts, recent travel or hospitalization, denies smoking, says she has been coughing up phlegm and having chest tightness. Her inhaler has not been helping much and ran out of it. In ER she was febrile but influenza testing was neg. normal WBC and CXR neg for acute pathology. She received multiple nebs but continue to wheeze and had minimal relief. We donot have any medications in our system and pt doesn't remember all home meds.  Says he is on aspirin, metformin, Subjective:     Patient ID: Elvia Martel is a 53 y.o. female  PCP: Babar Gamez MD  Referring Provider: No ref. provider found    HPI  Patient presents to the office today with the following complaints: Diarrhea      Patient seen in the office today with complaints of frequent diarrhea bowel movements  Reports she is waking up at night to have a bowel movement    Reports she is having up to 8 bowel movements per day   This started several months ago but has gotten worse, but now this is interfering with her job and daily activities  Reports abd cramping as well          Assessment:     1. Diarrhea, unspecified type  2. Abdominal cramping       Review of Systems   Constitutional:  Negative for activity change, appetite change, fatigue, fever and unexpected weight change.   HENT:  Negative for trouble swallowing.    Respiratory:  Negative for cough, choking and shortness of breath.    Cardiovascular:  Negative for chest pain.   Gastrointestinal:  Positive for abdominal pain (abd cramping) and diarrhea. Negative for abdominal distention, anal bleeding, blood in stool, constipation, nausea, rectal pain and vomiting.   Allergic/Immunologic: Negative for food allergies.   All other systems reviewed and are negative.      Plan:   Bentyl 10 mg QID RX provided   Schedule Colonoscopy  Instruct on bowel prep.   Nothing to eat or drink after midnight the day of the exam.  Unable to drive for 24 hours after the procedure.   No aspirin or nonsteroidal anti-inflammatories for 5 days before procedure.  I have discussed the benefits, alternatives, and risks (including bleeding, perforation and death)  for pursuing Endoscopy (EGD/Colonscopy/EUS/ERCP) with the patient and they are willing to continue. We also discussed the need for anesthesia, IV access, proper dietary changes, medication changes if necessary, and need for bowel prep (if ordered) prior to their Endoscopic procedure.  They are aware they must have someone  hctz, sotalol( but doesn't remember dose) We were asked to admit for work up and evaluation of the above problems. Past Medical History:  
Diagnosis Date  Asthma  Atrial fibrillation ([facility] [place] [address]  Diabetes ([facility] [place] [address]  Hypertension No past surgical history on file. Social History Tobacco Use  Smoking status: Not on file Substance Use Topics  Alcohol use: Not on file Family history + HTN No Known Allergies Prior to Admission medications Not on File REVIEW OF SYSTEMS:    
I am not able to complete the review of systems because: The patient is intubated and sedated The patient has altered mental status due to his acute medical problems The patient has baseline aphasia from prior stroke(s) The patient has baseline dementia and is not reliable historian The patient is in acute medical distress and unable to provide information Total of 12 systems reviewed as follows:   
   POSITIVE= underlined text  Negative = text not underlined General:  fever, chills, sweats, generalized weakness, weight loss/gain,  
   loss of appetite Eyes:    blurred vision, eye pain, loss of vision, double vision ENT:    rhinorrhea, pharyngitis Respiratory:   cough, sputum production, SOB, ROJAS, wheezing, pleuritic pain  
Cardiology:   chest pain, palpitations, orthopnea, PND, edema, syncope Gastrointestinal:  abdominal pain , N/V, diarrhea, dysphagia, constipation, bleeding Genitourinary:  frequency, urgency, dysuria, hematuria, incontinence Muskuloskeletal :  arthralgia, myalgia, back pain Hematology:  easy bruising, nose or gum bleeding, lymphadenopathy Dermatological: rash, ulceration, pruritis, color change / jaundice Endocrine:   hot flashes or polydipsia Neurological:  headache, dizziness, confusion, focal weakness, paresthesia, Speech difficulties, memory loss, gait difficulty Psychological: Feelings of anxiety, depression, agitation Objective: VITALS:   
Visit Vitals /87 Pulse 83 Temp 98.4 °F (36.9 °C) Resp 15 Ht 5' 7\" (1.702 m) SpO2 94% PHYSICAL EXAM: exam performed with assistance of pt's nurse General:    Alert, cooperative, no distress, appears stated age. HEENT: Atraumatic Lungs:   B/l exp wheeze. Chest wall:  No tenderness  No Accessory muscle use. Heart:   Regular  rhythm,  No  murmur   No edema Abdomen:   Soft, non-tender. Not distended. Bowel sounds normal 
Skin:     Not pale. Not Jaundiced  No rashes Psych:  Not depressed. Not anxious or agitated. Neurologic: [place] facial asymmetry. No aphasia or slurred speech. Symmetrical strength 
_______________________________________________________________________ Care Plan discussed with: 
  Comments Patient x Family RN Care Manager Consultant:     
_______________________________________________________________________ Expected  Disposition:  
Home with Family HH/PT/OT/RN   
SNF/LTC   
PATIENCE   
________________________________________________________________________ TOTAL TIME:  45 Minutes Critical Care Provided     Minutes non procedure based Comments  
 x Reviewed previous records  
>50% of visit spent in counseling and coordination of care  Discussion with patient and/or family and questions answered 
  
 
________________________________________________________________________ Signed: Cristian Talavera MD 
 
Procedures: see electronic medical records for all procedures/Xrays and details which were not copied into this note but were reviewed prior to creation of Plan. LAB DATA REVIEWED:   
Recent Results (from the past 24 hour(s)) CBC WITH AUTOMATED DIFF Collection Time: 01/13/19  8:45 PM  
Result Value Ref Range WBC 7.0 3.6 - 11.0 K/uL  
 RBC 4.48 3.80 - 5.20 M/uL  
 HGB 12.6 11.5 - 16.0 g/dL HCT 38.8 35.0 - 47.0 %  MCV 86.6 80.0 - 99.0 FL  
 MCH 28.1 26.0 - 34.0 PG  
 MCHC 32.5 30.0 - 36.5 g/dL  
 RDW 12.9 11.5 - 14.5 % PLATELET 081 580 - 069 K/uL MPV 11.1 8.9 - 12.9 FL  
 NRBC 0.0 0  WBC ABSOLUTE NRBC 0.00 0.00 - 0.01 K/uL NEUTROPHILS 66 32 - 75 % LYMPHOCYTES 18 12 - 49 % MONOCYTES 14 (H) 5 - 13 % EOSINOPHILS 2 0 - 7 % BASOPHILS 0 0 - 1 % IMMATURE GRANULOCYTES 0 0.0 - 0.5 % ABS. NEUTROPHILS 4.6 1.8 - 8.0 K/UL  
 ABS. LYMPHOCYTES 1.2 0.8 - 3.5 K/UL  
 ABS. MONOCYTES 0.9 0.0 - 1.0 K/UL  
 ABS. EOSINOPHILS 0.1 0.0 - 0.4 K/UL  
 ABS. BASOPHILS 0.0 0.0 - 0.1 K/UL  
 ABS. IMM. GRANS. 0.0 0.00 - 0.04 K/UL  
 DF AUTOMATED METABOLIC PANEL, COMPREHENSIVE Collection Time: 01/13/19  8:45 PM  
Result Value Ref Range Sodium 135 (L) 136 - 145 mmol/L Potassium 3.7 3.5 - 5.1 mmol/L Chloride 97 97 - 108 mmol/L  
 CO2 29 21 - 32 mmol/L Anion gap 9 5 - 15 mmol/L Glucose 189 (H) 65 - 100 mg/dL BUN 9 6 - 20 MG/DL Creatinine 0.90 0.55 - 1.02 MG/DL  
 BUN/Creatinine ratio 10 (L) 12 - 20 GFR est AA >60 >60 ml/min/1.73m2 GFR est non-AA >60 >60 ml/min/1.73m2 Calcium 9.5 8.5 - 10.1 MG/DL Bilirubin, total 0.9 0.2 - 1.0 MG/DL  
 ALT (SGPT) 7 (L) 12 - 78 U/L  
 AST (SGOT) 25 15 - 37 U/L Alk. phosphatase 107 45 - 117 U/L Protein, total 8.3 (H) 6.4 - 8.2 g/dL Albumin 3.5 3.5 - 5.0 g/dL Globulin 4.8 (H) 2.0 - 4.0 g/dL A-G Ratio 0.7 (L) 1.1 - 2.2 INFLUENZA A & B AG (RAPID TEST) Collection Time: 01/13/19  8:45 PM  
Result Value Ref Range Influenza A Antigen NEGATIVE  NEG Influenza B Antigen NEGATIVE  NEG    
LACTIC ACID Collection Time: 01/13/19  8:45 PM  
Result Value Ref Range Lactic acid 1.0 0.4 - 2.0 MMOL/L  
BLOOD GAS, ARTERIAL Collection Time: 01/13/19 10:50 PM  
Result Value Ref Range pH 7.38 7.35 - 7.45    
 PCO2 48 (H) 35.0 - 45.0 mmHg PO2 64 (L) 80 - 100 mmHg O2 SAT 92 92 - 97 %  BICARBONATE 28 (H) 22 - 26 mmol/L  
 BASE EXCESS 1.5 mmol/L  
 O2 METHOD ROOM AIR    
 Sample source ARTERIAL    
 SITE RIGHT RADIAL  ANDREI'S TEST YES

## 2024-08-14 NOTE — PATIENT INSTRUCTIONS
When should you call your doctor?   You have questions or concerns.     You don't understand how to prepare for your procedure.     You are having trouble with the bowel prep.     You become ill before the procedure (such as fever, flu, or a cold).     You need to reschedule or have changed your mind about having the procedure.   Where can you learn more?  Go to https://www.[employer].net/patientEd and enter C315 to learn more about \"Colonoscopy: Before Your Procedure.\"  Current as of: May 4, 2022               Content Version: 13.5  © 0476-1126 [employer].   Care instructions adapted under license by [employer]. If you have questions about a medical condition or this instruction, always ask your healthcare professional. [employer] disclaims any warranty or liability for your use of this information.

## 2024-08-20 ASSESSMENT — ENCOUNTER SYMPTOMS
COUGH: 0
ABDOMINAL PAIN: 1
BLOOD IN STOOL: 0
ABDOMINAL DISTENTION: 0
DIARRHEA: 1
TROUBLE SWALLOWING: 0
VOMITING: 0
RECTAL PAIN: 0
CHOKING: 0
CONSTIPATION: 0
SHORTNESS OF BREATH: 0
NAUSEA: 0
ANAL BLEEDING: 0

## 2024-09-18 ENCOUNTER — TELEPHONE (OUTPATIENT)
Dept: GASTROENTEROLOGY | Age: 54
End: 2024-09-18

## 2024-09-23 ENCOUNTER — APPOINTMENT (OUTPATIENT)
Dept: OPERATING ROOM | Age: 54
End: 2024-09-23
Attending: INTERNAL MEDICINE

## 2024-09-23 ENCOUNTER — HOSPITAL ENCOUNTER (OUTPATIENT)
Age: 54
Setting detail: SPECIMEN
Discharge: HOME | End: 2024-09-23
Payer: COMMERCIAL

## 2024-09-23 ENCOUNTER — ANESTHESIA EVENT (OUTPATIENT)
Dept: OPERATING ROOM | Age: 54
End: 2024-09-23

## 2024-09-23 ENCOUNTER — HOSPITAL ENCOUNTER (OUTPATIENT)
Age: 54
Setting detail: OUTPATIENT SURGERY
Discharge: HOME | End: 2024-09-23
Attending: INTERNAL MEDICINE | Admitting: INTERNAL MEDICINE

## 2024-09-23 ENCOUNTER — ANESTHESIA (OUTPATIENT)
Dept: OPERATING ROOM | Age: 54
End: 2024-09-23

## 2024-09-23 VITALS
OXYGEN SATURATION: 94 % | BODY MASS INDEX: 31.49 KG/M2 | SYSTOLIC BLOOD PRESSURE: 109 MMHG | DIASTOLIC BLOOD PRESSURE: 68 MMHG | HEART RATE: 74 BPM | RESPIRATION RATE: 16 BRPM | HEIGHT: 65 IN | TEMPERATURE: 97.2 F | WEIGHT: 189 LBS

## 2024-09-23 DIAGNOSIS — R10.9 ABDOMINAL CRAMPING: ICD-10-CM

## 2024-09-23 DIAGNOSIS — R19.7 DIARRHEA, UNSPECIFIED TYPE: ICD-10-CM

## 2024-09-23 PROCEDURE — G8918 PT W/O PREOP ORDER IV AB PRO: HCPCS

## 2024-09-23 PROCEDURE — 88305 TISSUE EXAM BY PATHOLOGIST: CPT

## 2024-09-23 PROCEDURE — 45380 COLONOSCOPY AND BIOPSY: CPT

## 2024-09-23 PROCEDURE — G8907 PT DOC NO EVENTS ON DISCHARG: HCPCS

## 2024-09-23 RX ORDER — MONTELUKAST SODIUM 4 MG/1
1 TABLET, CHEWABLE ORAL 2 TIMES DAILY
Qty: 60 TABLET | Refills: 3 | Status: SHIPPED | OUTPATIENT
Start: 2024-09-23

## 2024-09-23 RX ORDER — SODIUM CHLORIDE, SODIUM LACTATE, POTASSIUM CHLORIDE, CALCIUM CHLORIDE 600; 310; 30; 20 MG/100ML; MG/100ML; MG/100ML; MG/100ML
INJECTION, SOLUTION INTRAVENOUS CONTINUOUS
Status: DISCONTINUED | OUTPATIENT
Start: 2024-09-23 | End: 2024-09-23 | Stop reason: HOSPADM

## 2024-09-23 RX ORDER — LIDOCAINE HYDROCHLORIDE 10 MG/ML
INJECTION, SOLUTION EPIDURAL; INFILTRATION; INTRACAUDAL; PERINEURAL
Status: DISCONTINUED | OUTPATIENT
Start: 2024-09-23 | End: 2024-09-23 | Stop reason: SDUPTHER

## 2024-09-23 RX ORDER — PROPOFOL 10 MG/ML
INJECTION, EMULSION INTRAVENOUS
Status: DISCONTINUED | OUTPATIENT
Start: 2024-09-23 | End: 2024-09-23 | Stop reason: SDUPTHER

## 2024-09-23 RX ORDER — SODIUM CHLORIDE, SODIUM LACTATE, POTASSIUM CHLORIDE, CALCIUM CHLORIDE 600; 310; 30; 20 MG/100ML; MG/100ML; MG/100ML; MG/100ML
INJECTION, SOLUTION INTRAVENOUS CONTINUOUS
Status: CANCELLED | OUTPATIENT
Start: 2024-09-23

## 2024-09-23 RX ADMIN — PROPOFOL 300 MG: 10 INJECTION, EMULSION INTRAVENOUS at 11:24

## 2024-09-23 RX ADMIN — SODIUM CHLORIDE, SODIUM LACTATE, POTASSIUM CHLORIDE, CALCIUM CHLORIDE: 600; 310; 30; 20 INJECTION, SOLUTION INTRAVENOUS at 10:15

## 2024-09-23 RX ADMIN — LIDOCAINE HYDROCHLORIDE 50 MG: 10 INJECTION, SOLUTION EPIDURAL; INFILTRATION; INTRACAUDAL; PERINEURAL at 11:24

## 2024-09-23 ASSESSMENT — PAIN - FUNCTIONAL ASSESSMENT
PAIN_FUNCTIONAL_ASSESSMENT: NONE - DENIES PAIN

## 2024-12-19 ENCOUNTER — TELEPHONE (OUTPATIENT)
Dept: GASTROENTEROLOGY | Age: 54
End: 2024-12-19

## 2024-12-19 NOTE — TELEPHONE ENCOUNTER
We may have talked about her having a diastasis recti (a separation of the abd wall).  This is not consider a medical issue it is considered cosmetic, she may benefit from exercises or physical therapy.

## 2024-12-19 NOTE — TELEPHONE ENCOUNTER
12- Patient called stated that she has seen CT APRN.  Stated that she was told that her abdomen is .  Mentioned that according to what she has found that she might benefit from Physical Therapy.  Questioned if that would be an option and if so could a referral be placed?        Called Notified patient that her message was received and that I would forward to CT APRN for any recommendations. Questions as to who had told her that her abdomen ?     She stated that Parvez COOLEY had told at her last office visit.  Complains that she looks like she is pregnant as that right above her belly button sticks out.  Mentioned that its really bad if she tries to lay backwards.       Routed to CT APRN

## 2024-12-19 NOTE — TELEPHONE ENCOUNTER
12- Called and notified patient of recommendation as per CT APRN     She stated that it has gotten worse but thought that she might at least try Physical Therapy.     Advised that I would send a message back to CT APRN and once the referral is put in that I would call her back and let her know where it was placed to    Routed to CT APRN

## 2024-12-20 DIAGNOSIS — M62.08 DIASTASIS RECTI: Primary | ICD-10-CM

## 2024-12-20 NOTE — TELEPHONE ENCOUNTER
12- Called the patient lvm that the order has been placed for PT at [facility]     Sent my chart message to the patient

## 2025-01-02 RX ORDER — OMEPRAZOLE 40 MG/1
CAPSULE, DELAYED RELEASE ORAL
Qty: 90 CAPSULE | Refills: 3 | Status: SHIPPED | OUTPATIENT
Start: 2025-01-02

## 2025-01-06 ENCOUNTER — OFFICE VISIT (OUTPATIENT)
Dept: SURGERY | Facility: CLINIC | Age: 55
End: 2025-01-06
Payer: COMMERCIAL

## 2025-01-06 VITALS
BODY MASS INDEX: 30.99 KG/M2 | WEIGHT: 186 LBS | DIASTOLIC BLOOD PRESSURE: 82 MMHG | OXYGEN SATURATION: 96 % | SYSTOLIC BLOOD PRESSURE: 135 MMHG | HEART RATE: 104 BPM | HEIGHT: 65 IN

## 2025-01-06 DIAGNOSIS — Z12.31 SCREENING MAMMOGRAM FOR BREAST CANCER: ICD-10-CM

## 2025-01-06 DIAGNOSIS — M62.08 DIASTASIS RECTI: ICD-10-CM

## 2025-01-06 DIAGNOSIS — E66.811 CLASS 1 OBESITY DUE TO EXCESS CALORIES WITH BODY MASS INDEX (BMI) OF 30.0 TO 30.9 IN ADULT, UNSPECIFIED WHETHER SERIOUS COMORBIDITY PRESENT: ICD-10-CM

## 2025-01-06 DIAGNOSIS — N60.11 FIBROCYSTIC DISEASE OF BOTH BREASTS: Primary | ICD-10-CM

## 2025-01-06 DIAGNOSIS — E66.09 CLASS 1 OBESITY DUE TO EXCESS CALORIES WITH BODY MASS INDEX (BMI) OF 30.0 TO 30.9 IN ADULT, UNSPECIFIED WHETHER SERIOUS COMORBIDITY PRESENT: ICD-10-CM

## 2025-01-06 DIAGNOSIS — N60.12 FIBROCYSTIC DISEASE OF BOTH BREASTS: Primary | ICD-10-CM

## 2025-01-06 DIAGNOSIS — N60.01 BENIGN BREAST CYST IN FEMALE, RIGHT: ICD-10-CM

## 2025-01-06 RX ORDER — MULTIPLE VITAMINS W/ MINERALS TAB 9MG-400MCG
1 TAB ORAL DAILY
COMMUNITY

## 2025-01-06 RX ORDER — DESVENLAFAXINE 50 MG/1
50 TABLET, FILM COATED, EXTENDED RELEASE ORAL DAILY
COMMUNITY

## 2025-01-06 NOTE — PROGRESS NOTES
Office Established Patient Note:     Referring Provider: No ref. provider found    Chief Complaint   Patient presents with    Cyst     breast       Subjective .     History of present illness:   History of Present Illness  The patient presents for evaluation of a cyst in her right breast and diastasis recti.    She has a history of recurrent cysts, which are typically managed through aspiration. However, she developed a particularly painful cyst around [place] that disrupted her sleep. She reports that the cyst, located in her right breast, has slightly enlarged. She has not undergone a mammogram since November 2023, despite being advised to have one every six months. She also admits to not having seen a gynecologist recently. She reports no nipple discharge.    Additionally, she reports an increase in the size of her diastasis recti, which she describes as giving her the appearance of being pregnant. She has scheduled physical therapy sessions to address this issue. She initially suspected the presence of a hernia due to associated shortness of breath but now attributes this symptom to her overweight status.       History  Past Medical History:   Diagnosis Date    Anxiety     GERD (gastroesophageal reflux disease)     Hiatal hernia     Hyperlipidemia     Hypothyroid     Obstructive sleep apnea    ,   Past Surgical History:   Procedure Laterality Date    BREAST BIOPSY Left 2 yrs ago    CHOLECYSTECTOMY      COLONOSCOPY      ENDOMETRIAL ABLATION      ENDOSCOPY      FOOT ARTHRODESIS, MODIFIED DOUGLASS      HERNIA REPAIR      HIATAL HERNIA REPAIR N/A 5/2/2024    Procedure: LAPAROSCOPIC HIATAL HERNIA REPAIR WITH DAVINCI ROBOT;  Surgeon: Uli Marte MD;  Location: [facility];  Service: Robotics - DaVinci;  Laterality: N/A;    TUBAL ABDOMINAL LIGATION     ,   Family History   Problem Relation Age of Onset    Hypertension Father     Stroke Mother     Hypertension Brother     No Known Problems Daughter     No Known  "Problems Paternal Grandmother     No Known Problems Maternal Grandmother     Cancer Maternal Grandfather     No Known Problems Maternal Aunt     No Known Problems Paternal Aunt     BRCA 1/2 Neg Hx     Breast cancer Neg Hx     Colon cancer Neg Hx     Endometrial cancer Neg Hx     Ovarian cancer Neg Hx    ,   Social History     Tobacco Use    Smoking status: Never    Smokeless tobacco: Never   Vaping Use    Vaping status: Never Used   Substance Use Topics    Alcohol use: No    Drug use: No   , (Not in a hospital admission)   and Allergies:  Bactrim [sulfamethoxazole-trimethoprim], Azithromycin, and Macrolides and ketolides    Current Outpatient Medications:     desvenlafaxine (PRISTIQ) 50 MG 24 hr tablet, Take 1 tablet by mouth Daily., Disp: , Rfl:     fexofenadine (Allegra Allergy) 180 MG tablet, Take 1 tablet by mouth Daily., Disp: 30 tablet, Rfl: 0    levothyroxine (SYNTHROID, LEVOTHROID) 75 MCG tablet, Take 1 tablet by mouth Daily., Disp: , Rfl:     losartan-hydrochlorothiazide (HYZAAR) 50-12.5 MG per tablet, Take 1 tablet by mouth Daily., Disp: , Rfl:     multivitamin with minerals tablet tablet, Take 1 tablet by mouth Daily., Disp: , Rfl:     Omega-3 Fatty Acids (FISH OIL) 1000 MG capsule capsule, Take 2 capsules by mouth Daily With Breakfast., Disp: , Rfl:     omeprazole (priLOSEC) 40 MG capsule, Take 1 capsule by mouth Daily., Disp: , Rfl:     POTASSIUM GLUCONATE PO, Take  by mouth., Disp: , Rfl:     pravastatin (PRAVACHOL) 40 MG tablet, Take 1 tablet by mouth Every Night., Disp: , Rfl:   No current facility-administered medications for this visit.      Objective     Vital Signs   /82   Pulse 104   Ht 165.1 cm (65\")   Wt 84.4 kg (186 lb)   SpO2 96%   BMI 30.95 kg/m²      Physical Exam:  General appearance - alert, well appearing, and in no distress  Mental status - alert, oriented to person, place, and time  Eyes - pupils equal and reactive, extraocular eye movements intact  Neck - supple, no " significant adenopathy  Abdomen - soft, nontender, nondistended, no masses or organomegaly. + diastasis.   Neurological - alert, oriented, normal speech, no focal findings or movement disorder noted  Breast Exam: Bilateral breasts without obvious deformities. Bilateral nipples everted. Patient examined in the supine position with the ipsilateral arm above the head. Right breast cyst palpable; mobile and soft. No concerning palpable masses bilaterally. No nipple discharge bilaterally. No palpable axillary nor supraclavicular adenopathy bilaterally.   Physical Exam     Results Review:    The following data was reviewed by: Esthela Cole MD on 01/06/2025:  None   Results         Assessment & Plan       Diagnoses and all orders for this visit:    1. Fibrocystic disease of both breasts (Primary)  -     Cancel: Mammo Screening Digital Tomosynthesis Bilateral With CAD; Future  -     US Breast Right Limited; Future    2. Screening mammogram for breast cancer  -     Cancel: Mammo Screening Digital Tomosynthesis Bilateral With CAD; Future  -     US Breast Right Limited; Future    3. Benign breast cyst in female, right  -     Cancel: Mammo Screening Digital Tomosynthesis Bilateral With CAD; Future  -     US Breast Right Limited; Future    4. Diastasis recti    5. Class 1 obesity due to excess calories with body mass index (BMI) of 30.0 to 30.9 in adult, unspecified whether serious comorbidity present       Assessment & Plan  1. Cyst in the right breast.  She reports a history of recurrent cysts, with the most recent one causing significant pain and discomfort. She has not had a mammogram since November 2023 and is overdue for her 6-month follow-up. A bilateral screening mammogram and a right breast ultrasound have been ordered to evaluate the area of concern. The results will be communicated to her upon receipt.    2. Diastasis recti.  She reports a worsening of her diastasis recti, which is causing her discomfort and  affecting her breathing. She has already set up physical therapy to start this week. It was discussed that insurance companies often consider this condition cosmetic unless accompanied by a hernia, which may affect coverage for surgical intervention.    Follow-up  The patient will follow up in 6 months.    PROCEDURE  The patient has a history of cyst aspiration.       BMI is >= 30 and <35. (Class 1 Obesity). The following options were offered after discussion;: weight loss educational material (shared in after visit summary)      Esthela Cole MD  01/07/25  14:53 CST    Patient or patient representative verbalized consent for the use of Ambient Listening during the visit with  Esthela Cole MD for chart documentation. 1/7/2025  14:54 CST

## 2025-01-07 ENCOUNTER — HOSPITAL ENCOUNTER (OUTPATIENT)
Dept: MAMMOGRAPHY | Facility: HOSPITAL | Age: 55
Discharge: HOME | End: 2025-01-07
Payer: COMMERCIAL

## 2025-01-07 ENCOUNTER — HOSPITAL ENCOUNTER (OUTPATIENT)
Dept: ULTRASOUND IMAGING | Facility: HOSPITAL | Age: 55
Discharge: HOME | End: 2025-01-07
Payer: COMMERCIAL

## 2025-01-07 ENCOUNTER — TELEPHONE (OUTPATIENT)
Dept: SURGERY | Facility: CLINIC | Age: 55
End: 2025-01-07
Payer: COMMERCIAL

## 2025-01-07 DIAGNOSIS — Z12.31 SCREENING MAMMOGRAM FOR BREAST CANCER: ICD-10-CM

## 2025-01-07 DIAGNOSIS — Z87.898 HISTORY OF LUMP OF RIGHT BREAST: ICD-10-CM

## 2025-01-07 DIAGNOSIS — N60.01 BENIGN BREAST CYST IN FEMALE, RIGHT: ICD-10-CM

## 2025-01-07 DIAGNOSIS — N60.11 FIBROCYSTIC DISEASE OF BOTH BREASTS: ICD-10-CM

## 2025-01-07 DIAGNOSIS — N60.12 FIBROCYSTIC DISEASE OF BOTH BREASTS: ICD-10-CM

## 2025-01-07 LAB
NCCN CRITERIA FLAG: NORMAL
TYRER CUZICK SCORE: 11.2

## 2025-01-07 PROCEDURE — 76642 ULTRASOUND BREAST LIMITED: CPT

## 2025-01-07 PROCEDURE — 25510000001 IOPAMIDOL PER 1 ML: Performed by: STUDENT IN AN ORGANIZED HEALTH CARE EDUCATION/TRAINING PROGRAM

## 2025-01-07 PROCEDURE — 77066 DX MAMMO INCL CAD BI: CPT

## 2025-01-07 PROCEDURE — G0279 TOMOSYNTHESIS, MAMMO: HCPCS

## 2025-01-07 RX ORDER — IOPAMIDOL 755 MG/ML
100 INJECTION, SOLUTION INTRAVASCULAR
Status: COMPLETED | OUTPATIENT
Start: 2025-01-07 | End: 2025-01-07

## 2025-01-07 RX ADMIN — IOPAMIDOL 100 ML: 755 INJECTION, SOLUTION INTRAVENOUS at 12:21

## 2025-01-07 NOTE — PATIENT INSTRUCTIONS

## 2025-01-07 NOTE — TELEPHONE ENCOUNTER
Called the patient and to let her know that US and mammo showed a cyst as expected. If it gets worse, we can have radiology drain it under US guidance with a needle. Pt understood.

## 2025-03-17 ENCOUNTER — TELEPHONE (OUTPATIENT)
Dept: GASTROENTEROLOGY | Age: 55
End: 2025-03-17

## 2025-03-17 NOTE — TELEPHONE ENCOUNTER
03-  patient called [facility] that dr Cedeno put her on Colestipol which has been working.  Stated that she has called [facility] who says that Dr Cedeno has denied it.        Called [facility]  [phone]  Spoke with Sofie   She stated that according to their records Dr Cedeno denied to refill the rx.      Routed to CT APRN

## 2025-03-18 DIAGNOSIS — R19.7 DIARRHEA, UNSPECIFIED TYPE: Primary | ICD-10-CM

## 2025-03-18 RX ORDER — COLESTIPOL HYDROCHLORIDE 1 G/1
1 TABLET ORAL 2 TIMES DAILY
Qty: 60 TABLET | Refills: 11 | Status: SHIPPED | OUTPATIENT
Start: 2025-03-18

## 2025-03-28 NOTE — ANESTHESIA PRE PROCEDURE
Department of Anesthesiology  Preprocedure Note       Name:  Loren Jauregui   Age:  57 y.o.  :  1970                                          MRN:  [id number]         Date:  2018      Surgeon: Edel Jha):  Sandra Birmingham MD    Procedure: EGD BIOPSY (N/A Abdomen)    Medications prior to admission:   Prior to Admission medications    Medication Sig Start Date End Date Taking? Authorizing Provider   ondansetron (ZOFRAN) 4 MG tablet Take 2 tablets 30 min prior to colon prep, and again at bedtime if needed 18   LENORA Zuluaga   fexofenadine (ALLEGRA) 180 MG tablet Take 180 mg by mouth daily    Historical Provider, MD   Omega-3 Fatty Acids (FISH OIL) 1200 MG CAPS Take 1,200 mg by mouth daily    Historical Provider, MD   levothyroxine (SYNTHROID) 75 MCG tablet Take 75 mcg by mouth Daily    Historical Provider, MD   vilazodone HCl (VILAZODONE HCL) 40 MG TABS Take 40 mg by mouth daily    Historical Provider, MD   zinc gluconate 50 MG tablet Take 50 mg by mouth daily    Historical Provider, MD       Current medications:    Current Facility-Administered Medications   Medication Dose Route Frequency Provider Last Rate Last Dose    lactated ringers infusion   Intravenous Continuous Sandra Birmingham MD        lidocaine PF 1 % injection 1 mL  1 mL Intradermal Once Sandra Birmingham MD           Allergies: Allergies   Allergen Reactions    Sulfamethoxazole-Trimethoprim Anaphylaxis    Azithromycin        Problem List:    Patient Active Problem List   Diagnosis Code    Chronic diarrhea K52.9    S/P cholecystectomy Z90.49    Dysphagia R13.10    History of colonic diverticulitis Z87.19       Past Medical History:        Diagnosis Date    Anxiety     GERD (gastroesophageal reflux disease)     Hypothyroidism        Past Surgical History:        Procedure Laterality Date    CHOLECYSTECTOMY       DR. Whyte Po EGD      Dr. Wilfredo Tinoco         Social History:    Social History   Substance Use yes

## 2025-04-11 ENCOUNTER — APPOINTMENT (OUTPATIENT)
Dept: CT IMAGING | Facility: HOSPITAL | Age: 55
End: 2025-04-11
Payer: COMMERCIAL

## 2025-04-11 ENCOUNTER — HOSPITAL ENCOUNTER (EMERGENCY)
Facility: HOSPITAL | Age: 55
Discharge: HOME | End: 2025-04-11
Payer: COMMERCIAL

## 2025-04-11 VITALS
HEART RATE: 112 BPM | TEMPERATURE: 99 F | BODY MASS INDEX: 30.66 KG/M2 | SYSTOLIC BLOOD PRESSURE: 123 MMHG | WEIGHT: 184 LBS | OXYGEN SATURATION: 97 % | DIASTOLIC BLOOD PRESSURE: 51 MMHG | RESPIRATION RATE: 18 BRPM | HEIGHT: 65 IN

## 2025-04-11 DIAGNOSIS — K57.90 DIVERTICULOSIS: ICD-10-CM

## 2025-04-11 DIAGNOSIS — N39.0 URINARY TRACT INFECTION WITH HEMATURIA, SITE UNSPECIFIED: Primary | ICD-10-CM

## 2025-04-11 DIAGNOSIS — R31.9 URINARY TRACT INFECTION WITH HEMATURIA, SITE UNSPECIFIED: Primary | ICD-10-CM

## 2025-04-11 DIAGNOSIS — N20.0 LEFT RENAL STONE: ICD-10-CM

## 2025-04-11 DIAGNOSIS — K76.0 FATTY LIVER: ICD-10-CM

## 2025-04-11 DIAGNOSIS — K44.9 HIATAL HERNIA: ICD-10-CM

## 2025-04-11 LAB
ALBUMIN SERPL-MCNC: 4.4 G/DL (ref 3.5–5.2)
ALBUMIN/GLOB SERPL: 1.6 G/DL
ALP SERPL-CCNC: 63 U/L (ref 39–117)
ALT SERPL W P-5'-P-CCNC: 25 U/L (ref 1–33)
ANION GAP SERPL CALCULATED.3IONS-SCNC: 13 MMOL/L (ref 5–15)
AST SERPL-CCNC: 24 U/L (ref 1–32)
BACTERIA UR QL AUTO: ABNORMAL /HPF
BASOPHILS # BLD AUTO: 0.03 10*3/MM3 (ref 0–0.2)
BASOPHILS NFR BLD AUTO: 0.3 % (ref 0–1.5)
BILIRUB SERPL-MCNC: 0.8 MG/DL (ref 0–1.2)
BILIRUB UR QL STRIP: ABNORMAL
BUN SERPL-MCNC: 11 MG/DL (ref 6–20)
BUN/CREAT SERPL: 16.9 (ref 7–25)
CALCIUM SPEC-SCNC: 8.8 MG/DL (ref 8.6–10.5)
CHLORIDE SERPL-SCNC: 99 MMOL/L (ref 98–107)
CLARITY UR: ABNORMAL
CO2 SERPL-SCNC: 26 MMOL/L (ref 22–29)
COLOR UR: ABNORMAL
CREAT SERPL-MCNC: 0.65 MG/DL (ref 0.57–1)
D-LACTATE SERPL-SCNC: 1.3 MMOL/L (ref 0.5–2)
DEPRECATED RDW RBC AUTO: 40.6 FL (ref 37–54)
EGFRCR SERPLBLD CKD-EPI 2021: 106.1 ML/MIN/1.73
EOSINOPHIL # BLD AUTO: 0.11 10*3/MM3 (ref 0–0.4)
EOSINOPHIL NFR BLD AUTO: 0.9 % (ref 0.3–6.2)
ERYTHROCYTE [DISTWIDTH] IN BLOOD BY AUTOMATED COUNT: 12 % (ref 12.3–15.4)
GLOBULIN UR ELPH-MCNC: 2.8 GM/DL
GLUCOSE SERPL-MCNC: 109 MG/DL (ref 65–99)
GLUCOSE UR STRIP-MCNC: NEGATIVE MG/DL
HCG SERPL QL: NEGATIVE
HCT VFR BLD AUTO: 40.5 % (ref 34–46.6)
HGB BLD-MCNC: 14 G/DL (ref 12–15.9)
HGB UR QL STRIP.AUTO: ABNORMAL
HYALINE CASTS UR QL AUTO: ABNORMAL /LPF
IMM GRANULOCYTES # BLD AUTO: 0.04 10*3/MM3 (ref 0–0.05)
IMM GRANULOCYTES NFR BLD AUTO: 0.3 % (ref 0–0.5)
KETONES UR QL STRIP: ABNORMAL
LEUKOCYTE ESTERASE UR QL STRIP.AUTO: ABNORMAL
LIPASE SERPL-CCNC: 24 U/L (ref 13–60)
LYMPHOCYTES # BLD AUTO: 0.36 10*3/MM3 (ref 0.7–3.1)
LYMPHOCYTES NFR BLD AUTO: 3.1 % (ref 19.6–45.3)
MCH RBC QN AUTO: 31.8 PG (ref 26.6–33)
MCHC RBC AUTO-ENTMCNC: 34.6 G/DL (ref 31.5–35.7)
MCV RBC AUTO: 92 FL (ref 79–97)
MONOCYTES # BLD AUTO: 0.39 10*3/MM3 (ref 0.1–0.9)
MONOCYTES NFR BLD AUTO: 3.3 % (ref 5–12)
NEUTROPHILS NFR BLD AUTO: 10.79 10*3/MM3 (ref 1.7–7)
NEUTROPHILS NFR BLD AUTO: 92.1 % (ref 42.7–76)
NITRITE UR QL STRIP: POSITIVE
NRBC BLD AUTO-RTO: 0 /100 WBC (ref 0–0.2)
PH UR STRIP.AUTO: <=5 [PH] (ref 5–8)
PLATELET # BLD AUTO: 296 10*3/MM3 (ref 140–450)
PMV BLD AUTO: 9.5 FL (ref 6–12)
POTASSIUM SERPL-SCNC: 3.1 MMOL/L (ref 3.5–5.2)
PROT SERPL-MCNC: 7.2 G/DL (ref 6–8.5)
PROT UR QL STRIP: ABNORMAL
RBC # BLD AUTO: 4.4 10*6/MM3 (ref 3.77–5.28)
RBC # UR STRIP: ABNORMAL /HPF
REF LAB TEST METHOD: ABNORMAL
SODIUM SERPL-SCNC: 138 MMOL/L (ref 136–145)
SP GR UR STRIP: 1.02 (ref 1–1.03)
SQUAMOUS #/AREA URNS HPF: ABNORMAL /HPF
UROBILINOGEN UR QL STRIP: ABNORMAL
WBC # UR STRIP: ABNORMAL /HPF
WBC NRBC COR # BLD AUTO: 11.72 10*3/MM3 (ref 3.4–10.8)

## 2025-04-11 PROCEDURE — 25010000002 CEFTRIAXONE PER 250 MG: Performed by: PHYSICIAN ASSISTANT

## 2025-04-11 PROCEDURE — 81001 URINALYSIS AUTO W/SCOPE: CPT | Performed by: PHYSICIAN ASSISTANT

## 2025-04-11 PROCEDURE — 84703 CHORIONIC GONADOTROPIN ASSAY: CPT | Performed by: PHYSICIAN ASSISTANT

## 2025-04-11 PROCEDURE — 80053 COMPREHEN METABOLIC PANEL: CPT | Performed by: PHYSICIAN ASSISTANT

## 2025-04-11 PROCEDURE — 85025 COMPLETE CBC W/AUTO DIFF WBC: CPT | Performed by: PHYSICIAN ASSISTANT

## 2025-04-11 PROCEDURE — 83690 ASSAY OF LIPASE: CPT | Performed by: PHYSICIAN ASSISTANT

## 2025-04-11 PROCEDURE — 25510000001 IOPAMIDOL 61 % SOLUTION: Performed by: PHYSICIAN ASSISTANT

## 2025-04-11 PROCEDURE — 96365 THER/PROPH/DIAG IV INF INIT: CPT

## 2025-04-11 PROCEDURE — 74177 CT ABD & PELVIS W/CONTRAST: CPT

## 2025-04-11 PROCEDURE — 25810000003 SODIUM CHLORIDE 0.9 % SOLUTION: Performed by: PHYSICIAN ASSISTANT

## 2025-04-11 PROCEDURE — 25010000002 ONDANSETRON PER 1 MG: Performed by: PHYSICIAN ASSISTANT

## 2025-04-11 PROCEDURE — 87086 URINE CULTURE/COLONY COUNT: CPT | Performed by: PHYSICIAN ASSISTANT

## 2025-04-11 PROCEDURE — 99285 EMERGENCY DEPT VISIT HI MDM: CPT

## 2025-04-11 PROCEDURE — 83605 ASSAY OF LACTIC ACID: CPT | Performed by: PHYSICIAN ASSISTANT

## 2025-04-11 PROCEDURE — 96375 TX/PRO/DX INJ NEW DRUG ADDON: CPT

## 2025-04-11 RX ORDER — ONDANSETRON 2 MG/ML
4 INJECTION INTRAMUSCULAR; INTRAVENOUS ONCE
Status: COMPLETED | OUTPATIENT
Start: 2025-04-11 | End: 2025-04-11

## 2025-04-11 RX ORDER — CIPROFLOXACIN 500 MG/1
500 TABLET, FILM COATED ORAL 2 TIMES DAILY
Qty: 14 TABLET | Refills: 0 | Status: SHIPPED | OUTPATIENT
Start: 2025-04-11 | End: 2025-04-18

## 2025-04-11 RX ORDER — ONDANSETRON 4 MG/1
4 TABLET, ORALLY DISINTEGRATING ORAL EVERY 6 HOURS PRN
Qty: 12 TABLET | Refills: 0 | Status: SHIPPED | OUTPATIENT
Start: 2025-04-11

## 2025-04-11 RX ORDER — IOPAMIDOL 612 MG/ML
100 INJECTION, SOLUTION INTRAVASCULAR
Status: COMPLETED | OUTPATIENT
Start: 2025-04-11 | End: 2025-04-11

## 2025-04-11 RX ORDER — SODIUM CHLORIDE 0.9 % (FLUSH) 0.9 %
10 SYRINGE (ML) INJECTION AS NEEDED
Status: DISCONTINUED | OUTPATIENT
Start: 2025-04-11 | End: 2025-04-11 | Stop reason: HOSPADM

## 2025-04-11 RX ORDER — PHENAZOPYRIDINE HYDROCHLORIDE 100 MG/1
100 TABLET, FILM COATED ORAL 3 TIMES DAILY PRN
Qty: 12 TABLET | Refills: 0 | Status: SHIPPED | OUTPATIENT
Start: 2025-04-11

## 2025-04-11 RX ADMIN — IOPAMIDOL 100 ML: 612 INJECTION, SOLUTION INTRAVENOUS at 17:31

## 2025-04-11 RX ADMIN — SODIUM CHLORIDE 1000 ML: 9 INJECTION, SOLUTION INTRAVENOUS at 17:44

## 2025-04-11 RX ADMIN — CEFTRIAXONE SODIUM 2000 MG: 2 INJECTION, POWDER, FOR SOLUTION INTRAMUSCULAR; INTRAVENOUS at 17:45

## 2025-04-11 RX ADMIN — ONDANSETRON 4 MG: 2 INJECTION INTRAMUSCULAR; INTRAVENOUS at 17:44

## 2025-04-11 NOTE — Clinical Note
[facility] EMERGENCY DEPARTMENT  [address]  [facility] [address]  Phone: [phone]    Bre Birch was seen and treated in our emergency department on 4/11/2025.  She may return to work on 04/14/2025.         Thank you for choosing [facility].    Lalo Hernandez PA-C

## 2025-04-11 NOTE — DISCHARGE INSTRUCTIONS
Please STOP the nitrofurantoin and START the ciprofloxacin antibiotic for treatment of your urinary tract infection.  Please try to minimize sodas and increase hydration with water.    Incidentally you have a 0.2 cm stone in your left kidney, diverticulosis, a hiatal hernia, and fatty liver disease.  For these please make positive diet and lifestyle choices and follow-up with your primary care provider for outpatient monitoring.    You will need to follow with your primary care provider within the next 48 hours however should you develop any new or worsening symptoms please return to the ER for further evaluation.

## 2025-04-11 NOTE — ED PROVIDER NOTES
Subjective   History of Present Illness    Patient is a 54-year-old female presenting to ED with worsening urinary tract symptoms.  PMH significant for hypothyroidism, JW, hiatal hernia, cholecystectomy, endometrial ablation, tubal ligation, history of hiatal hernia repair.  Patient states she has a history of frequent urinary tract infections for which 2 days ago she thought she was developing 1 with mild dysuria, urinary urgency as well as low-grade fevers and nausea.  Patient states that she was seen by her primary care provider yesterday and started on nitrofurantoin however she woke up today significantly worse.  Patient states that she is now having pain in her right lower flank region which is abnormal for her, worsening urinary frequency and dysuria as well as an episode of vomiting with chills.  Patient states she is having no left-sided symptoms, no vaginal discharge and no hematuria.  Patient tried taking over-the-counter Azo which turned her urine orange but provided no other relief.  Patient became concerned at which time she presents for further evaluation.    Records reviewed show patient was last seen in the outpatient setting at the general surgery office on 1/6/2025 for fibrocystic breast disease, mammography screening, benign breast cyst in the right, diastases recti.    Patient last seen the ED on 11/27/2021 for bronchitis.    Patient's last abdominal imaging was a CT on 10/10/2018 which showed: Findings compatible with acute segmental sigmoid diverticulitis/colitis, no evidence of perforation or abscess, 4 cm left ovarian cyst, mild fatty infiltration of liver, 3.3 cm cystic lesion in the lower outer quadrant of the left breast.    Patient's last urine culture was obtained on 12/27/2023 with less than 10,000 bacteria clinically insignificant.  Patient's last positive urine culture was on 5/3/2023 which showed beta-hemolytic strep group B and ,000 mixed urogenital mitali.    Review of  Systems   Constitutional:  Positive for chills and fever. Negative for diaphoresis.   HENT: Negative.     Eyes: Negative.    Respiratory: Negative.     Cardiovascular: Negative.    Gastrointestinal:  Positive for abdominal pain (right sided) and nausea. Negative for diarrhea and vomiting.   Genitourinary:  Positive for dysuria and flank pain (right sided). Negative for hematuria.   Skin: Negative.    Neurological: Negative.    Psychiatric/Behavioral: Negative.     All other systems reviewed and are negative.      Past Medical History:   Diagnosis Date    Anxiety     GERD (gastroesophageal reflux disease)     Hiatal hernia     Hyperlipidemia     Hypothyroid     Obstructive sleep apnea        Allergies   Allergen Reactions    Bactrim [Sulfamethoxazole-Trimethoprim] Anaphylaxis    Azithromycin Hives    Macrolides And Ketolides Dizziness     Short of breath       Past Surgical History:   Procedure Laterality Date    BREAST BIOPSY Left 2 yrs ago    CHOLECYSTECTOMY      COLONOSCOPY      ENDOMETRIAL ABLATION      ENDOSCOPY      FOOT ARTHRODESIS, MODIFIED DOUGLASS      HERNIA REPAIR      HIATAL HERNIA REPAIR N/A 5/2/2024    Procedure: LAPAROSCOPIC HIATAL HERNIA REPAIR WITH [employer] ROBOT;  Surgeon: Uli Marte MD;  Location: [facility] OR;  Service: Robotics - [employer];  Laterality: N/A;    TUBAL ABDOMINAL LIGATION         Family History   Problem Relation Age of Onset    Hypertension Father     Stroke Mother     Hypertension Brother     No Known Problems Daughter     No Known Problems Paternal Grandmother     No Known Problems Maternal Grandmother     Cancer Maternal Grandfather     No Known Problems Maternal Aunt     No Known Problems Paternal Aunt     BRCA 1/2 Neg Hx     Breast cancer Neg Hx     Colon cancer Neg Hx     Endometrial cancer Neg Hx     Ovarian cancer Neg Hx        Social History     Socioeconomic History    Marital status:    Tobacco Use    Smoking status: Never    Smokeless tobacco: Never   Vaping Use     Vaping status: Never Used   Substance and Sexual Activity    Alcohol use: No    Drug use: No    Sexual activity: Yes     Partners: Male     Birth control/protection: Surgical           Objective   Physical Exam  Vitals and nursing note reviewed.   Constitutional:       General: She is not in acute distress.     Appearance: Normal appearance. She is obese. She is not ill-appearing, toxic-appearing or diaphoretic.   HENT:      Head: Normocephalic.      Mouth/Throat:      Mouth: Mucous membranes are moist.      Pharynx: Oropharynx is clear.   Eyes:      General: No scleral icterus.     Conjunctiva/sclera: Conjunctivae normal.      Pupils: Pupils are equal, round, and reactive to light.   Cardiovascular:      Rate and Rhythm: Regular rhythm. Tachycardia present.   Pulmonary:      Effort: Pulmonary effort is normal.      Breath sounds: Normal breath sounds.   Abdominal:      General: Bowel sounds are normal. There is no distension.      Palpations: Abdomen is soft.      Tenderness: There is no abdominal tenderness. There is right CVA tenderness. There is no left CVA tenderness, guarding or rebound.   Musculoskeletal:         General: Normal range of motion.      Cervical back: Neck supple.   Skin:     General: Skin is warm and dry.      Coloration: Skin is not jaundiced.   Neurological:      Mental Status: She is alert and oriented to person, place, and time.      Gait: Gait normal.   Psychiatric:         Mood and Affect: Mood normal.         Behavior: Behavior normal.         Procedures           ED Course                                                       Medical Decision Making  Amount and/or Complexity of Data Reviewed  Independent Historian:      Details: , Daughters (x2)  External Data Reviewed: labs, radiology and notes.  Labs: ordered. Decision-making details documented in ED Course.  Radiology: ordered. Decision-making details documented in ED Course.  ECG/medicine tests: ordered. Decision-making  details documented in ED Course.    Risk  Prescription drug management.      Patient is a 54-year-old female presenting to ED with worsening urinary tract symptoms.  PMH significant for hypothyroidism, JW, hiatal hernia, cholecystectomy, endometrial ablation, tubal ligation, history of hiatal hernia repair.  Upon initial evaluation patient resting in bed in no acute distress.  Nontoxic-appearing, non-ill-appearing, nondiaphoretic.  Patient is tachycardic with otherwise unremarkable vital signs but examination finds tenderness to the right CVA region with no left-sided CVAT.  No anterior abdominal tenderness.  Anterior abdomen is soft, nontender, nondistended.  No sclericterus or jaundice.  No further acute examination findings.  Discussed with patient need for workup to include labs, urinalysis, CT imaging and ability to use IV fluids and antiemetics for which she is amenable with no further questions concerns, or needs at this time.    Differential diagnosis: Urinary tract infection, pyelonephritis, hydronephrosis, ureteral stone, renal stone, RAVI, systemic infection, other    Lab work revealed leukocytosis 11.72.  Stable H&H, normal platelets and no further CBC abnormalities.  CMP with hypokalemia 3.1 for which patient was able to tolerate oral replacement.  No further electro disturbances.  Normal renal and hepatic function.  Normal anion gap.  Pregnancy testing negative.  Low concern for pancreatic abnormally such as pancreatitis with normal lipase.  Urinalysis with evidence of infection with large leukocytes, TNTC WBC and 2+ bacteria.  Concern for contamination with 13-20 squamous epithelium for which a culture was sent.  11-20 RBCs.  80 ketones, small bilirubin, 30 proteinuria for which IV fluids were given.  Positive nitrites however patient has been taking over-the-counter Azo.  Patient was given a dose of IV Rocephin. CT imaging of the abdomen and pelvis showed: No acute abnormality, fatty infiltrated  liver, small hiatal hernia, fluid-filled nondilated loops of small bowel, 0.2 cm nonobstructing stone upper pole left kidney, diverticulosis, without acute diverticulitis.  After dose of Zofran and fluid bolus patient reported significant improvement in her symptoms.  Patient was given IV dose of Rocephin and advised need to stop the nitrofurantoin and start ciprofloxacin.  Advised increase hydration, need to avoid excessive soda products.  Discussed need for PCP follow-up within the next 48 hours for close reevaluation, strict return precautions, need for immediate return to ED should she develop any new or worsening symptoms.  Reviewed incidental findings and need for management through primary care provider as well.  Patient had resolution of her initial tachycardic status, tolerated p.o. fluids without difficulty and was very appreciative with no further questions, concerns, or needs at this time and is stable for discharge.    Final diagnoses:   Urinary tract infection with hematuria, site unspecified   Left renal stone   Hiatal hernia   Diverticulosis   Fatty liver       ED Disposition  ED Disposition       ED Disposition   Discharge    Condition   Stable    Comment   --               Rik Berry MD  [address] [facility] [id number] [phone]    Schedule an appointment as soon as possible for a visit in 2 days      [facility] EMERGENCY DEPARTMENT  [address] [place] [id number] [phone]    As needed         Medication List        New Prescriptions      ciprofloxacin 500 MG tablet  Commonly known as: CIPRO  Take 1 tablet by mouth 2 (Two) Times a Day for 7 days.     ondansetron ODT 4 MG disintegrating tablet  Commonly known as: ZOFRAN-ODT  Place 1 tablet on the tongue Every 6 (Six) Hours As Needed for Nausea.     phenazopyridine 100 MG tablet  Commonly known as: PYRIDIUM  Take 1 tablet by mouth 3 (Three) Times a Day As Needed for Dysuria or Bladder Spasms.                Where to Get Your Medications        These medications were sent to [facility] - [place], [place] - [address] - [phone]  - [phone]   [address] [place] [address]      Phone: [phone]   ciprofloxacin 500 MG tablet  ondansetron ODT 4 MG disintegrating tablet  phenazopyridine 100 MG tablet            Lalo Hernandez PA-C  04/11/25 [id number]

## 2025-04-13 LAB — BACTERIA SPEC AEROBE CULT: NO GROWTH

## 2025-04-30 NOTE — PROGRESS NOTES
Subjective    Ms. Birch is 54 y.o. female    Chief Complaint: ER follow up acute cystitis and finding of renal stone    History of Present Illness    54-year-old female new patient referred in ER follow-up 4/11/2025 due to episode of acute cystitis where patient presented with new onset nausea vomiting and left-sided flank pain along with suprapubic pain and pressure.  CT abdomen and pelvis imaging revealed a nonobstructing 2 mm left lower pole renal stone.  Patient reports a previous history 2 years ago of recurrent infections treated through her PCP however nothing as of recent.  During ER visit was found to have 2+ bacteria.  Urine culture revealed no bacterial growth.  Patient was treated with ciprofloxacin.  Patient reports had been doing well after completion of antibiotic until the last 1 to 2 days when patient has started to experience return of suprapubic pressure/pain and increased urine frequency.  Patient is worried that the infection has returned.  Denies prior history of kidney stones.    The following portions of the patient's history were reviewed and updated as appropriate: allergies, current medications, past family history, past medical history, past social history, past surgical history and problem list.    Review of Systems   Constitutional:  Negative for chills and fever.   Gastrointestinal:  Negative for abdominal pain, anal bleeding and blood in stool.   Genitourinary:  Positive for flank pain and urgency. Negative for dysuria and hematuria.         Current Outpatient Medications:     desvenlafaxine (PRISTIQ) 50 MG 24 hr tablet, Take 1 tablet by mouth Daily., Disp: , Rfl:     fexofenadine (Allegra Allergy) 180 MG tablet, Take 1 tablet by mouth Daily., Disp: 30 tablet, Rfl: 0    levothyroxine (SYNTHROID, LEVOTHROID) 75 MCG tablet, Take 1 tablet by mouth Daily., Disp: , Rfl:     losartan-hydrochlorothiazide (HYZAAR) 50-12.5 MG per tablet, Take 1 tablet by mouth Daily., Disp: , Rfl:      multivitamin with minerals tablet tablet, Take 1 tablet by mouth Daily., Disp: , Rfl:     Omega-3 Fatty Acids (FISH OIL) 1000 MG capsule capsule, Take 2 capsules by mouth Daily With Breakfast., Disp: , Rfl:     omeprazole (priLOSEC) 40 MG capsule, Take 1 capsule by mouth Daily., Disp: , Rfl:     ondansetron ODT (ZOFRAN-ODT) 4 MG disintegrating tablet, Place 1 tablet on the tongue Every 6 (Six) Hours As Needed for Nausea., Disp: 12 tablet, Rfl: 0    phenazopyridine (PYRIDIUM) 100 MG tablet, Take 1 tablet by mouth 3 (Three) Times a Day As Needed for Dysuria or Bladder Spasms., Disp: 12 tablet, Rfl: 0    POTASSIUM GLUCONATE PO, Take  by mouth., Disp: , Rfl:     pravastatin (PRAVACHOL) 40 MG tablet, Take 1 tablet by mouth Every Night., Disp: , Rfl:     Past Medical History:   Diagnosis Date    Anxiety     GERD (gastroesophageal reflux disease)     Hiatal hernia     Hyperlipidemia     Hypothyroid     Obstructive sleep apnea        Past Surgical History:   Procedure Laterality Date    BREAST BIOPSY Left 2 yrs ago    CHOLECYSTECTOMY      COLONOSCOPY      ENDOMETRIAL ABLATION      ENDOSCOPY      FOOT ARTHRODESIS, MODIFIED DOUGLASS      HERNIA REPAIR      HIATAL HERNIA REPAIR N/A 5/2/2024    Procedure: LAPAROSCOPIC HIATAL HERNIA REPAIR WITH [employer] ROBOT;  Surgeon: Uli Marte MD;  Location: [facility];  Service: Robotics - [email or web address];  Laterality: N/A;    TUBAL ABDOMINAL LIGATION         Social History     Socioeconomic History    Marital status:    Tobacco Use    Smoking status: Never    Smokeless tobacco: Never   Vaping Use    Vaping status: Never Used   Substance and Sexual Activity    Alcohol use: No    Drug use: No    Sexual activity: Yes     Partners: Male     Birth control/protection: Surgical       Family History   Problem Relation Age of Onset    Hypertension Father     Stroke Mother     Hypertension Brother     No Known Problems Daughter     No Known Problems Paternal Grandmother     No Known Problems  Maternal Grandmother     Cancer Maternal Grandfather     No Known Problems Maternal Aunt     No Known Problems Paternal Aunt     BRCA 1/2 Neg Hx     Breast cancer Neg Hx     Colon cancer Neg Hx     Endometrial cancer Neg Hx     Ovarian cancer Neg Hx        Objective    There were no vitals taken for this visit.    Physical Exam        Results for orders placed or performed in visit on 05/02/25   POC Urinalysis Dipstick, Multipro    Collection Time: 05/02/25  2:37 PM    Specimen: Urine   Result Value Ref Range    Color Yellow Yellow, Straw, Dark Yellow, Carly    Clarity, UA Clear Clear    Glucose, UA Negative Negative mg/dL    Bilirubin Negative Negative    Ketones, UA Negative Negative    Specific Gravity  1.020 1.005 - 1.030    Blood, UA Small (A) Negative    pH, Urine 7.5 5.0 - 8.0    Protein, POC Negative Negative mg/dL    Urobilinogen, UA 0.2 E.U./dL Normal, 0.2 E.U./dL    Nitrite, UA Negative Negative    Leukocytes Small (1+) (A) Negative   CT Abdomen Pelvis With Contrast (04/11/2025 17:30) XR Abdomen KUB (05/02/2025 14:03)   Assessment and Plan    Diagnoses and all orders for this visit:    1. Kidney stone (Primary)  -     POC Urinalysis Dipstick, Multipro  -     XR Abdomen KUB; Future      Will send urine out today through guidance UTI urine culture PCR testing given the last culture showed no bacterial growth through [employer] and the microscopic evaluation revealed 2+ bacteria.    Based on CT results recommend continuing to monitor the 2 mm left lower pole renal stone as this would not be the source to patient's pain as it is nonobstructing.    May consider starting on a vaginal estrogen cream in the future if patient continues to experience infections.    Follow-up 3 months

## 2025-05-02 ENCOUNTER — OFFICE VISIT (OUTPATIENT)
Dept: UROLOGY | Facility: CLINIC | Age: 55
End: 2025-05-02
Payer: COMMERCIAL

## 2025-05-02 ENCOUNTER — HOSPITAL ENCOUNTER (OUTPATIENT)
Dept: GENERAL RADIOLOGY | Facility: HOSPITAL | Age: 55
Discharge: HOME | End: 2025-05-02
Payer: COMMERCIAL

## 2025-05-02 DIAGNOSIS — N20.0 KIDNEY STONE: ICD-10-CM

## 2025-05-02 DIAGNOSIS — N20.0 KIDNEY STONE: Primary | ICD-10-CM

## 2025-05-02 LAB
BILIRUB BLD-MCNC: NEGATIVE MG/DL
CLARITY, POC: CLEAR
COLOR UR: YELLOW
GLUCOSE UR STRIP-MCNC: NEGATIVE MG/DL
KETONES UR QL: NEGATIVE
LEUKOCYTE EST, POC: ABNORMAL
NITRITE UR-MCNC: NEGATIVE MG/ML
PH UR: 7.5 [PH] (ref 5–8)
PROT UR STRIP-MCNC: NEGATIVE MG/DL
RBC # UR STRIP: ABNORMAL /UL
SP GR UR: 1.02 (ref 1–1.03)
UROBILINOGEN UR QL: ABNORMAL

## 2025-05-02 PROCEDURE — 74018 RADEX ABDOMEN 1 VIEW: CPT

## 2025-05-07 ENCOUNTER — TELEPHONE (OUTPATIENT)
Dept: UROLOGY | Facility: CLINIC | Age: 55
End: 2025-05-07
Payer: COMMERCIAL

## 2025-05-07 NOTE — TELEPHONE ENCOUNTER
Pt called in for results from [facility] urine culture. I advised pt that Elayne has not yet resulted these, but we will give her a call once we receive final results.

## 2025-05-08 ENCOUNTER — OFFICE VISIT (OUTPATIENT)
Age: 55
End: 2025-05-08
Payer: COMMERCIAL

## 2025-05-08 VITALS
BODY MASS INDEX: 31.16 KG/M2 | HEIGHT: 65 IN | WEIGHT: 187 LBS | SYSTOLIC BLOOD PRESSURE: 108 MMHG | DIASTOLIC BLOOD PRESSURE: 74 MMHG

## 2025-05-08 DIAGNOSIS — N39.0 FREQUENT UTI: ICD-10-CM

## 2025-05-08 DIAGNOSIS — Z01.419 ENCOUNTER FOR GYNECOLOGICAL EXAMINATION: Primary | ICD-10-CM

## 2025-05-08 DIAGNOSIS — N89.8 VAGINAL IRRITATION: ICD-10-CM

## 2025-05-08 PROCEDURE — 87624 HPV HI-RISK TYP POOLED RSLT: CPT | Performed by: NURSE PRACTITIONER

## 2025-05-08 PROCEDURE — G0123 SCREEN CERV/VAG THIN LAYER: HCPCS | Performed by: NURSE PRACTITIONER

## 2025-05-08 RX ORDER — CONJUGATED ESTROGENS 0.62 MG/G
CREAM VAGINAL DAILY
Qty: 30 G | Refills: 0 | Status: SHIPPED | OUTPATIENT
Start: 2025-05-08

## 2025-05-08 NOTE — PROGRESS NOTES
Chief Complaint  Annual Exam (PT is here for an annual exam /Last pap 9/1/22 WNL /Last mammogram (MRI) 1/7/25 cyst of right breast- followed by Dr Cole /Pt has no complaints today /)  History of Present Illness  The patient is a 54-year-old female who presents for evaluation of recurrent urinary tract infections, vaginal dryness, hot flashes, and diastasis recti.    She has been experiencing recurrent urinary tract infections (UTIs), which she initially attributed to her current symptoms. Persistent pressure and incomplete resolution of her symptoms are reported. She has not yet received the results of her urine culture from Dr. Nani Robles's office. She maintains good personal hygiene, including the use of 100% cotton underwear and regular showers. She avoids strong-scented products and ensures proper wiping techniques. Increased water intake and reduced consumption of Coke Zero are noted, although some consumption continues. She does not drink coffee but occasionally enjoys flavored tea. Despite low fluid intake, frequent urination and waking up at least once during the night to urinate are reported. No history of sexually transmitted diseases (STDs) is reported, and STD testing is declined.   She is under the care of Dr. Cole and undergoes mammograms and ultrasounds every six months.   A previous emergency room visit revealed blood in her urine, although she did not observe it herself.     Sexual activity is reported but is not believed to be related to her UTIs. Constant warmth and clamminess are attributed to allergies or her ongoing UTI. Increased anxiety and vaginal dryness are reported. She has not tried Zyrtec for her allergies.    Diastasis recti has worsened over time. She is currently trying to lose weight and plans to have surgery for it.    High blood pressure and high cholesterol are reported. She hopes that losing weight will help with these conditions.      Qasim GAFFNEY  "Eyal presents to [facility]  History of Present Illness    Review of Systems   Constitutional:  Negative for activity change, appetite change, fatigue and fever.   HENT:  Negative for congestion, sore throat and trouble swallowing.    Eyes:  Negative for pain, discharge and visual disturbance.   Respiratory:  Negative for apnea, shortness of breath and wheezing.    Cardiovascular:  Negative for chest pain, palpitations and leg swelling.   Gastrointestinal:  Negative for abdominal pain, constipation and diarrhea.   Endocrine:        Hot flashes   Genitourinary:  Positive for frequency. Negative for menstrual problem, pelvic pain and vaginal discharge.        Frequent UTIs   Musculoskeletal:  Negative for back pain and gait problem.   Skin:  Negative for color change and rash.   Neurological:  Negative for dizziness, weakness and numbness.   Psychiatric/Behavioral:  Negative for confusion and sleep disturbance.         Objective   Vital Signs:   /74   Ht 165.1 cm (65\")   Wt 84.8 kg (187 lb)   BMI 31.12 kg/m²     Physical Exam  Vitals and nursing note reviewed. Exam conducted with a chaperone present.   Constitutional:       General: She is not in acute distress.     Appearance: She is well-developed. She is not diaphoretic.   HENT:      Head: Normocephalic.      Right Ear: External ear normal.      Left Ear: External ear normal.      Nose: Nose normal.   Eyes:      General: No scleral icterus.        Right eye: No discharge.         Left eye: No discharge.      Conjunctiva/sclera: Conjunctivae normal.      Pupils: Pupils are equal, round, and reactive to light.   Neck:      Thyroid: No thyromegaly.      Vascular: No carotid bruit.      Trachea: No tracheal deviation.   Cardiovascular:      Rate and Rhythm: Normal rate and regular rhythm.      Heart sounds: Normal heart sounds. No murmur heard.  Pulmonary:      Effort: Pulmonary effort is normal. No respiratory distress.      Breath " sounds: Normal breath sounds. No wheezing.   Chest:   Breasts:     Breasts are symmetrical.      Right: Normal. No swelling, bleeding, inverted nipple, mass, nipple discharge, skin change or tenderness.      Left: Normal. No swelling, bleeding, inverted nipple, mass, nipple discharge, skin change or tenderness.   Abdominal:      General: There is no distension.      Palpations: Abdomen is soft. There is no mass.      Tenderness: There is no abdominal tenderness. There is no right CVA tenderness, left CVA tenderness or guarding.      Hernia: No hernia is present. There is no hernia in the left inguinal area or right inguinal area.   Genitourinary:     General: Normal vulva.      Exam position: Lithotomy position.      Labia:         Right: No rash, tenderness, lesion or injury.         Left: No rash, tenderness, lesion or injury.       Vagina: No signs of injury and foreign body. Vaginal discharge, erythema and tenderness present. No bleeding.      Cervix: Normal.      Uterus: Normal. Not enlarged, not fixed and not tender.       Adnexa: Right adnexa normal and left adnexa normal.        Right: No mass, tenderness or fullness.          Left: No mass, tenderness or fullness.        Rectum: Normal. No mass.      Comments:   BSU normal  Urethral meatus  Normal  Perineum  Normal  Musculoskeletal:         General: No tenderness. Normal range of motion.      Cervical back: Normal range of motion and neck supple.   Lymphadenopathy:      Head:      Right side of head: No submental, submandibular, tonsillar, preauricular, posterior auricular or occipital adenopathy.      Left side of head: No submental, submandibular, tonsillar, preauricular, posterior auricular or occipital adenopathy.      Cervical: No cervical adenopathy.      Right cervical: No superficial, deep or posterior cervical adenopathy.     Left cervical: No superficial, deep or posterior cervical adenopathy.      Upper Body:      Right upper body: No  supraclavicular, axillary or pectoral adenopathy.      Left upper body: No supraclavicular, axillary or pectoral adenopathy.      Lower Body: No right inguinal adenopathy. No left inguinal adenopathy.   Skin:     General: Skin is warm and dry.      Findings: No bruising, erythema or rash.   Neurological:      Mental Status: She is alert and oriented to person, place, and time.      Coordination: Coordination normal.   Psychiatric:         Mood and Affect: Mood normal.         Behavior: Behavior normal.         Thought Content: Thought content normal.         Judgment: Judgment normal.       Result Review :   The following data was reviewed by: VINAY Linda on 05/08/2025:    Data reviewed : Radiologic studies mammogram          Current Outpatient Medications on File Prior to Visit   Medication Sig    desvenlafaxine (PRISTIQ) 50 MG 24 hr tablet Take 1 tablet by mouth Daily.    fexofenadine (Allegra Allergy) 180 MG tablet Take 1 tablet by mouth Daily.    levothyroxine (SYNTHROID, LEVOTHROID) 75 MCG tablet Take 1 tablet by mouth Daily.    losartan-hydrochlorothiazide (HYZAAR) 50-12.5 MG per tablet Take 1 tablet by mouth Daily.    multivitamin with minerals tablet tablet Take 1 tablet by mouth Daily.    Omega-3 Fatty Acids (FISH OIL) 1000 MG capsule capsule Take 2 capsules by mouth Daily With Breakfast.    omeprazole (priLOSEC) 40 MG capsule Take 1 capsule by mouth Daily.    ondansetron ODT (ZOFRAN-ODT) 4 MG disintegrating tablet Place 1 tablet on the tongue Every 6 (Six) Hours As Needed for Nausea.    phenazopyridine (PYRIDIUM) 100 MG tablet Take 1 tablet by mouth 3 (Three) Times a Day As Needed for Dysuria or Bladder Spasms.    POTASSIUM GLUCONATE PO Take  by mouth.    pravastatin (PRAVACHOL) 40 MG tablet Take 1 tablet by mouth Every Night.     No current facility-administered medications on file prior to visit.          Assessment and Plan      Well woman GYN exam.   Pap smear done per ASCCP guidelines.    Pelvic exam with chaperone present.     Will have lab work at PCP.     Colonoscopy is up to date.     Discussed STD prevention and testing.   Pt declines Chlamydia/Gonorrhea/Trichomonas, RPR, Hep panel and HIV testing.     Mammogram will be scheduled at [facility].     Assessment & Plan  1. Recurrent urinary tract infections  - Prescription for estrogen cream issued with instructions to apply a pea-sized amount daily inside the vagina using an applicator, which should be discarded after each use.  - Additional swabs collected to test for various types of bacteria and yeast; results will be communicated upon receipt.  - Advised to drink plenty of water and avoid irritants such as coffee, tea, and sodas.  - Symptoms may indicate interstitial cystitis, characterized by inflammation and irritation of the bladder lining.    2. Vaginal dryness  - Estrogen cream expected to alleviate vaginal dryness.  - Advised to apply a pea-sized amount of the cream inside the vagina daily and use the residue on the urethra    3. Hot flashes  - Reports being constantly hot and clammy, she reports symptoms are possibly related to allergies or UTIs.  - Advised to try Zyrtec regularly, as it may help decrease hot flashes and night sweats.  - If Zyrtec proves effective, continue its use; if not, alternative treatments will be considered.    4. Diastasis recti  - In the process of losing weight and considering surgery for diastasis recti.  - Weight loss expected to help with the condition.    Diagnoses and all orders for this visit:    1. Encounter for gynecological examination (Primary)  -     Liquid-based Pap Smear, Screening  -     HPV DNA Probe, Direct - ThinPrep Vial, Cervix    2. Vaginal irritation  -     Genital Mycoplasmas PENNY, Swab - Swab, Vagina  -     NuSwab VG+ - Swab, Vagina    3. Frequent UTI    Other orders  -     Estrogens Conjugated (Premarin) 0.625 MG/GM vaginal cream; Insert  into the vagina Daily.  Dispense: 30 g; Refill:  0                  Follow Up   Return for Annual physical.    Patient was given instructions and counseling regarding her condition or for health maintenance advice. Please see specific information pulled into the AVS if appropriate.     Patient or patient representative verbalized consent for the use of Ambient Listening during the visit with  VINAY Linda for chart documentation. 5/12/2025  21:36 CDT

## 2025-05-12 LAB
GEN CATEG CVX/VAG CYTO-IMP: NORMAL
HPV I/H RISK 4 DNA CVX QL PROBE+SIG AMP: NOT DETECTED
LAB AP CASE REPORT: NORMAL
LAB AP GYN ADDITIONAL INFORMATION: NORMAL
Lab: NORMAL
PATH INTERP SPEC-IMP: NORMAL
STAT OF ADQ CVX/VAG CYTO-IMP: NORMAL

## 2025-05-13 ENCOUNTER — TELEPHONE (OUTPATIENT)
Dept: UROLOGY | Facility: CLINIC | Age: 55
End: 2025-05-13
Payer: COMMERCIAL

## 2025-05-13 DIAGNOSIS — N30.00 ACUTE CYSTITIS WITHOUT HEMATURIA: Primary | ICD-10-CM

## 2025-05-13 LAB
A VAGINAE DNA VAG QL NAA+PROBE: NORMAL SCORE
BVAB2 DNA VAG QL NAA+PROBE: NORMAL SCORE
C ALBICANS DNA VAG QL NAA+PROBE: NEGATIVE
C GLABRATA DNA VAG QL NAA+PROBE: NEGATIVE
C TRACH DNA SPEC QL NAA+PROBE: NEGATIVE
M GENITALIUM DNA SPEC QL NAA+PROBE: NEGATIVE
M HOMINIS DNA SPEC QL NAA+PROBE: NEGATIVE
MEGA1 DNA VAG QL NAA+PROBE: NORMAL SCORE
N GONORRHOEA DNA VAG QL NAA+PROBE: NEGATIVE
T VAGINALIS DNA VAG QL NAA+PROBE: NEGATIVE
UREAPLASMA DNA SPEC QL NAA+PROBE: NEGATIVE

## 2025-05-13 RX ORDER — NITROFURANTOIN 25; 75 MG/1; MG/1
100 CAPSULE ORAL 2 TIMES DAILY
Qty: 14 CAPSULE | Refills: 0 | Status: SHIPPED | OUTPATIENT
Start: 2025-05-13

## 2025-05-13 NOTE — TELEPHONE ENCOUNTER
I do not believe these test have been being resulted the correct way as I did not get any notification on the results.  Patient appears to have grown strep which is considered a contaminant however if patient is experiencing symptoms we will send in nitrofurantoin as the test did result with a susceptibility report.  Please apologize to patient for the miscommunication with the test results

## 2025-05-13 NOTE — PATIENT INSTRUCTIONS

## 2025-05-14 ENCOUNTER — APPOINTMENT (OUTPATIENT)
Dept: CT IMAGING | Facility: HOSPITAL | Age: 55
End: 2025-05-14
Payer: COMMERCIAL

## 2025-05-14 ENCOUNTER — HOSPITAL ENCOUNTER (EMERGENCY)
Facility: HOSPITAL | Age: 55
Discharge: HOME | End: 2025-05-14
Attending: STUDENT IN AN ORGANIZED HEALTH CARE EDUCATION/TRAINING PROGRAM | Admitting: STUDENT IN AN ORGANIZED HEALTH CARE EDUCATION/TRAINING PROGRAM
Payer: COMMERCIAL

## 2025-05-14 ENCOUNTER — APPOINTMENT (OUTPATIENT)
Dept: GENERAL RADIOLOGY | Facility: HOSPITAL | Age: 55
End: 2025-05-14
Payer: COMMERCIAL

## 2025-05-14 VITALS
RESPIRATION RATE: 18 BRPM | TEMPERATURE: 99.4 F | HEIGHT: 65 IN | HEART RATE: 112 BPM | DIASTOLIC BLOOD PRESSURE: 71 MMHG | BODY MASS INDEX: 31.65 KG/M2 | OXYGEN SATURATION: 93 % | WEIGHT: 190 LBS | SYSTOLIC BLOOD PRESSURE: 119 MMHG

## 2025-05-14 DIAGNOSIS — N12 PYELONEPHRITIS: Primary | ICD-10-CM

## 2025-05-14 LAB
ALBUMIN SERPL-MCNC: 4.6 G/DL (ref 3.5–5.2)
ALBUMIN/GLOB SERPL: 1.8 G/DL
ALP SERPL-CCNC: 66 U/L (ref 39–117)
ALT SERPL W P-5'-P-CCNC: 32 U/L (ref 1–33)
ANION GAP SERPL CALCULATED.3IONS-SCNC: 13 MMOL/L (ref 5–15)
AST SERPL-CCNC: 24 U/L (ref 1–32)
B PARAPERT DNA SPEC QL NAA+PROBE: NOT DETECTED
B PERT DNA SPEC QL NAA+PROBE: NOT DETECTED
BACTERIA UR QL AUTO: ABNORMAL /HPF
BASOPHILS # BLD AUTO: 0.03 10*3/MM3 (ref 0–0.2)
BASOPHILS NFR BLD AUTO: 0.2 % (ref 0–1.5)
BILIRUB SERPL-MCNC: 0.7 MG/DL (ref 0–1.2)
BILIRUB UR QL STRIP: NEGATIVE
BUN SERPL-MCNC: 17 MG/DL (ref 6–20)
BUN/CREAT SERPL: 24.3 (ref 7–25)
C PNEUM DNA NPH QL NAA+NON-PROBE: NOT DETECTED
CALCIUM SPEC-SCNC: 9.4 MG/DL (ref 8.6–10.5)
CHLORIDE SERPL-SCNC: 102 MMOL/L (ref 98–107)
CLARITY UR: CLEAR
CO2 SERPL-SCNC: 27 MMOL/L (ref 22–29)
COLOR UR: YELLOW
CREAT SERPL-MCNC: 0.7 MG/DL (ref 0.57–1)
D-LACTATE SERPL-SCNC: 1.8 MMOL/L (ref 0.5–2)
DEPRECATED RDW RBC AUTO: 42.3 FL (ref 37–54)
EGFRCR SERPLBLD CKD-EPI 2021: 104.2 ML/MIN/1.73
EOSINOPHIL # BLD AUTO: 0.1 10*3/MM3 (ref 0–0.4)
EOSINOPHIL NFR BLD AUTO: 0.7 % (ref 0.3–6.2)
ERYTHROCYTE [DISTWIDTH] IN BLOOD BY AUTOMATED COUNT: 12.3 % (ref 12.3–15.4)
FLUAV SUBTYP SPEC NAA+PROBE: NOT DETECTED
FLUBV RNA ISLT QL NAA+PROBE: NOT DETECTED
GLOBULIN UR ELPH-MCNC: 2.6 GM/DL
GLUCOSE SERPL-MCNC: 139 MG/DL (ref 65–99)
GLUCOSE UR STRIP-MCNC: NEGATIVE MG/DL
HADV DNA SPEC NAA+PROBE: NOT DETECTED
HCOV 229E RNA SPEC QL NAA+PROBE: NOT DETECTED
HCOV HKU1 RNA SPEC QL NAA+PROBE: NOT DETECTED
HCOV NL63 RNA SPEC QL NAA+PROBE: NOT DETECTED
HCOV OC43 RNA SPEC QL NAA+PROBE: NOT DETECTED
HCT VFR BLD AUTO: 42.1 % (ref 34–46.6)
HGB BLD-MCNC: 14.1 G/DL (ref 12–15.9)
HGB UR QL STRIP.AUTO: NEGATIVE
HMPV RNA NPH QL NAA+NON-PROBE: NOT DETECTED
HOLD SPECIMEN: NORMAL
HPIV1 RNA ISLT QL NAA+PROBE: NOT DETECTED
HPIV2 RNA SPEC QL NAA+PROBE: NOT DETECTED
HPIV3 RNA NPH QL NAA+PROBE: NOT DETECTED
HPIV4 P GENE NPH QL NAA+PROBE: NOT DETECTED
IMM GRANULOCYTES # BLD AUTO: 0.05 10*3/MM3 (ref 0–0.05)
IMM GRANULOCYTES NFR BLD AUTO: 0.3 % (ref 0–0.5)
KETONES UR QL STRIP: NEGATIVE
LEUKOCYTE ESTERASE UR QL STRIP.AUTO: ABNORMAL
LYMPHOCYTES # BLD AUTO: 1.14 10*3/MM3 (ref 0.7–3.1)
LYMPHOCYTES NFR BLD AUTO: 7.8 % (ref 19.6–45.3)
M PNEUMO IGG SER IA-ACNC: NOT DETECTED
MCH RBC QN AUTO: 31.1 PG (ref 26.6–33)
MCHC RBC AUTO-ENTMCNC: 33.5 G/DL (ref 31.5–35.7)
MCV RBC AUTO: 92.7 FL (ref 79–97)
MONOCYTES # BLD AUTO: 0.18 10*3/MM3 (ref 0.1–0.9)
MONOCYTES NFR BLD AUTO: 1.2 % (ref 5–12)
NEUTROPHILS NFR BLD AUTO: 13.08 10*3/MM3 (ref 1.7–7)
NEUTROPHILS NFR BLD AUTO: 89.8 % (ref 42.7–76)
NITRITE UR QL STRIP: NEGATIVE
NRBC BLD AUTO-RTO: 0 /100 WBC (ref 0–0.2)
PH UR STRIP.AUTO: 7.5 [PH] (ref 5–8)
PLATELET # BLD AUTO: 362 10*3/MM3 (ref 140–450)
PMV BLD AUTO: 9.4 FL (ref 6–12)
POTASSIUM SERPL-SCNC: 3.6 MMOL/L (ref 3.5–5.2)
PROT SERPL-MCNC: 7.2 G/DL (ref 6–8.5)
PROT UR QL STRIP: ABNORMAL
RBC # BLD AUTO: 4.54 10*6/MM3 (ref 3.77–5.28)
RBC # UR STRIP: ABNORMAL /HPF
REF LAB TEST METHOD: ABNORMAL
RHINOVIRUS RNA SPEC NAA+PROBE: NOT DETECTED
RSV RNA NPH QL NAA+NON-PROBE: NOT DETECTED
SARS-COV-2 RNA RESP QL NAA+PROBE: NOT DETECTED
SODIUM SERPL-SCNC: 142 MMOL/L (ref 136–145)
SP GR UR STRIP: 1.01 (ref 1–1.03)
SQUAMOUS #/AREA URNS HPF: ABNORMAL /HPF
UROBILINOGEN UR QL STRIP: ABNORMAL
WBC # UR STRIP: ABNORMAL /HPF
WBC NRBC COR # BLD AUTO: 14.58 10*3/MM3 (ref 3.4–10.8)
WHOLE BLOOD HOLD COAG: NORMAL
WHOLE BLOOD HOLD SPECIMEN: NORMAL

## 2025-05-14 PROCEDURE — 25010000002 CEFTRIAXONE PER 250 MG: Performed by: STUDENT IN AN ORGANIZED HEALTH CARE EDUCATION/TRAINING PROGRAM

## 2025-05-14 PROCEDURE — 81001 URINALYSIS AUTO W/SCOPE: CPT | Performed by: STUDENT IN AN ORGANIZED HEALTH CARE EDUCATION/TRAINING PROGRAM

## 2025-05-14 PROCEDURE — 25510000001 IOPAMIDOL 61 % SOLUTION: Performed by: STUDENT IN AN ORGANIZED HEALTH CARE EDUCATION/TRAINING PROGRAM

## 2025-05-14 PROCEDURE — 0202U NFCT DS 22 TRGT SARS-COV-2: CPT | Performed by: STUDENT IN AN ORGANIZED HEALTH CARE EDUCATION/TRAINING PROGRAM

## 2025-05-14 PROCEDURE — 85025 COMPLETE CBC W/AUTO DIFF WBC: CPT | Performed by: STUDENT IN AN ORGANIZED HEALTH CARE EDUCATION/TRAINING PROGRAM

## 2025-05-14 PROCEDURE — 87086 URINE CULTURE/COLONY COUNT: CPT | Performed by: STUDENT IN AN ORGANIZED HEALTH CARE EDUCATION/TRAINING PROGRAM

## 2025-05-14 PROCEDURE — 96365 THER/PROPH/DIAG IV INF INIT: CPT

## 2025-05-14 PROCEDURE — 99285 EMERGENCY DEPT VISIT HI MDM: CPT | Performed by: STUDENT IN AN ORGANIZED HEALTH CARE EDUCATION/TRAINING PROGRAM

## 2025-05-14 PROCEDURE — 80053 COMPREHEN METABOLIC PANEL: CPT | Performed by: STUDENT IN AN ORGANIZED HEALTH CARE EDUCATION/TRAINING PROGRAM

## 2025-05-14 PROCEDURE — 71045 X-RAY EXAM CHEST 1 VIEW: CPT

## 2025-05-14 PROCEDURE — 83605 ASSAY OF LACTIC ACID: CPT | Performed by: STUDENT IN AN ORGANIZED HEALTH CARE EDUCATION/TRAINING PROGRAM

## 2025-05-14 PROCEDURE — 87040 BLOOD CULTURE FOR BACTERIA: CPT | Performed by: STUDENT IN AN ORGANIZED HEALTH CARE EDUCATION/TRAINING PROGRAM

## 2025-05-14 PROCEDURE — 74177 CT ABD & PELVIS W/CONTRAST: CPT

## 2025-05-14 PROCEDURE — 25810000003 SODIUM CHLORIDE 0.9 % SOLUTION: Performed by: STUDENT IN AN ORGANIZED HEALTH CARE EDUCATION/TRAINING PROGRAM

## 2025-05-14 RX ORDER — IOPAMIDOL 612 MG/ML
100 INJECTION, SOLUTION INTRAVASCULAR
Status: COMPLETED | OUTPATIENT
Start: 2025-05-14 | End: 2025-05-14

## 2025-05-14 RX ORDER — SODIUM CHLORIDE 0.9 % (FLUSH) 0.9 %
10 SYRINGE (ML) INJECTION AS NEEDED
Status: DISCONTINUED | OUTPATIENT
Start: 2025-05-14 | End: 2025-05-14 | Stop reason: HOSPADM

## 2025-05-14 RX ORDER — CEFDINIR 300 MG/1
300 CAPSULE ORAL 2 TIMES DAILY
Qty: 14 CAPSULE | Refills: 0 | Status: SHIPPED | OUTPATIENT
Start: 2025-05-14

## 2025-05-14 RX ADMIN — CEFTRIAXONE SODIUM 1000 MG: 1 INJECTION, POWDER, FOR SOLUTION INTRAMUSCULAR; INTRAVENOUS at 04:04

## 2025-05-14 RX ADMIN — SODIUM CHLORIDE 1000 ML: 9 INJECTION, SOLUTION INTRAVENOUS at 04:06

## 2025-05-14 RX ADMIN — IOPAMIDOL 100 ML: 612 INJECTION, SOLUTION INTRAVENOUS at 05:03

## 2025-05-14 NOTE — ED PROVIDER NOTES
Subjective   History of Present Illness  The patient is a 54-year-old female presenting to the ED with complaints of recurrent urinary tract infections and kidney stones. She reports experiencing episodes multiple times per week with pressure pain that comes and goes. She has been under the care of urology and her OBGYN, who have identified three persistent bacteria. The patient reports having fever, chills, and shaking this morning prior to arrival, along with episodes of vomiting. She denies visible hematuria but notes that lab testing has shown microscopic blood. Past medical history is significant for hypertension, hypercholesterolemia, prediabetes, diastasis recti, and diverticulosis. The patient recently completed a course of antibiotics and topical cream prescribed for UTI. She also reports recent illness with body aches and respiratory symptoms the week prior, which affected her family members as well.        Review of Systems    Past Medical History:   Diagnosis Date    Anxiety     GERD (gastroesophageal reflux disease)     Hiatal hernia     Hyperlipidemia     Hypothyroid     Obstructive sleep apnea     UTI (urinary tract infection)        Allergies   Allergen Reactions    Bactrim [Sulfamethoxazole-Trimethoprim] Anaphylaxis    Azithromycin Hives    Macrolides And Ketolides Dizziness     Short of breath       Past Surgical History:   Procedure Laterality Date    BREAST BIOPSY Left 2 yrs ago    CHOLECYSTECTOMY      COLONOSCOPY      ENDOMETRIAL ABLATION      ENDOSCOPY      FOOT ARTHRODESIS, MODIFIED DOUGLASS      HERNIA REPAIR      HIATAL HERNIA REPAIR N/A 5/2/2024    Procedure: LAPAROSCOPIC HIATAL HERNIA REPAIR WITH DAVINCI ROBOT;  Surgeon: Uli Marte MD;  Location: [facility];  Service: Robotics - DaVinci;  Laterality: N/A;    TUBAL ABDOMINAL LIGATION         Family History   Problem Relation Age of Onset    Hypertension Father     Heart disease Father     Alcohol abuse Father     Stroke Mother     COPD  Mother     Hypertension Brother     No Known Problems Daughter     No Known Problems Paternal Grandmother     No Known Problems Maternal Grandmother     Cancer Maternal Grandfather     No Known Problems Maternal Aunt     No Known Problems Paternal Aunt     BRCA 1/2 Neg Hx     Breast cancer Neg Hx     Colon cancer Neg Hx     Endometrial cancer Neg Hx     Ovarian cancer Neg Hx        Social History     Socioeconomic History    Marital status:    Tobacco Use    Smoking status: Never    Smokeless tobacco: Never   Vaping Use    Vaping status: Never Used   Substance and Sexual Activity    Alcohol use: No    Drug use: No    Sexual activity: Yes     Partners: Male     Birth control/protection: Surgical           Objective   Physical Exam    Constitutional:  General: Patient is not in acute distress.  Appearance: Patient is not diaphoretic.    HENT:  Head: Normocephalic and atraumatic.  Right Ear: External ear normal.  Left Ear: External ear normal.  Nose: Nose normal.    Eyes:  General: No scleral icterus.  Conjunctiva/sclera: Conjunctivae normal.    Cardiovascular:  Rate and Rhythm: Normal rate and regular rhythm.  Heart sounds: No friction rub.    Pulmonary:  Effort: Pulmonary effort is normal. No respiratory distress.  Breath sounds: **Normal breath sounds on auscultation. No stridor. No wheezing.**    Abdominal:  Palpations: **Abdomen is soft.**  Tenderness: **There is no significant tenderness to palpation. There is no guarding or rebound.** No CVA tenderness    Musculoskeletal:  General: No deformity.    Skin:  General: Skin is warm and dry. No rashes present. Normal color. Normal turgor.    Neurological:  General: No focal deficit present.  Mental Status: Alert and oriented    Procedures           ED Course  ED Course as of 05/15/25 0538   Wed May 14, 2025   0355 The patient has a history of left renal kidney stone, 0.2 cm, the patient does not have any flank pain in this visit.  No significant abdominal pain.   Physical exam is negative for abdominal tenderness, bladder tenderness, CVA tenderness.  I am not concerned for a kidney stone of the cause of her symptoms.  The patient does have dysuria, concerns for possible hematuria, which she has had in the past with urinary tract infections.  Because of elevated white count, elevated heart rate lactic acid and blood cultures were ordered.  X-ray of the chest was show to rule out pneumonia.  No clinical indication for CT abdomen pelvis at this time.  UA is concerning for moderate leukocytes, 2+ bacteria, elevated WBC concerning for UTI, in the setting of having urinary tract symptoms.  Patient has been treated with antibiotics in the past.  She will benefit from a urine culture.  Lactic acid in the emergency room was 1.8. [SG]   0405 X-ray chest wet read negative for acute findings. [SG]   0405 No RAVI present.  Chemistry reassuring. [SG]   0450 Heart rate is improved.  104 at the bedside.  Patient does report having some abdominal pain earlier today, she does have an elevated WBC.  She medical decision for CT abdomen and pelvis with contrast was discussed.  Patient agreeable.  The order was placed. [SG]   0532 CT concerning for left pyelonephritis.  No concerns for kidney stone as being the cause of the urinary tract infection.  The patient does not have any flank pain.  No CVA tenderness on exam.  She is nontoxic, no concerns for hypotension, no elevation of lactic acid.  Patient will benefit from outpatient cefdinir 300 mg twice daily for 7 days.  Close primary care doctor follow-up.  Return precautions to the emergency room discussed.  The patient feels good being discharged. At the bedside O2 sat 0f 95% on RA,no dyspnea, or difficulty breathing.  [SG]      ED Course User Index  [SG] Harrison Fisher MD                                                       Medical Decision Making  Patient presents with concerns for recurrent UTI with possible sepsis given reported fever  and chills.    Laboratory studies: Urinalysis was collected and appears visually clear. CBC, CMP, and lactic acid were ordered to evaluate for sepsis. Respiratory viral panel ordered to rule out concurrent viral illness.    The patient has one abnormal vital sign (tachycardia) but does not meet full sepsis criteria at this time. IV fluids were initiated for volume repletion and to address mild hypotension. Given the absence of CVA tenderness and soft abdomen on exam, immediate imaging was deferred pending laboratory results.    Problems Addressed:  Pyelonephritis: complicated acute illness or injury    Amount and/or Complexity of Data Reviewed  Labs: ordered.  Radiology: ordered.    Risk  Prescription drug management.        Final diagnoses:   Pyelonephritis       ED Disposition  ED Disposition       ED Disposition   Discharge    Condition   Stable    Comment   --               Rik Berry MD  [address] [facility] [id number] [phone]    Schedule an appointment as soon as possible for a visit            Medication List        New Prescriptions      cefdinir 300 MG capsule  Commonly known as: OMNICEF  Take 1 capsule by mouth 2 (Two) Times a Day.               Where to Get Your Medications        These medications were sent to [facility] - [place], [place] - [address] - [phone]  - [phone] FX  [address] [facility] [address]      Phone: [phone]   cefdinir 300 MG capsule            Harrison Fisher MD  05/15/25 3640

## 2025-05-15 LAB — BACTERIA SPEC AEROBE CULT: NO GROWTH

## 2025-05-19 LAB
BACTERIA SPEC AEROBE CULT: NORMAL
BACTERIA SPEC AEROBE CULT: NORMAL

## 2025-06-16 NOTE — PROGRESS NOTES
Subjective    Ms. Birch is 54 y.o. female    Chief Complaint: acute pyelonephritis    History of Present Illness    54-year-old female established patient in for ER follow-up regarding recent finding of left sided pyelonephritis 5/14/2025 after patient presented due to ongoing complaint of left-sided flank pain, suprapubic discomfort and pressure.  Had previously undergone multiple urinalyses with cultures through PCP as well as ER in the past and our office.  Was seen by myself prior to the ER visit where urine culture was sent through guidance UTI that revealed truly again a contaminant with group B strep, very small amount of Aerococcus and actinotignum.  I had not sent in Macrobid the following day after medication was sent in was when patient started to feel worse and presented to the ER where CT abdomen and pelvis with contrast was completed revealing left-sided retroperitoneal fat stranding around the renal pelvis and proximal ureter.  Was discharged on cefdinir x 7 days.  Reports even after completion of the cefdinir was seen in follow-up by her PCP where she has been treated with at least 1-2 additional antibiotics.  Patient overall finally reporting the most part is feeling better however is still experiencing mild suprapubic pressure discomfort.      The following portions of the patient's history were reviewed and updated as appropriate: allergies, current medications, past family history, past medical history, past social history, past surgical history and problem list.    Review of Systems   Constitutional:  Negative for chills and fever.   Gastrointestinal:  Negative for abdominal pain, anal bleeding and blood in stool.   Genitourinary:  Negative for dysuria and hematuria.         Current Outpatient Medications:     desvenlafaxine (PRISTIQ) 50 MG 24 hr tablet, Take 1 tablet by mouth Daily., Disp: , Rfl:     Estrogens Conjugated (Premarin) 0.625 MG/GM vaginal cream, Insert  into the vagina Daily.,  Disp: 30 g, Rfl: 0    levothyroxine (SYNTHROID, LEVOTHROID) 75 MCG tablet, Take 1 tablet by mouth Daily., Disp: , Rfl:     losartan-hydrochlorothiazide (HYZAAR) 50-12.5 MG per tablet, Take 1 tablet by mouth Daily., Disp: , Rfl:     multivitamin with minerals tablet tablet, Take 1 tablet by mouth Daily., Disp: , Rfl:     Omega-3 Fatty Acids (FISH OIL) 1000 MG capsule capsule, Take 2 capsules by mouth Daily With Breakfast., Disp: , Rfl:     omeprazole (priLOSEC) 40 MG capsule, Take 1 capsule by mouth Daily., Disp: , Rfl:     POTASSIUM GLUCONATE PO, Take  by mouth., Disp: , Rfl:     pravastatin (PRAVACHOL) 40 MG tablet, Take 1 tablet by mouth Every Night., Disp: , Rfl:     fexofenadine (Allegra Allergy) 180 MG tablet, Take 1 tablet by mouth Daily. (Patient not taking: Reported on 6/17/2025), Disp: 30 tablet, Rfl: 0    Past Medical History:   Diagnosis Date    Anxiety     GERD (gastroesophageal reflux disease)     Hiatal hernia     Hyperlipidemia     Hypothyroid     Obstructive sleep apnea     UTI (urinary tract infection)        Past Surgical History:   Procedure Laterality Date    BREAST BIOPSY Left 2 yrs ago    CHOLECYSTECTOMY      COLONOSCOPY      ENDOMETRIAL ABLATION      ENDOSCOPY      FOOT ARTHRODESIS, MODIFIED DOUGLASS      HERNIA REPAIR      HIATAL HERNIA REPAIR N/A 5/2/2024    Procedure: LAPAROSCOPIC HIATAL HERNIA REPAIR WITH FishkiINCI ROBOT;  Surgeon: Uli Marte MD;  Location: [facility];  Service: Robotics - DaVinci;  Laterality: N/A;    TUBAL ABDOMINAL LIGATION         Social History     Socioeconomic History    Marital status:    Tobacco Use    Smoking status: Never    Smokeless tobacco: Never   Vaping Use    Vaping status: Never Used   Substance and Sexual Activity    Alcohol use: No    Drug use: No    Sexual activity: Yes     Partners: Male     Birth control/protection: Surgical       Family History   Problem Relation Age of Onset    Hypertension Father     Heart disease Father     Alcohol  "abuse Father     Stroke Mother     COPD Mother     Hypertension Brother     No Known Problems Daughter     No Known Problems Paternal Grandmother     No Known Problems Maternal Grandmother     Cancer Maternal Grandfather     No Known Problems Maternal Aunt     No Known Problems Paternal Aunt     BRCA 1/2 Neg Hx     Breast cancer Neg Hx     Colon cancer Neg Hx     Endometrial cancer Neg Hx     Ovarian cancer Neg Hx        Objective    Temp 97 °F (36.1 °C)   Ht 165.1 cm (65\")   Wt 88.1 kg (194 lb 3.2 oz)   BMI 32.32 kg/m²     Physical Exam        Results for orders placed or performed in visit on 06/17/25   POC Urinalysis Dipstick, Multipro    Collection Time: 06/17/25  2:53 PM    Specimen: Urine   Result Value Ref Range    Color Yellow Yellow, Straw, Dark Yellow, Carly    Clarity, UA Cloudy (A) Clear    Glucose, UA Negative Negative mg/dL    Bilirubin Negative Negative    Ketones, UA 40 mg/dL (A) Negative    Specific Gravity  1.030 1.005 - 1.030    Blood, UA Negative Negative    pH, Urine 5.5 5.0 - 8.0    Protein, POC 30 mg/dL (A) Negative mg/dL    Urobilinogen, UA 0.2 E.U./dL Normal, 0.2 E.U./dL    Nitrite, UA Negative Negative    Leukocytes Small (1+) (A) Negative   CT Abdomen Pelvis With Contrast (05/14/2025 05:02)   Assessment and Plan    Diagnoses and all orders for this visit:    1. Kidney stone (Primary)  -     POC Urinalysis Dipstick, Multipro    2. Acute pyelonephritis  -     CT Abdomen Pelvis With Contrast; Future      Patient overall improved except still experiencing a mild suprapubic pressure/discomfort.  Will resend urine back out today through guidance UTI.  If patient continues to experience symptoms and is still not feeling well recommend return in 1 month with repeat CT imaging to make sure the left kidney has improved          "

## 2025-06-17 ENCOUNTER — OFFICE VISIT (OUTPATIENT)
Dept: UROLOGY | Facility: CLINIC | Age: 55
End: 2025-06-17
Payer: COMMERCIAL

## 2025-06-17 VITALS — HEIGHT: 65 IN | BODY MASS INDEX: 32.36 KG/M2 | WEIGHT: 194.2 LBS | TEMPERATURE: 97 F

## 2025-06-17 DIAGNOSIS — N10 ACUTE PYELONEPHRITIS: ICD-10-CM

## 2025-06-17 DIAGNOSIS — N20.0 KIDNEY STONE: Primary | ICD-10-CM

## 2025-06-17 LAB
BILIRUB BLD-MCNC: NEGATIVE MG/DL
CLARITY, POC: ABNORMAL
COLOR UR: YELLOW
GLUCOSE UR STRIP-MCNC: NEGATIVE MG/DL
KETONES UR QL: ABNORMAL
LEUKOCYTE EST, POC: ABNORMAL
NITRITE UR-MCNC: NEGATIVE MG/ML
PH UR: 5.5 [PH] (ref 5–8)
PROT UR STRIP-MCNC: ABNORMAL MG/DL
RBC # UR STRIP: NEGATIVE /UL
SP GR UR: 1.03 (ref 1–1.03)
UROBILINOGEN UR QL: ABNORMAL

## 2025-06-19 ENCOUNTER — TELEPHONE (OUTPATIENT)
Dept: UROLOGY | Facility: CLINIC | Age: 55
End: 2025-06-19
Payer: COMMERCIAL

## 2025-06-19 ENCOUNTER — HOSPITAL ENCOUNTER (OUTPATIENT)
Dept: CT IMAGING | Facility: HOSPITAL | Age: 55
Discharge: HOME | End: 2025-06-19
Admitting: PHYSICIAN ASSISTANT
Payer: COMMERCIAL

## 2025-06-19 DIAGNOSIS — N10 ACUTE PYELONEPHRITIS: ICD-10-CM

## 2025-06-19 PROCEDURE — 74177 CT ABD & PELVIS W/CONTRAST: CPT

## 2025-06-19 PROCEDURE — 25510000001 IOPAMIDOL 61 % SOLUTION: Performed by: PHYSICIAN ASSISTANT

## 2025-06-19 RX ORDER — IOPAMIDOL 612 MG/ML
100 INJECTION, SOLUTION INTRAVASCULAR
Status: COMPLETED | OUTPATIENT
Start: 2025-06-19 | End: 2025-06-19

## 2025-06-19 RX ADMIN — IOPAMIDOL 100 ML: 612 INJECTION, SOLUTION INTRAVENOUS at 13:35

## 2025-06-19 NOTE — TELEPHONE ENCOUNTER
Patient called stating she is feeling pressure, feels she has a UTI, she has been missing work, instructed patient that we are still waiting on pathnostic report on the Urine, Patient needs to schedule the CT and follow up with Elayne. Patient verbalized understanding and stated if she becomes worse that she will go to the ER.

## 2025-06-20 ENCOUNTER — HOSPITAL ENCOUNTER (INPATIENT)
Facility: HOSPITAL | Age: 55
LOS: 2 days | Discharge: HOME | End: 2025-06-23
Attending: FAMILY MEDICINE | Admitting: FAMILY MEDICINE
Payer: COMMERCIAL

## 2025-06-20 ENCOUNTER — TELEPHONE (OUTPATIENT)
Dept: UROLOGY | Facility: CLINIC | Age: 55
End: 2025-06-20
Payer: COMMERCIAL

## 2025-06-20 DIAGNOSIS — K57.92 ACUTE DIVERTICULITIS: Primary | ICD-10-CM

## 2025-06-20 DIAGNOSIS — N39.0 URINARY TRACT INFECTION WITHOUT HEMATURIA, SITE UNSPECIFIED: Primary | ICD-10-CM

## 2025-06-20 DIAGNOSIS — N39.0 URINARY TRACT INFECTION WITHOUT HEMATURIA, SITE UNSPECIFIED: ICD-10-CM

## 2025-06-20 LAB
ALBUMIN SERPL-MCNC: 4.5 G/DL (ref 3.5–5.2)
ALBUMIN/GLOB SERPL: 1.5 G/DL
ALP SERPL-CCNC: 61 U/L (ref 39–117)
ALT SERPL W P-5'-P-CCNC: 25 U/L (ref 1–33)
ANION GAP SERPL CALCULATED.3IONS-SCNC: 16 MMOL/L (ref 5–15)
AST SERPL-CCNC: 24 U/L (ref 1–32)
BACTERIA UR QL AUTO: ABNORMAL /HPF
BASOPHILS # BLD AUTO: 0.04 10*3/MM3 (ref 0–0.2)
BASOPHILS NFR BLD AUTO: 0.3 % (ref 0–1.5)
BILIRUB SERPL-MCNC: 0.6 MG/DL (ref 0–1.2)
BILIRUB UR QL STRIP: NEGATIVE
BUN SERPL-MCNC: 12.2 MG/DL (ref 6–20)
BUN/CREAT SERPL: 19.1 (ref 7–25)
CALCIUM SPEC-SCNC: 9.2 MG/DL (ref 8.6–10.5)
CHLORIDE SERPL-SCNC: 103 MMOL/L (ref 98–107)
CLARITY UR: CLEAR
CO2 SERPL-SCNC: 22 MMOL/L (ref 22–29)
COLOR UR: YELLOW
CREAT SERPL-MCNC: 0.64 MG/DL (ref 0.57–1)
D-LACTATE SERPL-SCNC: 0.9 MMOL/L (ref 0.5–2)
DEPRECATED RDW RBC AUTO: 43.1 FL (ref 37–54)
EGFRCR SERPLBLD CKD-EPI 2021: 106.5 ML/MIN/1.73
EOSINOPHIL # BLD AUTO: 0.08 10*3/MM3 (ref 0–0.4)
EOSINOPHIL NFR BLD AUTO: 0.6 % (ref 0.3–6.2)
ERYTHROCYTE [DISTWIDTH] IN BLOOD BY AUTOMATED COUNT: 12.9 % (ref 12.3–15.4)
GLOBULIN UR ELPH-MCNC: 3.1 GM/DL
GLUCOSE SERPL-MCNC: 110 MG/DL (ref 65–99)
GLUCOSE UR STRIP-MCNC: NEGATIVE MG/DL
HCT VFR BLD AUTO: 41.5 % (ref 34–46.6)
HGB BLD-MCNC: 14.1 G/DL (ref 12–15.9)
HGB UR QL STRIP.AUTO: NEGATIVE
HOLD SPECIMEN: NORMAL
HOLD SPECIMEN: NORMAL
HYALINE CASTS UR QL AUTO: ABNORMAL /LPF
IMM GRANULOCYTES # BLD AUTO: 0.05 10*3/MM3 (ref 0–0.05)
IMM GRANULOCYTES NFR BLD AUTO: 0.4 % (ref 0–0.5)
KETONES UR QL STRIP: ABNORMAL
LEUKOCYTE ESTERASE UR QL STRIP.AUTO: ABNORMAL
LIPASE SERPL-CCNC: 23 U/L (ref 13–60)
LYMPHOCYTES # BLD AUTO: 1.03 10*3/MM3 (ref 0.7–3.1)
LYMPHOCYTES NFR BLD AUTO: 7.5 % (ref 19.6–45.3)
MCH RBC QN AUTO: 31.3 PG (ref 26.6–33)
MCHC RBC AUTO-ENTMCNC: 34 G/DL (ref 31.5–35.7)
MCV RBC AUTO: 92.2 FL (ref 79–97)
MONOCYTES # BLD AUTO: 0.4 10*3/MM3 (ref 0.1–0.9)
MONOCYTES NFR BLD AUTO: 2.9 % (ref 5–12)
NEUTROPHILS NFR BLD AUTO: 12.15 10*3/MM3 (ref 1.7–7)
NEUTROPHILS NFR BLD AUTO: 88.3 % (ref 42.7–76)
NITRITE UR QL STRIP: NEGATIVE
NRBC BLD AUTO-RTO: 0 /100 WBC (ref 0–0.2)
PH UR STRIP.AUTO: <=5 [PH] (ref 5–8)
PLATELET # BLD AUTO: 304 10*3/MM3 (ref 140–450)
PMV BLD AUTO: 9.7 FL (ref 6–12)
POTASSIUM SERPL-SCNC: 3.5 MMOL/L (ref 3.5–5.2)
PROT SERPL-MCNC: 7.6 G/DL (ref 6–8.5)
PROT UR QL STRIP: NEGATIVE
RBC # BLD AUTO: 4.5 10*6/MM3 (ref 3.77–5.28)
RBC # UR STRIP: ABNORMAL /HPF
REF LAB TEST METHOD: ABNORMAL
SODIUM SERPL-SCNC: 141 MMOL/L (ref 136–145)
SP GR UR STRIP: 1.01 (ref 1–1.03)
SQUAMOUS #/AREA URNS HPF: ABNORMAL /HPF
UROBILINOGEN UR QL STRIP: ABNORMAL
WBC # UR STRIP: ABNORMAL /HPF
WBC NRBC COR # BLD AUTO: 13.75 10*3/MM3 (ref 3.4–10.8)
WHOLE BLOOD HOLD COAG: NORMAL
WHOLE BLOOD HOLD SPECIMEN: NORMAL

## 2025-06-20 PROCEDURE — 87086 URINE CULTURE/COLONY COUNT: CPT | Performed by: FAMILY MEDICINE

## 2025-06-20 PROCEDURE — 80053 COMPREHEN METABOLIC PANEL: CPT

## 2025-06-20 PROCEDURE — 83690 ASSAY OF LIPASE: CPT

## 2025-06-20 PROCEDURE — 85025 COMPLETE CBC W/AUTO DIFF WBC: CPT

## 2025-06-20 PROCEDURE — 36415 COLL VENOUS BLD VENIPUNCTURE: CPT

## 2025-06-20 PROCEDURE — 99285 EMERGENCY DEPT VISIT HI MDM: CPT | Performed by: FAMILY MEDICINE

## 2025-06-20 PROCEDURE — 83605 ASSAY OF LACTIC ACID: CPT

## 2025-06-20 PROCEDURE — 81001 URINALYSIS AUTO W/SCOPE: CPT

## 2025-06-20 RX ORDER — SODIUM CHLORIDE 0.9 % (FLUSH) 0.9 %
10 SYRINGE (ML) INJECTION AS NEEDED
Status: DISCONTINUED | OUTPATIENT
Start: 2025-06-20 | End: 2025-06-23 | Stop reason: HOSPADM

## 2025-06-20 RX ORDER — METRONIDAZOLE 500 MG/100ML
500 INJECTION, SOLUTION INTRAVENOUS ONCE
Status: COMPLETED | OUTPATIENT
Start: 2025-06-21 | End: 2025-06-21

## 2025-06-20 RX ORDER — NITROFURANTOIN 25; 75 MG/1; MG/1
100 CAPSULE ORAL 2 TIMES DAILY
Qty: 20 CAPSULE | Refills: 0 | Status: SHIPPED | OUTPATIENT
Start: 2025-06-20 | End: 2025-06-23 | Stop reason: HOSPADM

## 2025-06-20 NOTE — TELEPHONE ENCOUNTER
Spoke with patient to let her know  I reviewed the Pathnostics and they grew multiple bacteria however some of the bacteria is most likely contaminant so antibiotic was sensitive to the bacteria most commonly seen in UTIs I sent in a prescription for Macrobid 100 mg twice daily for 10 days.     Patient verbalized understanding.

## 2025-06-21 PROBLEM — K57.92 ACUTE DIVERTICULITIS: Status: ACTIVE | Noted: 2025-06-21

## 2025-06-21 PROCEDURE — 25010000002 ENOXAPARIN PER 10 MG: Performed by: FAMILY MEDICINE

## 2025-06-21 PROCEDURE — 25010000002 METRONIDAZOLE 500 MG/100ML SOLUTION: Performed by: FAMILY MEDICINE

## 2025-06-21 PROCEDURE — 25010000002 CEFTRIAXONE PER 250 MG: Performed by: FAMILY MEDICINE

## 2025-06-21 PROCEDURE — 25810000003 SODIUM CHLORIDE 0.9 % SOLUTION: Performed by: FAMILY MEDICINE

## 2025-06-21 PROCEDURE — 25010000002 FAMOTIDINE 10 MG/ML SOLUTION: Performed by: FAMILY MEDICINE

## 2025-06-21 PROCEDURE — 25010000002 MORPHINE PER 10 MG: Performed by: FAMILY MEDICINE

## 2025-06-21 RX ORDER — LEVOTHYROXINE SODIUM 75 UG/1
75 TABLET ORAL DAILY
Status: DISCONTINUED | OUTPATIENT
Start: 2025-06-21 | End: 2025-06-23 | Stop reason: HOSPADM

## 2025-06-21 RX ORDER — DESVENLAFAXINE 50 MG/1
50 TABLET, FILM COATED, EXTENDED RELEASE ORAL DAILY
Status: DISCONTINUED | OUTPATIENT
Start: 2025-06-21 | End: 2025-06-23 | Stop reason: HOSPADM

## 2025-06-21 RX ORDER — HYDROCODONE BITARTRATE AND ACETAMINOPHEN 5; 325 MG/1; MG/1
1 TABLET ORAL EVERY 6 HOURS PRN
Refills: 0 | Status: DISCONTINUED | OUTPATIENT
Start: 2025-06-21 | End: 2025-06-23 | Stop reason: HOSPADM

## 2025-06-21 RX ORDER — MORPHINE SULFATE 2 MG/ML
0.5 INJECTION, SOLUTION INTRAMUSCULAR; INTRAVENOUS EVERY 4 HOURS PRN
Status: DISCONTINUED | OUTPATIENT
Start: 2025-06-21 | End: 2025-06-21

## 2025-06-21 RX ORDER — FAMOTIDINE 10 MG/ML
20 INJECTION, SOLUTION INTRAVENOUS EVERY 12 HOURS SCHEDULED
Status: DISCONTINUED | OUTPATIENT
Start: 2025-06-21 | End: 2025-06-23 | Stop reason: HOSPADM

## 2025-06-21 RX ORDER — SODIUM CHLORIDE 9 MG/ML
75 INJECTION, SOLUTION INTRAVENOUS CONTINUOUS
Status: DISPENSED | OUTPATIENT
Start: 2025-06-21 | End: 2025-06-21

## 2025-06-21 RX ORDER — SODIUM CHLORIDE 9 MG/ML
75 INJECTION, SOLUTION INTRAVENOUS CONTINUOUS
Status: DISCONTINUED | OUTPATIENT
Start: 2025-06-21 | End: 2025-06-22

## 2025-06-21 RX ORDER — ONDANSETRON 2 MG/ML
4 INJECTION INTRAMUSCULAR; INTRAVENOUS EVERY 6 HOURS PRN
Status: DISCONTINUED | OUTPATIENT
Start: 2025-06-21 | End: 2025-06-23 | Stop reason: HOSPADM

## 2025-06-21 RX ORDER — COLESTIPOL HYDROCHLORIDE 1 G/1
1 TABLET ORAL 2 TIMES DAILY
Status: ON HOLD | COMMUNITY
End: 2025-06-23

## 2025-06-21 RX ORDER — METRONIDAZOLE 500 MG/100ML
500 INJECTION, SOLUTION INTRAVENOUS EVERY 8 HOURS
Status: DISCONTINUED | OUTPATIENT
Start: 2025-06-21 | End: 2025-06-23 | Stop reason: HOSPADM

## 2025-06-21 RX ORDER — ENOXAPARIN SODIUM 100 MG/ML
40 INJECTION SUBCUTANEOUS EVERY 24 HOURS
Status: DISCONTINUED | OUTPATIENT
Start: 2025-06-21 | End: 2025-06-23 | Stop reason: HOSPADM

## 2025-06-21 RX ORDER — CETIRIZINE HYDROCHLORIDE 10 MG/1
10 TABLET ORAL DAILY
COMMUNITY

## 2025-06-21 RX ORDER — ONDANSETRON 4 MG/1
4 TABLET, ORALLY DISINTEGRATING ORAL EVERY 6 HOURS PRN
Status: DISCONTINUED | OUTPATIENT
Start: 2025-06-21 | End: 2025-06-23 | Stop reason: HOSPADM

## 2025-06-21 RX ADMIN — Medication 10 ML: at 21:31

## 2025-06-21 RX ADMIN — FAMOTIDINE 20 MG: 10 INJECTION INTRAVENOUS at 09:18

## 2025-06-21 RX ADMIN — CEFTRIAXONE SODIUM 1000 MG: 1 INJECTION, POWDER, FOR SOLUTION INTRAMUSCULAR; INTRAVENOUS at 00:09

## 2025-06-21 RX ADMIN — METRONIDAZOLE 500 MG: 500 INJECTION, SOLUTION INTRAVENOUS at 00:25

## 2025-06-21 RX ADMIN — LEVOTHYROXINE SODIUM 75 MCG: 0.07 TABLET ORAL at 09:18

## 2025-06-21 RX ADMIN — HYDROCODONE BITARTRATE AND ACETAMINOPHEN 1 TABLET: 5; 325 TABLET ORAL at 09:22

## 2025-06-21 RX ADMIN — HYDROCODONE BITARTRATE AND ACETAMINOPHEN 1 TABLET: 5; 325 TABLET ORAL at 21:30

## 2025-06-21 RX ADMIN — CEFTRIAXONE SODIUM 1000 MG: 1 INJECTION, POWDER, FOR SOLUTION INTRAMUSCULAR; INTRAVENOUS at 22:49

## 2025-06-21 RX ADMIN — ENOXAPARIN SODIUM 40 MG: 100 INJECTION SUBCUTANEOUS at 09:18

## 2025-06-21 RX ADMIN — METRONIDAZOLE 500 MG: 500 INJECTION, SOLUTION INTRAVENOUS at 18:35

## 2025-06-21 RX ADMIN — MORPHINE SULFATE 0.5 MG: 2 INJECTION, SOLUTION INTRAMUSCULAR; INTRAVENOUS at 04:54

## 2025-06-21 RX ADMIN — METRONIDAZOLE 500 MG: 500 INJECTION, SOLUTION INTRAVENOUS at 09:18

## 2025-06-21 RX ADMIN — SODIUM CHLORIDE 75 ML/HR: 9 INJECTION, SOLUTION INTRAVENOUS at 02:55

## 2025-06-21 RX ADMIN — FAMOTIDINE 20 MG: 10 INJECTION INTRAVENOUS at 21:31

## 2025-06-21 RX ADMIN — SODIUM CHLORIDE 1000 ML: 9 INJECTION, SOLUTION INTRAVENOUS at 01:42

## 2025-06-21 RX ADMIN — DESVENLAFAXINE SUCCINATE 50 MG: 50 TABLET, EXTENDED RELEASE ORAL at 09:18

## 2025-06-21 NOTE — PLAN OF CARE
Goal Outcome Evaluation: medicated once for complaints of pain this shift, good relief noted. Patient advanced to clear liquids this shift. Patient is tolerating clear liquids at this time. Family at bedside this shift. Patient safety to be maintained this shift, continue to monitor and report abnormal to provider.

## 2025-06-21 NOTE — H&P
History and Physical      CHIEF COMPLAINT:  Abdominal Pain    Reason for Admission:  Abdominal Pain, Diverticulitis    History Obtained From:  patient, chart    HISTORY OF PRESENT ILLNESS:      The patient is a 54 y.o. female who was admitted from ER with acute diverticulitis. She has had lower abdominal pain and thought she had a UTI. She has had some nausea. No vomiting. No changes in B. No fevers. No CP or SOA.     Past Medical History:    Past Medical History:   Diagnosis Date    Anxiety     GERD (gastroesophageal reflux disease)     Hiatal hernia     Hyperlipidemia     Hypothyroid     Obstructive sleep apnea     UTI (urinary tract infection)      Past Surgical History:    Past Surgical History:   Procedure Laterality Date    BREAST BIOPSY Left 2 yrs ago    CHOLECYSTECTOMY      COLONOSCOPY      ENDOMETRIAL ABLATION      ENDOSCOPY      FOOT ARTHRODESIS, MODIFIED DOUGLASS      HERNIA REPAIR      HIATAL HERNIA REPAIR N/A 5/2/2024    Procedure: LAPAROSCOPIC HIATAL HERNIA REPAIR WITH DAVINCI ROBOT;  Surgeon: Uli Marte MD;  Location: [facility];  Service: Robotics - DaVinci;  Laterality: N/A;    TUBAL ABDOMINAL LIGATION           Medications Prior to Admission:    Medications Prior to Admission   Medication Sig Dispense Refill Last Dose/Taking    desvenlafaxine (PRISTIQ) 50 MG 24 hr tablet Take 1 tablet by mouth Daily.       Estrogens Conjugated (Premarin) 0.625 MG/GM vaginal cream Insert  into the vagina Daily. 30 g 0     fexofenadine (Allegra Allergy) 180 MG tablet Take 1 tablet by mouth Daily. (Patient not taking: Reported on 6/17/2025) 30 tablet 0     levothyroxine (SYNTHROID, LEVOTHROID) 75 MCG tablet Take 1 tablet by mouth Daily.       losartan-hydrochlorothiazide (HYZAAR) 50-12.5 MG per tablet Take 1 tablet by mouth Daily.       multivitamin with minerals tablet tablet Take 1 tablet by mouth Daily.       nitrofurantoin, macrocrystal-monohydrate, (Macrobid) 100 MG capsule Take 1 capsule by mouth 2 (Two)  "Times a Day for 10 days. 20 capsule 0     Omega-3 Fatty Acids (FISH OIL) 1000 MG capsule capsule Take 2 capsules by mouth Daily With Breakfast.       omeprazole (priLOSEC) 40 MG capsule Take 1 capsule by mouth Daily.       POTASSIUM GLUCONATE PO Take  by mouth.       pravastatin (PRAVACHOL) 40 MG tablet Take 1 tablet by mouth Every Night.          Allergies:  Bactrim [sulfamethoxazole-trimethoprim], Azithromycin, and Macrolides and ketolides    Social History:   TOBACCO:   reports that she has never smoked. She has never used smokeless tobacco.  ETOH:   reports no history of alcohol use.  DRUGS:   reports no history of drug use.      Family History:   Family History   Problem Relation Age of Onset    Hypertension Father     Heart disease Father     Alcohol abuse Father     Stroke Mother     COPD Mother     Hypertension Brother     No Known Problems Daughter     No Known Problems Paternal Grandmother     No Known Problems Maternal Grandmother     Cancer Maternal Grandfather     No Known Problems Maternal Aunt     No Known Problems Paternal Aunt     BRCA 1/2 Neg Hx     Breast cancer Neg Hx     Colon cancer Neg Hx     Endometrial cancer Neg Hx     Ovarian cancer Neg Hx      REVIEW OF SYSTEMS:  Constitutional: Negative   CV: Negative  Pulmonary: Negative  GI: abdominal pain, nausea  : Negative  Psych: Negative  Neuro: Negative  Skin: Negative  MusculoSkeletal: Negative  HEENT: Negative  Joints: Negative  Vitals:  /60 (BP Location: Left arm, Patient Position: Lying)   Pulse 96   Temp 98.6 °F (37 °C) (Oral)   Resp 16   Ht 165.1 cm (65\")   Wt 84.8 kg (187 lb)   SpO2 93%   BMI 31.12 kg/m²     PHYSICAL EXAM:  Gen: NAD, alert, pleasant  HEENT: WNL  Lymph: no LAD  Neck: no JVD or masses  Chest: CTA bilaterally  CV: RRR  Abdomen: NT/ND  Extrem: no C/C/E  Neuro: Nonfocal  Skin: no rashes  Joints: no redness  DATA:  I have reviewed the admission labs and imaging tests.    ASSESSMENT AND PLAN:      Acute " Diverticulitis--continue antibiotics, ok for clear liquids and follow  Hypothyroidism--resume PO meds  Depression      Natalie Fung MD  08:27 CDT 6/21/2025

## 2025-06-21 NOTE — PLAN OF CARE
Goal Outcome Evaluation:  Plan of Care Reviewed With: patient      Progress: no change     Patient admitted w/ diverticulitis. A&O x4. No c/o pain thus far. IVF per order. NPO. Up ad ryan. Rm air. VSS. Safety maintained.

## 2025-06-21 NOTE — ED PROVIDER NOTES
HPI:       Patient is a 54-year-old white female presents to the emergency room with left flank pain and left upper quadrant abdominal pain left lower quadrant abdominal pain.  Patient has a history of urinary tract infection has had numerous antibiotics.  However patient's pain got worse today she had a CT scan that showed acute diverticulitis no perforation.  She is here for admission and treatment for her diverticulitis.  Patient's pain is 6 out of 10.  Patient just started Macrobid for UTI today.    REVIEW OF SYSTEMS  CONSTITUTIONAL:  No complaints of fever, chills,or weakness  EYES:  No complaints of discharge   ENT: No complaints of sore throat or ear pain  CARDIOVASCULAR:  No complaints of chest pain, palpitations, or swelling  RESPIRATORY:  No complaints of cough or shortness of breath  GI: Positive for left lower quadrant pain.  MUSCULOSKELETAL:  No complaints of back pain  SKIN:  No complaints of rash  NEUROLOGIC:  No complaints of headache, focal weakness, or sensory changes  ENDOCRINE:  No complaints of polyuria or polydipsia  LYMPHATIC:  No complaints of swollen glands  GENITOURINARY: Positive for left flank pain and urinary frequency and urgency      PAST MEDICAL HISTORY  Past Medical History:   Diagnosis Date    Anxiety     GERD (gastroesophageal reflux disease)     Hiatal hernia     Hyperlipidemia     Hypothyroid     Obstructive sleep apnea     UTI (urinary tract infection)        FAMILY HISTORY  Family History   Problem Relation Age of Onset    Hypertension Father     Heart disease Father     Alcohol abuse Father     Stroke Mother     COPD Mother     Hypertension Brother     No Known Problems Daughter     No Known Problems Paternal Grandmother     No Known Problems Maternal Grandmother     Cancer Maternal Grandfather     No Known Problems Maternal Aunt     No Known Problems Paternal Aunt     BRCA 1/2 Neg Hx     Breast cancer Neg Hx     Colon cancer Neg Hx     Endometrial cancer Neg Hx     Ovarian  cancer Neg Hx        SOCIAL HISTORY  Social History     Socioeconomic History    Marital status:    Tobacco Use    Smoking status: Never    Smokeless tobacco: Never   Vaping Use    Vaping status: Never Used   Substance and Sexual Activity    Alcohol use: No    Drug use: No    Sexual activity: Yes     Partners: Male     Birth control/protection: Surgical       IMMUNIZATION HISTORY  Deferred to primary care physician.    SURGICAL HISTORY  Past Surgical History:   Procedure Laterality Date    BREAST BIOPSY Left 2 yrs ago    CHOLECYSTECTOMY      COLONOSCOPY      ENDOMETRIAL ABLATION      ENDOSCOPY      FOOT ARTHRODESIS, MODIFIED DOUGLASS      HERNIA REPAIR      HIATAL HERNIA REPAIR N/A 5/2/2024    Procedure: LAPAROSCOPIC HIATAL HERNIA REPAIR WITH DAVINCI ROBOT;  Surgeon: Uli Marte MD;  Location: [facility] OR;  Service: Robotics - DaVinci;  Laterality: N/A;    TUBAL ABDOMINAL LIGATION         CURRENT MEDICATIONS    Current Facility-Administered Medications:     cefTRIAXone (ROCEPHIN) 1,000 mg in sodium chloride 0.9 % 100 mL MBP, 1,000 mg, Intravenous, Q24H, Natalie Fung MD    desvenlafaxine (PRISTIQ) 24 hr tablet 50 mg, 50 mg, Oral, Daily, Natalie Fung MD, 50 mg at 06/21/25 0918    enoxaparin sodium (LOVENOX) syringe 40 mg, 40 mg, Subcutaneous, Q24H, Natalie Fung MD, 40 mg at 06/21/25 0918    famotidine (PEPCID) injection 20 mg, 20 mg, Intravenous, Q12H, Natalie Fung MD, 20 mg at 06/21/25 0918    HYDROcodone-acetaminophen (NORCO) 5-325 MG per tablet 1 tablet, 1 tablet, Oral, Q6H PRN, Natalie Fung MD, 1 tablet at 06/21/25 0922    levothyroxine (SYNTHROID, LEVOTHROID) tablet 75 mcg, 75 mcg, Oral, Daily, Natalie Fung MD, 75 mcg at 06/21/25 0918    metroNIDAZOLE (FLAGYL) IVPB 500 mg, 500 mg, Intravenous, Q8H, Natalie Fung MD, Last Rate: 200 mL/hr at 06/21/25 1835, 500 mg at 06/21/25 1835    ondansetron (ZOFRAN) injection 4 mg, 4 mg, Intravenous, Q6H  "PRN, Natalie Fung MD    ondansetron ODT (ZOFRAN-ODT) disintegrating tablet 4 mg, 4 mg, Translingual, Q6H PRN, Natalie Fung MD    sodium chloride 0.9 % flush 10 mL, 10 mL, Intravenous, PRN, Emergency, Triage Protocol, MD    sodium chloride 0.9 % infusion, 75 mL/hr, Intravenous, Continuous, Natalie Fung MD, Last Rate: 75 mL/hr at 06/21/25 0902, 75 mL/hr at 06/21/25 0902    ALLERGIES  Allergies   Allergen Reactions    Bactrim [Sulfamethoxazole-Trimethoprim] Anaphylaxis    Azithromycin Hives    Macrolides And Ketolides Dizziness     Short of breath       ABDOMINAL EXAM    VITAL SIGNS:   /65 (BP Location: Right arm, Patient Position: Lying)   Pulse 73   Temp 97.9 °F (36.6 °C) (Oral)   Resp 16   Ht 165.1 cm (65\")   Wt 84.8 kg (187 lb)   SpO2 95%   BMI 31.12 kg/m²     Constitutional: Patient is alert and in no distress.  Patient with moderate abdominal and left flank discomfort.    ENT: There is a normal pharynx with no acute erythema or exudate and oral mucosa is moist.  Nose is clear with no drainage.  Tympanic membranes intact and non-erythemic    Cardiovascular: S1-S2 regular rate and rhythm. No murmurs, rubs or gallops.  Pulses are equal bilaterally and there is no pitting edema.    Respiratory: Patient is clear to auscultation bilaterally with no wheezing or rhonchi.  Chest wall is nontender.  There are no external lesions on the chest.  There is no crepitance.    Gastrointestinal: Tender palpation left lower quadrant of the abdomen but no abdominal distention no fluid wave.  Bowel sounds normal in all 4 quadrants.    Genitourinary: Mild left CVA tenderness but no CVA tenderness on percussion on the right.      Integument: No acute lesions noted.  Color appears to be normal.    [place] Coma Scale: Total score 15    Neurological: Patient is alert and oriented x4 and no acute findings noted.  Speech is fluent and cognition is normal.  No evidence of acute CVA.  Cranial nerves II " through XII intact.  Patient with normal motor function as well as reflexes and sensation    Psychiatric: Normal affect and mood.            RADIOLOGY/PROCEDURES        No orders to display          FUTURE APPOINTMENTS     Future Appointments   Date Time Provider Department Center   7/7/2025  3:00 PM Esthela Cole MD [facility]   7/17/2025  3:15 PM Elayne Tillman APRN [facility]   8/4/2025  3:00 PM Elayne Tillman APRN [facility]   5/11/2026  8:00 AM Rosanne Cardoza APRN [facility]            COURSE & MEDICAL DECISION MAKING       Patient's partial differential diagnosis can include:    diverticulitis, gastritis, gastroenteritis, colitis, enteritis, cholecystitis, pancreatitis, GERD,  Acute appendicitis,partial bowel obstruction, bowel obstruction,volvulus, intussusception, constipation, irritable bowel,  diverticulosis, AAA, peptic ulcer disease, perforated esophagus,  mesenteric adenitis, mesenteric ischemia, Crohn's disease, ulcerative colitis, perforated peptic ulcer,celiac disease, esophageal spasms, gastroparesis, and others      CBC, CMP, lactate, lipase, urinalysis.    Will give Rocephin and Flagyl IV to help treat her as acute diverticulitis.  The Rocephin should also help with the urinalysis that is also showing signs of urinary tract infection.  Will call with Dr. Berry who is a patient PCP for admission.  Patient was  Accepted by Dr. Dr. Berry for admission    Patient's level of risk: Moderate        CRITICAL CARE    CRITICAL CARE: no    CRITICAL CARE TIME: None      Recent Results (from the past 24 hours)   Comprehensive Metabolic Panel    Collection Time: 06/20/25  9:46 PM    Specimen: Arm, Left; Blood   Result Value Ref Range    Glucose 110 (H) 65 - 99 mg/dL    BUN 12.2 6.0 - 20.0 mg/dL    Creatinine 0.64 0.57 - 1.00 mg/dL    Sodium 141 136 - 145 mmol/L    Potassium 3.5 3.5 - 5.2 mmol/L    Chloride 103 98 - 107 mmol/L    CO2 22.0 22.0 - 29.0 mmol/L     Calcium 9.2 8.6 - 10.5 mg/dL    Total Protein 7.6 6.0 - 8.5 g/dL    Albumin 4.5 3.5 - 5.2 g/dL    ALT (SGPT) 25 1 - 33 U/L    AST (SGOT) 24 1 - 32 U/L    Alkaline Phosphatase 61 39 - 117 U/L    Total Bilirubin 0.6 0.0 - 1.2 mg/dL    Globulin 3.1 gm/dL    A/G Ratio 1.5 g/dL    BUN/Creatinine Ratio 19.1 7.0 - 25.0    Anion Gap 16.0 (H) 5.0 - 15.0 mmol/L    eGFR 106.5 >60.0 mL/min/1.73   Lipase    Collection Time: 06/20/25  9:46 PM    Specimen: Arm, Left; Blood   Result Value Ref Range    Lipase 23 13 - 60 U/L   Lactic Acid, Plasma    Collection Time: 06/20/25  9:46 PM    Specimen: Arm, Left; Blood   Result Value Ref Range    Lactate 0.9 0.5 - 2.0 mmol/L   Green Top (Gel)    Collection Time: 06/20/25  9:46 PM   Result Value Ref Range    Extra Tube Hold for add-ons.    Lavender Top    Collection Time: 06/20/25  9:46 PM   Result Value Ref Range    Extra Tube hold for add-on    Red Top    Collection Time: 06/20/25  9:46 PM   Result Value Ref Range    Extra Tube Hold for add-ons.    Light Blue Top    Collection Time: 06/20/25  9:46 PM   Result Value Ref Range    Extra Tube Hold for add-ons.    CBC Auto Differential    Collection Time: 06/20/25  9:46 PM    Specimen: Arm, Left; Blood   Result Value Ref Range    WBC 13.75 (H) 3.40 - 10.80 10*3/mm3    RBC 4.50 3.77 - 5.28 10*6/mm3    Hemoglobin 14.1 12.0 - 15.9 g/dL    Hematocrit 41.5 34.0 - 46.6 %    MCV 92.2 79.0 - 97.0 fL    MCH 31.3 26.6 - 33.0 pg    MCHC 34.0 31.5 - 35.7 g/dL    RDW 12.9 12.3 - 15.4 %    RDW-SD 43.1 37.0 - 54.0 fl    MPV 9.7 6.0 - 12.0 fL    Platelets 304 140 - 450 10*3/mm3    Neutrophil % 88.3 (H) 42.7 - 76.0 %    Lymphocyte % 7.5 (L) 19.6 - 45.3 %    Monocyte % 2.9 (L) 5.0 - 12.0 %    Eosinophil % 0.6 0.3 - 6.2 %    Basophil % 0.3 0.0 - 1.5 %    Immature Grans % 0.4 0.0 - 0.5 %    Neutrophils, Absolute 12.15 (H) 1.70 - 7.00 10*3/mm3    Lymphocytes, Absolute 1.03 0.70 - 3.10 10*3/mm3    Monocytes, Absolute 0.40 0.10 - 0.90 10*3/mm3    Eosinophils,  Absolute 0.08 0.00 - 0.40 10*3/mm3    Basophils, Absolute 0.04 0.00 - 0.20 10*3/mm3    Immature Grans, Absolute 0.05 0.00 - 0.05 10*3/mm3    nRBC 0.0 0.0 - 0.2 /100 WBC   Urinalysis With Microscopic If Indicated (No Culture) - Urine, Clean Catch    Collection Time: 06/20/25 11:35 PM    Specimen: Urine, Clean Catch   Result Value Ref Range    Color, UA Yellow Yellow, Straw    Appearance, UA Clear Clear    pH, UA <=5.0 5.0 - 8.0    Specific Gravity, UA 1.014 1.005 - 1.030    Glucose, UA Negative Negative    Ketones, UA 15 mg/dL (1+) (A) Negative    Bilirubin, UA Negative Negative    Blood, UA Negative Negative    Protein, UA Negative Negative    Leuk Esterase, UA Moderate (2+) (A) Negative    Nitrite, UA Negative Negative    Urobilinogen, UA 1.0 E.U./dL 0.2 - 1.0 E.U./dL   Urinalysis, Microscopic Only - Urine, Clean Catch    Collection Time: 06/20/25 11:35 PM    Specimen: Urine, Clean Catch   Result Value Ref Range    RBC, UA 0-2 None Seen, 0-2 /HPF    WBC, UA 21-50 (A) None Seen, 0-2 /HPF    Bacteria, UA Trace (A) None Seen /HPF    Squamous Epithelial Cells, UA 3-6 (A) None Seen, 0-2 /HPF    Hyaline Casts, UA None Seen None Seen /LPF    Methodology Automated Microscopy           Old charts were reviewed per [facility] EMR.  Pertinent details are summarized above.  All laboratory, radiologic, and EKG studies that were performed in the Emergency Department were a necessary part of the evaluation needed to exclude unstable or  emergent medical conditions.     Patient was hemodynamically and neurologically stable in the ED.   Pertinent studies were reviewed as above.     The patient received:  Medications   sodium chloride 0.9 % flush 10 mL (has no administration in time range)   sodium chloride 0.9 % infusion (0 mL/hr Intravenous Stopped 6/21/25 0902)   ondansetron (ZOFRAN) injection 4 mg (has no administration in time range)   ondansetron ODT (ZOFRAN-ODT) disintegrating tablet 4 mg (has no administration in time range)    cefTRIAXone (ROCEPHIN) 1,000 mg in sodium chloride 0.9 % 100 mL MBP (has no administration in time range)   metroNIDAZOLE (FLAGYL) IVPB 500 mg (500 mg Intravenous New Bag 6/21/25 1835)   sodium chloride 0.9 % infusion (75 mL/hr Intravenous Currently Infusing 6/21/25 0902)   enoxaparin sodium (LOVENOX) syringe 40 mg (40 mg Subcutaneous Given 6/21/25 0918)   famotidine (PEPCID) injection 20 mg (20 mg Intravenous Given 6/21/25 0918)   desvenlafaxine (PRISTIQ) 24 hr tablet 50 mg (50 mg Oral Given 6/21/25 0918)   levothyroxine (SYNTHROID, LEVOTHROID) tablet 75 mcg (75 mcg Oral Given 6/21/25 0918)   HYDROcodone-acetaminophen (NORCO) 5-325 MG per tablet 1 tablet (1 tablet Oral Given 6/21/25 0922)   metroNIDAZOLE (FLAGYL) IVPB 500 mg (0 mg Intravenous Stopped 6/21/25 0142)   cefTRIAXone (ROCEPHIN) 1,000 mg in sodium chloride 0.9 % 100 mL MBP (0 mg Intravenous Stopped 6/21/25 0025)   sodium chloride 0.9 % bolus 1,000 mL (1,000 mL Intravenous New Bag 6/21/25 0142)            Diagnoses that have been ruled out:   None   Diagnoses that are still under consideration:   None   Final diagnoses:   Acute diverticulitis   Urinary tract infection without hematuria, site unspecified        FINAL IMPRESSION   Diagnosis Plan   1. Acute diverticulitis      Sigmoid colon      2. Urinary tract infection without hematuria, site unspecified              Kj Calderon Jr, MD        ED Disposition       ED Disposition   Decision to Admit    Condition   --    Comment   Level of Care: Med/Surg [1]   Diagnosis: Acute diverticulitis [[id number]]   Admitting Physician: JUAQUIN SWAN [[id number]]   Attending Physician: JUAQUIN SWAN [[id number]]   Certification: I Certify That Inpatient Hospital Services Are Medically Necessary For Greater Than 2 Midnights                   Dragon disclaimer:  Part of this note may be an electronic transcription/translation of spoken language to printed text using the Dragon Dictation System.     I have reviewed  the patient’s prescription history via a prescription monitoring program.  This information is consistent with my knowledge of the patient’s controlled substance use history.        Kj Calderon Jr., MD  06/21/25 2037

## 2025-06-22 LAB
ALBUMIN SERPL-MCNC: 3.7 G/DL (ref 3.5–5.2)
ALBUMIN/GLOB SERPL: 1.6 G/DL
ALP SERPL-CCNC: 48 U/L (ref 39–117)
ALT SERPL W P-5'-P-CCNC: 23 U/L (ref 1–33)
ANION GAP SERPL CALCULATED.3IONS-SCNC: 13 MMOL/L (ref 5–15)
AST SERPL-CCNC: 19 U/L (ref 1–32)
BACTERIA SPEC AEROBE CULT: NO GROWTH
BASOPHILS # BLD AUTO: 0.02 10*3/MM3 (ref 0–0.2)
BASOPHILS NFR BLD AUTO: 0.4 % (ref 0–1.5)
BILIRUB SERPL-MCNC: 0.4 MG/DL (ref 0–1.2)
BUN SERPL-MCNC: 4.1 MG/DL (ref 6–20)
BUN/CREAT SERPL: 9.1 (ref 7–25)
CALCIUM SPEC-SCNC: 8 MG/DL (ref 8.6–10.5)
CHLORIDE SERPL-SCNC: 105 MMOL/L (ref 98–107)
CO2 SERPL-SCNC: 21 MMOL/L (ref 22–29)
CREAT SERPL-MCNC: 0.45 MG/DL (ref 0.57–1)
DEPRECATED RDW RBC AUTO: 41.8 FL (ref 37–54)
EGFRCR SERPLBLD CKD-EPI 2021: 115.9 ML/MIN/1.73
EOSINOPHIL # BLD AUTO: 0.2 10*3/MM3 (ref 0–0.4)
EOSINOPHIL NFR BLD AUTO: 3.9 % (ref 0.3–6.2)
ERYTHROCYTE [DISTWIDTH] IN BLOOD BY AUTOMATED COUNT: 12.7 % (ref 12.3–15.4)
GLOBULIN UR ELPH-MCNC: 2.3 GM/DL
GLUCOSE SERPL-MCNC: 85 MG/DL (ref 65–99)
HCT VFR BLD AUTO: 35.8 % (ref 34–46.6)
HGB BLD-MCNC: 12.1 G/DL (ref 12–15.9)
IMM GRANULOCYTES # BLD AUTO: 0.01 10*3/MM3 (ref 0–0.05)
IMM GRANULOCYTES NFR BLD AUTO: 0.2 % (ref 0–0.5)
LYMPHOCYTES # BLD AUTO: 0.9 10*3/MM3 (ref 0.7–3.1)
LYMPHOCYTES NFR BLD AUTO: 17.5 % (ref 19.6–45.3)
MCH RBC QN AUTO: 30.9 PG (ref 26.6–33)
MCHC RBC AUTO-ENTMCNC: 33.8 G/DL (ref 31.5–35.7)
MCV RBC AUTO: 91.6 FL (ref 79–97)
MONOCYTES # BLD AUTO: 0.32 10*3/MM3 (ref 0.1–0.9)
MONOCYTES NFR BLD AUTO: 6.2 % (ref 5–12)
NEUTROPHILS NFR BLD AUTO: 3.68 10*3/MM3 (ref 1.7–7)
NEUTROPHILS NFR BLD AUTO: 71.8 % (ref 42.7–76)
NRBC BLD AUTO-RTO: 0 /100 WBC (ref 0–0.2)
PLATELET # BLD AUTO: 252 10*3/MM3 (ref 140–450)
PMV BLD AUTO: 9.4 FL (ref 6–12)
POTASSIUM SERPL-SCNC: 3.6 MMOL/L (ref 3.5–5.2)
PROT SERPL-MCNC: 6 G/DL (ref 6–8.5)
RBC # BLD AUTO: 3.91 10*6/MM3 (ref 3.77–5.28)
SODIUM SERPL-SCNC: 139 MMOL/L (ref 136–145)
WBC NRBC COR # BLD AUTO: 5.13 10*3/MM3 (ref 3.4–10.8)

## 2025-06-22 PROCEDURE — 85025 COMPLETE CBC W/AUTO DIFF WBC: CPT | Performed by: FAMILY MEDICINE

## 2025-06-22 PROCEDURE — 80053 COMPREHEN METABOLIC PANEL: CPT | Performed by: FAMILY MEDICINE

## 2025-06-22 PROCEDURE — 25010000002 ENOXAPARIN PER 10 MG: Performed by: FAMILY MEDICINE

## 2025-06-22 PROCEDURE — 25010000002 METRONIDAZOLE 500 MG/100ML SOLUTION: Performed by: FAMILY MEDICINE

## 2025-06-22 PROCEDURE — 25010000002 FAMOTIDINE 10 MG/ML SOLUTION: Performed by: FAMILY MEDICINE

## 2025-06-22 PROCEDURE — 25010000002 CEFTRIAXONE PER 250 MG: Performed by: FAMILY MEDICINE

## 2025-06-22 RX ADMIN — FAMOTIDINE 20 MG: 10 INJECTION INTRAVENOUS at 08:06

## 2025-06-22 RX ADMIN — LEVOTHYROXINE SODIUM 75 MCG: 0.07 TABLET ORAL at 05:34

## 2025-06-22 RX ADMIN — CEFTRIAXONE SODIUM 1000 MG: 1 INJECTION, POWDER, FOR SOLUTION INTRAMUSCULAR; INTRAVENOUS at 23:36

## 2025-06-22 RX ADMIN — ENOXAPARIN SODIUM 40 MG: 100 INJECTION SUBCUTANEOUS at 08:06

## 2025-06-22 RX ADMIN — METRONIDAZOLE 500 MG: 500 INJECTION, SOLUTION INTRAVENOUS at 17:43

## 2025-06-22 RX ADMIN — METRONIDAZOLE 500 MG: 500 INJECTION, SOLUTION INTRAVENOUS at 08:06

## 2025-06-22 RX ADMIN — DESVENLAFAXINE SUCCINATE 50 MG: 50 TABLET, EXTENDED RELEASE ORAL at 08:06

## 2025-06-22 RX ADMIN — FAMOTIDINE 20 MG: 10 INJECTION INTRAVENOUS at 22:14

## 2025-06-22 RX ADMIN — METRONIDAZOLE 500 MG: 500 INJECTION, SOLUTION INTRAVENOUS at 00:03

## 2025-06-22 NOTE — PLAN OF CARE
Goal Outcome Evaluation: No complaints of pain this shift. Patient states that she is feeling better today. Patient advanced to regular diet this shift.  Tolerating without difficulty. Patient safety to  be maintained this shift, continue to monitor and report abnormal to provider.

## 2025-06-22 NOTE — PLAN OF CARE
Goal Outcome Evaluation:  Plan of Care Reviewed With: patient        Progress: improving  Outcome Evaluation: Patient received alert and orientated with no  acute distress.  Patient took all prescribed medications with no s/s of adverse reactions.  Patient is ambulating in room and up to toilet; patient safety maintained with call bell in reach.  Currently, patient has no new complaints and is resting in bed comfortably.  Will continue to assess and treat per plan of care.  On continuous cardiac monitor patient remains in NSR with HR 60-74.

## 2025-06-22 NOTE — PROGRESS NOTES
"Progress Note  Bre Birch  6/22/2025 09:18 CDT  Subjective:   Admit Date:   6/20/2025      CC/ADMIT DX:       Interval History:   Reviewed overnight events and nursing notes.  She has had no c/o chest pain or SOA. Her pain is improved. No N/V.     I have reviewed all labs/diagnostics from the last 24hrs.       ROS:   I have done a 10 point ROS and all are negative, except what is mentioned in the HPI.    No diet orders on file    Medications:      cefTRIAXone, 1,000 mg, Intravenous, Q24H  desvenlafaxine, 50 mg, Oral, Daily  enoxaparin sodium, 40 mg, Subcutaneous, Q24H  famotidine, 20 mg, Intravenous, Q12H  levothyroxine, 75 mcg, Oral, Daily  metroNIDAZOLE, 500 mg, Intravenous, Q8H            Objective:   Vitals: /71 (BP Location: Left arm, Patient Position: Lying)   Pulse 78   Temp 97.8 °F (36.6 °C) (Oral)   Resp 16   Ht 165.1 cm (65\")   Wt 84.8 kg (187 lb)   SpO2 97%   BMI 31.12 kg/m²    Intake/Output Summary (Last 24 hours) at 6/22/2025 0918  Last data filed at 6/22/2025 0900  Gross per 24 hour   Intake 630 ml   Output 2000 ml   Net -1370 ml     General appearance: alert and cooperative with exam  Lungs: clear to auscultation bilaterally  Heart: regular rate and rhythm, S1, S2 normal, no murmur, click, rub or gallop  Abdomen: soft, non-tender; bowel sounds normal; no masses,  no organomegaly  Extremities: extremities normal, atraumatic, no cyanosis or edema  Neurologic:  No obvious focal neurologic deficits.    Assessment and Plan:     Acute diverticulitis    Ovarian Cyst    Plan:   Continue present medication(s)    Advance diet and be sure she tolerates   Plan for d/c tomorrow if she does well with diet   D/W pt need for f/u with GYN for repeat testing of ovarian cyst seen on CT      Discharge planning:   her home     Reviewed treatment plans with the patient and/or family.             Electronically signed by Natalie Fung MD on 6/22/2025 at 09:18 CDT  "

## 2025-06-23 ENCOUNTER — READMISSION MANAGEMENT (OUTPATIENT)
Dept: CALL CENTER | Facility: HOSPITAL | Age: 55
End: 2025-06-23
Payer: COMMERCIAL

## 2025-06-23 VITALS
OXYGEN SATURATION: 94 % | HEART RATE: 62 BPM | HEIGHT: 65 IN | WEIGHT: 187 LBS | BODY MASS INDEX: 31.16 KG/M2 | SYSTOLIC BLOOD PRESSURE: 114 MMHG | TEMPERATURE: 97.9 F | RESPIRATION RATE: 16 BRPM | DIASTOLIC BLOOD PRESSURE: 59 MMHG

## 2025-06-23 LAB
ANION GAP SERPL CALCULATED.3IONS-SCNC: 12 MMOL/L (ref 5–15)
BASOPHILS # BLD AUTO: 0.03 10*3/MM3 (ref 0–0.2)
BASOPHILS NFR BLD AUTO: 0.8 % (ref 0–1.5)
BUN SERPL-MCNC: 5.3 MG/DL (ref 6–20)
BUN/CREAT SERPL: 10.2 (ref 7–25)
CALCIUM SPEC-SCNC: 8.2 MG/DL (ref 8.6–10.5)
CHLORIDE SERPL-SCNC: 105 MMOL/L (ref 98–107)
CO2 SERPL-SCNC: 22 MMOL/L (ref 22–29)
CREAT SERPL-MCNC: 0.52 MG/DL (ref 0.57–1)
DEPRECATED RDW RBC AUTO: 40.9 FL (ref 37–54)
EGFRCR SERPLBLD CKD-EPI 2021: 111.9 ML/MIN/1.73
EOSINOPHIL # BLD AUTO: 0.17 10*3/MM3 (ref 0–0.4)
EOSINOPHIL NFR BLD AUTO: 4.3 % (ref 0.3–6.2)
ERYTHROCYTE [DISTWIDTH] IN BLOOD BY AUTOMATED COUNT: 12.5 % (ref 12.3–15.4)
GLUCOSE SERPL-MCNC: 80 MG/DL (ref 65–99)
HCT VFR BLD AUTO: 34.7 % (ref 34–46.6)
HGB BLD-MCNC: 11.9 G/DL (ref 12–15.9)
IMM GRANULOCYTES # BLD AUTO: 0.01 10*3/MM3 (ref 0–0.05)
IMM GRANULOCYTES NFR BLD AUTO: 0.3 % (ref 0–0.5)
LYMPHOCYTES # BLD AUTO: 1.4 10*3/MM3 (ref 0.7–3.1)
LYMPHOCYTES NFR BLD AUTO: 35.5 % (ref 19.6–45.3)
MCH RBC QN AUTO: 31 PG (ref 26.6–33)
MCHC RBC AUTO-ENTMCNC: 34.3 G/DL (ref 31.5–35.7)
MCV RBC AUTO: 90.4 FL (ref 79–97)
MONOCYTES # BLD AUTO: 0.41 10*3/MM3 (ref 0.1–0.9)
MONOCYTES NFR BLD AUTO: 10.4 % (ref 5–12)
NEUTROPHILS NFR BLD AUTO: 1.92 10*3/MM3 (ref 1.7–7)
NEUTROPHILS NFR BLD AUTO: 48.7 % (ref 42.7–76)
NRBC BLD AUTO-RTO: 0 /100 WBC (ref 0–0.2)
PLATELET # BLD AUTO: 264 10*3/MM3 (ref 140–450)
PMV BLD AUTO: 9.6 FL (ref 6–12)
POTASSIUM SERPL-SCNC: 3.3 MMOL/L (ref 3.5–5.2)
RBC # BLD AUTO: 3.84 10*6/MM3 (ref 3.77–5.28)
SODIUM SERPL-SCNC: 139 MMOL/L (ref 136–145)
WBC NRBC COR # BLD AUTO: 3.94 10*3/MM3 (ref 3.4–10.8)

## 2025-06-23 PROCEDURE — 25010000002 ENOXAPARIN PER 10 MG: Performed by: FAMILY MEDICINE

## 2025-06-23 PROCEDURE — 80048 BASIC METABOLIC PNL TOTAL CA: CPT | Performed by: FAMILY MEDICINE

## 2025-06-23 PROCEDURE — 25010000002 METRONIDAZOLE 500 MG/100ML SOLUTION: Performed by: FAMILY MEDICINE

## 2025-06-23 PROCEDURE — 25010000002 ONDANSETRON PER 1 MG: Performed by: FAMILY MEDICINE

## 2025-06-23 PROCEDURE — 25010000002 FAMOTIDINE 10 MG/ML SOLUTION: Performed by: FAMILY MEDICINE

## 2025-06-23 PROCEDURE — 85025 COMPLETE CBC W/AUTO DIFF WBC: CPT | Performed by: FAMILY MEDICINE

## 2025-06-23 RX ORDER — CIPROFLOXACIN 500 MG/1
500 TABLET, FILM COATED ORAL 2 TIMES DAILY
Qty: 20 TABLET | Refills: 0 | Status: SHIPPED | OUTPATIENT
Start: 2025-06-23

## 2025-06-23 RX ORDER — COLESTIPOL HYDROCHLORIDE 1 G/1
1 TABLET ORAL NIGHTLY
Start: 2025-06-23

## 2025-06-23 RX ORDER — METRONIDAZOLE 500 MG/1
500 TABLET ORAL 3 TIMES DAILY
Qty: 30 TABLET | Refills: 0 | Status: SHIPPED | OUTPATIENT
Start: 2025-06-23

## 2025-06-23 RX ADMIN — METRONIDAZOLE 500 MG: 500 INJECTION, SOLUTION INTRAVENOUS at 00:40

## 2025-06-23 RX ADMIN — ONDANSETRON 4 MG: 2 INJECTION INTRAMUSCULAR; INTRAVENOUS at 00:44

## 2025-06-23 RX ADMIN — DESVENLAFAXINE SUCCINATE 50 MG: 50 TABLET, EXTENDED RELEASE ORAL at 08:04

## 2025-06-23 RX ADMIN — ENOXAPARIN SODIUM 40 MG: 100 INJECTION SUBCUTANEOUS at 08:04

## 2025-06-23 RX ADMIN — FAMOTIDINE 20 MG: 10 INJECTION INTRAVENOUS at 08:04

## 2025-06-23 RX ADMIN — METRONIDAZOLE 500 MG: 500 INJECTION, SOLUTION INTRAVENOUS at 08:04

## 2025-06-23 RX ADMIN — LEVOTHYROXINE SODIUM 75 MCG: 0.07 TABLET ORAL at 06:42

## 2025-06-23 NOTE — PLAN OF CARE
Goal Outcome Evaluation:  Plan of Care Reviewed With: patient        Progress: improving  Outcome Evaluation: pt being discharged home

## 2025-06-23 NOTE — DISCHARGE SUMMARY
Hospital Discharge Summary    Bre Birch  :  1970  MRN:  [id number]    Admit date:  2025  Discharge date:  2025    Admitting Physician:    Rik Berry MD    Discharge Diagnoses:      Acute diverticulitis      Hospital Course:       54-year-old female who presented with abdominal pain secondary diverticulitis.  She was treated with antibiotics and clear liquid diet with resolution of symptoms.  She be discharged on ciprofloxacin and Flagyl for 10 days duration to complete 14 total days of treatment.  Upon her hospital follow-up, she will need to have a discussion regarding colonoscopy and follow-up pelvic ultrasound in 12 weeks to further characterize a cyst.    Discharge Medications:         Discharge Medications        New Medications        Instructions Start Date   ciprofloxacin 500 MG tablet  Commonly known as: Cipro   500 mg, Oral, 2 Times Daily      metroNIDAZOLE 500 MG tablet  Commonly known as: FLAGYL   500 mg, Oral, 3 Times Daily             Continue These Medications        Instructions Start Date   cetirizine 10 MG tablet  Commonly known as: zyrTEC   10 mg, Daily      colestipol 1 g tablet  Commonly known as: COLESTID   1 g, Oral, Nightly      desvenlafaxine 100 MG 24 hr tablet  Commonly known as: PRISTIQ   100 mg, Oral, Daily      Estrogens Conjugated 0.625 MG/GM vaginal cream  Commonly known as: PREMARIN   1 g, 3 Times Weekly      fish oil 1000 MG capsule capsule   1,000 mg, Daily With Breakfast      levothyroxine 75 MCG tablet  Commonly known as: SYNTHROID, LEVOTHROID   75 mcg, Daily      losartan-hydrochlorothiazide 50-12.5 MG per tablet  Commonly known as: HYZAAR   1 tablet, Daily      multivitamin with minerals tablet tablet   1 tablet, Daily      omeprazole 40 MG capsule  Commonly known as: priLOSEC   40 mg, Daily      POTASSIUM GLUCONATE PO   1 tablet, Daily      pravastatin 40 MG tablet  Commonly known as: PRAVACHOL   40 mg, Nightly             Stop These  Medications      nitrofurantoin (macrocrystal-monohydrate) 100 MG capsule  Commonly known as: Macrobid            Significant Diagnostic Studies:      CT Abdomen Pelvis With Contrast  Result Date: 6/19/2025   1.  Acute sigmoid diverticulitis. No free air or abscess.  2.  3 mm left renal calculus. No ureteral stone or hydronephrosis.  3.  3.3 cm left ovarian cyst. Recommend a follow-up pelvic ultrasound in 6 to 12 weeks based on ACR white paper criteria.    This report was signed and finalized on 6/19/2025 2:00 PM by Dr. Kelechi Galicia MD.           Disposition:   Home  Follow up with Rik Berry MD in 1 weeks.    Signed:  Figueroa Alvarado MD   6/23/2025, 08:09 CDT

## 2025-06-23 NOTE — PLAN OF CARE
Goal Outcome Evaluation:  Plan of Care Reviewed With: patient        Progress: improving  Outcome Evaluation: VSS. No c/o pain. IV abx as ordered.

## 2025-06-23 NOTE — PAYOR COMM NOTE
"Bre Birch (54 y.o. Female)  [id number]      Admit 6/20    [facility] phone    fax          Date of Birth   1970    Social Security Number       Address   [address] [place] [id number]    Home Phone   [phone]    MRN   [id number]       Yazdanism   Christian    Marital Status                               Admission Date   6/20/2025    Admission Type   Emergency    Admitting Provider   Rik Berry MD    Attending Provider   Rik Berry MD    Department, Room/Bed   [facility] 3C, 362/1       Discharge Date       Discharge Disposition   Home or Self Care    Discharge Destination                                 Attending Provider: Rik Berry MD    Allergies: Bactrim [Sulfamethoxazole-trimethoprim], Azithromycin, Macrolides And Ketolides    Isolation: None   Infection: None   Code Status: Prior    Ht: 165.1 cm (65\")   Wt: 84.8 kg (187 lb)    Admission Cmt: None   Principal Problem: Acute diverticulitis [K57.92]                   Active Insurance as of 6/20/2025       Primary Coverage       Payor Plan Insurance Group Employer/Plan Group    [employer] BLUE [employer]OSS [employer] BLUE CROSS BLUE [employer]IELD PPO [id number]       Payor Plan Address Payor Plan Phone Number Payor Plan Fax Number Effective Dates    [address] [phone]  1/1/2017 - None Entered    [facility] [id number]         Subscriber Name Subscriber Birth Date Member ID       MARKMONICA GAFFNEY 10/30/1966 [id number]                     Emergency Contacts        (Rel.) Home Phone Work Phone Mobile Phone    Monica Birch (Spouse) [phone] -- --    Nohelia Birch (Daughter) -- -- [phone]    Do Birch (Daughter) -- -- [phone]                 History & Physical        Natalie Fung MD at 06/21/25 0834           History and Physical      CHIEF COMPLAINT:  Abdominal Pain    Reason for Admission:  Abdominal Pain, " Diverticulitis    History Obtained From:  patient, chart    HISTORY OF PRESENT ILLNESS:      The patient is a 54 y.o. female who was admitted from ER with acute diverticulitis. She has had lower abdominal pain and thought she had a UTI. She has had some nausea. No vomiting. No changes in B. No fevers. No CP or SOA.     Past Medical History:    Past Medical History:   Diagnosis Date    Anxiety     GERD (gastroesophageal reflux disease)     Hiatal hernia     Hyperlipidemia     Hypothyroid     Obstructive sleep apnea     UTI (urinary tract infection)      Past Surgical History:    Past Surgical History:   Procedure Laterality Date    BREAST BIOPSY Left 2 yrs ago    CHOLECYSTECTOMY      COLONOSCOPY      ENDOMETRIAL ABLATION      ENDOSCOPY      FOOT ARTHRODESIS, MODIFIED DOUGLASS      HERNIA REPAIR      HIATAL HERNIA REPAIR N/A 5/2/2024    Procedure: LAPAROSCOPIC HIATAL HERNIA REPAIR WITH DAVINCI ROBOT;  Surgeon: Uli Marte MD;  Location: [facility];  Service: Robotics - [employer];  Laterality: N/A;    TUBAL ABDOMINAL LIGATION           Medications Prior to Admission:    Medications Prior to Admission   Medication Sig Dispense Refill Last Dose/Taking    desvenlafaxine (PRISTIQ) 50 MG 24 hr tablet Take 1 tablet by mouth Daily.       Estrogens Conjugated (Premarin) 0.625 MG/GM vaginal cream Insert  into the vagina Daily. 30 g 0     fexofenadine (Allegra Allergy) 180 MG tablet Take 1 tablet by mouth Daily. (Patient not taking: Reported on 6/17/2025) 30 tablet 0     levothyroxine (SYNTHROID, LEVOTHROID) 75 MCG tablet Take 1 tablet by mouth Daily.       losartan-hydrochlorothiazide (HYZAAR) 50-12.5 MG per tablet Take 1 tablet by mouth Daily.       multivitamin with minerals tablet tablet Take 1 tablet by mouth Daily.       nitrofurantoin, macrocrystal-monohydrate, (Macrobid) 100 MG capsule Take 1 capsule by mouth 2 (Two) Times a Day for 10 days. 20 capsule 0     Omega-3 Fatty Acids (FISH OIL) 1000 MG capsule capsule Take 2  "capsules by mouth Daily With Breakfast.       omeprazole (priLOSEC) 40 MG capsule Take 1 capsule by mouth Daily.       POTASSIUM GLUCONATE PO Take  by mouth.       pravastatin (PRAVACHOL) 40 MG tablet Take 1 tablet by mouth Every Night.          Allergies:  Bactrim [sulfamethoxazole-trimethoprim], Azithromycin, and Macrolides and ketolides    Social History:   TOBACCO:   reports that she has never smoked. She has never used smokeless tobacco.  ETOH:   reports no history of alcohol use.  DRUGS:   reports no history of drug use.      Family History:   Family History   Problem Relation Age of Onset    Hypertension Father     Heart disease Father     Alcohol abuse Father     Stroke Mother     COPD Mother     Hypertension Brother     No Known Problems Daughter     No Known Problems Paternal Grandmother     No Known Problems Maternal Grandmother     Cancer Maternal Grandfather     No Known Problems Maternal Aunt     No Known Problems Paternal Aunt     BRCA 1/2 Neg Hx     Breast cancer Neg Hx     Colon cancer Neg Hx     Endometrial cancer Neg Hx     Ovarian cancer Neg Hx      REVIEW OF SYSTEMS:  Constitutional: Negative   CV: Negative  Pulmonary: Negative  GI: abdominal pain, nausea  : Negative  Psych: Negative  Neuro: Negative  Skin: Negative  MusculoSkeletal: Negative  HEENT: Negative  Joints: Negative  Vitals:  /60 (BP Location: Left arm, Patient Position: Lying)   Pulse 96   Temp 98.6 °F (37 °C) (Oral)   Resp 16   Ht 165.1 cm (65\")   Wt 84.8 kg (187 lb)   SpO2 93%   BMI 31.12 kg/m²     PHYSICAL EXAM:  Gen: NAD, alert, pleasant  HEENT: WNL  Lymph: no LAD  Neck: no JVD or masses  Chest: CTA bilaterally  CV: RRR  Abdomen: NT/ND  Extrem: no C/C/E  Neuro: Nonfocal  Skin: no rashes  Joints: no redness  DATA:  I have reviewed the admission labs and imaging tests.    ASSESSMENT AND PLAN:      Acute Diverticulitis--continue antibiotics, ok for clear liquids and follow  Hypothyroidism--resume PO " meds  Depression      Natalie Fung MD  08:27 CDT 6/21/2025    Electronically signed by Natalie Fung MD at 06/21/25 0830          Emergency Department Notes        Kj Calderon Jr., MD at 06/21/25 0106          HPI:       Patient is a 54-year-old white female presents to the emergency room with left flank pain and left upper quadrant abdominal pain left lower quadrant abdominal pain.  Patient has a history of urinary tract infection has had numerous antibiotics.  However patient's pain got worse today she had a CT scan that showed acute diverticulitis no perforation.  She is here for admission and treatment for her diverticulitis.  Patient's pain is 6 out of 10.  Patient just started Macrobid for UTI today.    REVIEW OF SYSTEMS  CONSTITUTIONAL:  No complaints of fever, chills,or weakness  EYES:  No complaints of discharge   ENT: No complaints of sore throat or ear pain  CARDIOVASCULAR:  No complaints of chest pain, palpitations, or swelling  RESPIRATORY:  No complaints of cough or shortness of breath  GI: Positive for left lower quadrant pain.  MUSCULOSKELETAL:  No complaints of back pain  SKIN:  No complaints of rash  NEUROLOGIC:  No complaints of headache, focal weakness, or sensory changes  ENDOCRINE:  No complaints of polyuria or polydipsia  LYMPHATIC:  No complaints of swollen glands  GENITOURINARY: Positive for left flank pain and urinary frequency and urgency      PAST MEDICAL HISTORY  Past Medical History:   Diagnosis Date    Anxiety     GERD (gastroesophageal reflux disease)     Hiatal hernia     Hyperlipidemia     Hypothyroid     Obstructive sleep apnea     UTI (urinary tract infection)        FAMILY HISTORY  Family History   Problem Relation Age of Onset    Hypertension Father     Heart disease Father     Alcohol abuse Father     Stroke Mother     COPD Mother     Hypertension Brother     No Known Problems Daughter     No Known Problems Paternal Grandmother     No Known Problems  Maternal Grandmother     Cancer Maternal Grandfather     No Known Problems Maternal Aunt     No Known Problems Paternal Aunt     BRCA 1/2 Neg Hx     Breast cancer Neg Hx     Colon cancer Neg Hx     Endometrial cancer Neg Hx     Ovarian cancer Neg Hx        SOCIAL HISTORY  Social History     Socioeconomic History    Marital status:    Tobacco Use    Smoking status: Never    Smokeless tobacco: Never   Vaping Use    Vaping status: Never Used   Substance and Sexual Activity    Alcohol use: No    Drug use: No    Sexual activity: Yes     Partners: Male     Birth control/protection: Surgical       IMMUNIZATION HISTORY  Deferred to primary care physician.    SURGICAL HISTORY  Past Surgical History:   Procedure Laterality Date    BREAST BIOPSY Left 2 yrs ago    CHOLECYSTECTOMY      COLONOSCOPY      ENDOMETRIAL ABLATION      ENDOSCOPY      FOOT ARTHRODESIS, MODIFIED DOUGLASS      HERNIA REPAIR      HIATAL HERNIA REPAIR N/A 5/2/2024    Procedure: LAPAROSCOPIC HIATAL HERNIA REPAIR WITH DAVINCI ROBOT;  Surgeon: Uli Marte MD;  Location: [facility];  Service: Robotics - [employer];  Laterality: N/A;    TUBAL ABDOMINAL LIGATION         CURRENT MEDICATIONS    Current Facility-Administered Medications:     cefTRIAXone (ROCEPHIN) 1,000 mg in sodium chloride 0.9 % 100 mL MBP, 1,000 mg, Intravenous, Q24H, Natalie Fung MD    desvenlafaxine (PRISTIQ) 24 hr tablet 50 mg, 50 mg, Oral, Daily, Natalie Fung MD, 50 mg at 06/21/25 0918    enoxaparin sodium (LOVENOX) syringe 40 mg, 40 mg, Subcutaneous, Q24H, Natalie Fung MD, 40 mg at 06/21/25 0918    famotidine (PEPCID) injection 20 mg, 20 mg, Intravenous, Q12H, Natalie Fung MD, 20 mg at 06/21/25 0918    HYDROcodone-acetaminophen (NORCO) 5-325 MG per tablet 1 tablet, 1 tablet, Oral, Q6H PRN, Natalie Fung MD, 1 tablet at 06/21/25 0922    levothyroxine (SYNTHROID, LEVOTHROID) tablet 75 mcg, 75 mcg, Oral, Daily, Natalie Fung MD, 75 mcg  "at 06/21/25 0918    metroNIDAZOLE (FLAGYL) IVPB 500 mg, 500 mg, Intravenous, Q8H, Natalie Fung MD, Last Rate: 200 mL/hr at 06/21/25 1835, 500 mg at 06/21/25 1835    ondansetron (ZOFRAN) injection 4 mg, 4 mg, Intravenous, Q6H PRN, Natalie Fung MD    ondansetron ODT (ZOFRAN-ODT) disintegrating tablet 4 mg, 4 mg, Translingual, Q6H PRN, Natalie Fung MD    sodium chloride 0.9 % flush 10 mL, 10 mL, Intravenous, PRN, Emergency, Triage Protocol, MD    sodium chloride 0.9 % infusion, 75 mL/hr, Intravenous, Continuous, Natalie Fung MD, Last Rate: 75 mL/hr at 06/21/25 0902, 75 mL/hr at 06/21/25 0902    ALLERGIES  Allergies   Allergen Reactions    Bactrim [Sulfamethoxazole-Trimethoprim] Anaphylaxis    Azithromycin Hives    Macrolides And Ketolides Dizziness     Short of breath       ABDOMINAL EXAM    VITAL SIGNS:   /65 (BP Location: Right arm, Patient Position: Lying)   Pulse 73   Temp 97.9 °F (36.6 °C) (Oral)   Resp 16   Ht 165.1 cm (65\")   Wt 84.8 kg (187 lb)   SpO2 95%   BMI 31.12 kg/m²     Constitutional: Patient is alert and in no distress.  Patient with moderate abdominal and left flank discomfort.    ENT: There is a normal pharynx with no acute erythema or exudate and oral mucosa is moist.  Nose is clear with no drainage.  Tympanic membranes intact and non-erythemic    Cardiovascular: S1-S2 regular rate and rhythm. No murmurs, rubs or gallops.  Pulses are equal bilaterally and there is no pitting edema.    Respiratory: Patient is clear to auscultation bilaterally with no wheezing or rhonchi.  Chest wall is nontender.  There are no external lesions on the chest.  There is no crepitance.    Gastrointestinal: Tender palpation left lower quadrant of the abdomen but no abdominal distention no fluid wave.  Bowel sounds normal in all 4 quadrants.    Genitourinary: Mild left CVA tenderness but no CVA tenderness on percussion on the right.      Integument: No acute lesions noted.  " Color appears to be normal.    Archer Coma Scale: Total score 15    Neurological: Patient is alert and oriented x4 and no acute findings noted.  Speech is fluent and cognition is normal.  No evidence of acute CVA.  Cranial nerves II through XII intact.  Patient with normal motor function as well as reflexes and sensation    Psychiatric: Normal affect and mood.            RADIOLOGY/PROCEDURES        No orders to display          FUTURE APPOINTMENTS     Future Appointments   Date Time Provider Department Center   7/7/2025  3:00 PM Esthela Cole MD [facility]   7/17/2025  3:15 PM Elayne Tillman APRN [facility]   8/4/2025  3:00 PM Elayne Tillman APRN [facility]   5/11/2026  8:00 AM Rosanne Cardoza APRN [facility]  [facility]            COURSE & MEDICAL DECISION MAKING       Patient's partial differential diagnosis can include:    diverticulitis, gastritis, gastroenteritis, colitis, enteritis, cholecystitis, pancreatitis, GERD,  Acute appendicitis,partial bowel obstruction, bowel obstruction,volvulus, intussusception, constipation, irritable bowel,  diverticulosis, AAA, peptic ulcer disease, perforated esophagus,  mesenteric adenitis, mesenteric ischemia, Crohn's disease, ulcerative colitis, perforated peptic ulcer,celiac disease, esophageal spasms, gastroparesis, and others      CBC, CMP, lactate, lipase, urinalysis.    Will give Rocephin and Flagyl IV to help treat her as acute diverticulitis.  The Rocephin should also help with the urinalysis that is also showing signs of urinary tract infection.  Will call with Dr. Berry who is a patient PCP for admission.  Patient was  Accepted by Dr. Dr. Berry for admission    Patient's level of risk: Moderate        CRITICAL CARE    CRITICAL CARE: no    CRITICAL CARE TIME: None      Recent Results (from the past 24 hours)   Comprehensive Metabolic Panel    Collection Time: 06/20/25  9:46 PM    Specimen: Arm, Left; Blood   Result Value Ref Range     Glucose 110 (H) 65 - 99 mg/dL    BUN 12.2 6.0 - 20.0 mg/dL    Creatinine 0.64 0.57 - 1.00 mg/dL    Sodium 141 136 - 145 mmol/L    Potassium 3.5 3.5 - 5.2 mmol/L    Chloride 103 98 - 107 mmol/L    CO2 22.0 22.0 - 29.0 mmol/L    Calcium 9.2 8.6 - 10.5 mg/dL    Total Protein 7.6 6.0 - 8.5 g/dL    Albumin 4.5 3.5 - 5.2 g/dL    ALT (SGPT) 25 1 - 33 U/L    AST (SGOT) 24 1 - 32 U/L    Alkaline Phosphatase 61 39 - 117 U/L    Total Bilirubin 0.6 0.0 - 1.2 mg/dL    Globulin 3.1 gm/dL    A/G Ratio 1.5 g/dL    BUN/Creatinine Ratio 19.1 7.0 - 25.0    Anion Gap 16.0 (H) 5.0 - 15.0 mmol/L    eGFR 106.5 >60.0 mL/min/1.73   Lipase    Collection Time: 06/20/25  9:46 PM    Specimen: Arm, Left; Blood   Result Value Ref Range    Lipase 23 13 - 60 U/L   Lactic Acid, Plasma    Collection Time: 06/20/25  9:46 PM    Specimen: Arm, Left; Blood   Result Value Ref Range    Lactate 0.9 0.5 - 2.0 mmol/L   Green Top (Gel)    Collection Time: 06/20/25  9:46 PM   Result Value Ref Range    Extra Tube Hold for add-ons.    Lavender Top    Collection Time: 06/20/25  9:46 PM   Result Value Ref Range    Extra Tube hold for add-on    Red Top    Collection Time: 06/20/25  9:46 PM   Result Value Ref Range    Extra Tube Hold for add-ons.    Light Blue Top    Collection Time: 06/20/25  9:46 PM   Result Value Ref Range    Extra Tube Hold for add-ons.    CBC Auto Differential    Collection Time: 06/20/25  9:46 PM    Specimen: Arm, Left; Blood   Result Value Ref Range    WBC 13.75 (H) 3.40 - 10.80 10*3/mm3    RBC 4.50 3.77 - 5.28 10*6/mm3    Hemoglobin 14.1 12.0 - 15.9 g/dL    Hematocrit 41.5 34.0 - 46.6 %    MCV 92.2 79.0 - 97.0 fL    MCH 31.3 26.6 - 33.0 pg    MCHC 34.0 31.5 - 35.7 g/dL    RDW 12.9 12.3 - 15.4 %    RDW-SD 43.1 37.0 - 54.0 fl    MPV 9.7 6.0 - 12.0 fL    Platelets 304 140 - 450 10*3/mm3    Neutrophil % 88.3 (H) 42.7 - 76.0 %    Lymphocyte % 7.5 (L) 19.6 - 45.3 %    Monocyte % 2.9 (L) 5.0 - 12.0 %    Eosinophil % 0.6 0.3 - 6.2 %    Basophil %  0.3 0.0 - 1.5 %    Immature Grans % 0.4 0.0 - 0.5 %    Neutrophils, Absolute 12.15 (H) 1.70 - 7.00 10*3/mm3    Lymphocytes, Absolute 1.03 0.70 - 3.10 10*3/mm3    Monocytes, Absolute 0.40 0.10 - 0.90 10*3/mm3    Eosinophils, Absolute 0.08 0.00 - 0.40 10*3/mm3    Basophils, Absolute 0.04 0.00 - 0.20 10*3/mm3    Immature Grans, Absolute 0.05 0.00 - 0.05 10*3/mm3    nRBC 0.0 0.0 - 0.2 /100 WBC   Urinalysis With Microscopic If Indicated (No Culture) - Urine, Clean Catch    Collection Time: 06/20/25 11:35 PM    Specimen: Urine, Clean Catch   Result Value Ref Range    Color, UA Yellow Yellow, Straw    Appearance, UA Clear Clear    pH, UA <=5.0 5.0 - 8.0    Specific Gravity, UA 1.014 1.005 - 1.030    Glucose, UA Negative Negative    Ketones, UA 15 mg/dL (1+) (A) Negative    Bilirubin, UA Negative Negative    Blood, UA Negative Negative    Protein, UA Negative Negative    Leuk Esterase, UA Moderate (2+) (A) Negative    Nitrite, UA Negative Negative    Urobilinogen, UA 1.0 E.U./dL 0.2 - 1.0 E.U./dL   Urinalysis, Microscopic Only - Urine, Clean Catch    Collection Time: 06/20/25 11:35 PM    Specimen: Urine, Clean Catch   Result Value Ref Range    RBC, UA 0-2 None Seen, 0-2 /HPF    WBC, UA 21-50 (A) None Seen, 0-2 /HPF    Bacteria, UA Trace (A) None Seen /HPF    Squamous Epithelial Cells, UA 3-6 (A) None Seen, 0-2 /HPF    Hyaline Casts, UA None Seen None Seen /LPF    Methodology Automated Microscopy           Old charts were reviewed per [facility] [employer].  Pertinent details are summarized above.  All laboratory, radiologic, and EKG studies that were performed in the Emergency Department were a necessary part of the evaluation needed to exclude unstable or  emergent medical conditions.     Patient was hemodynamically and neurologically stable in the ED.   Pertinent studies were reviewed as above.     The patient received:  Medications   sodium chloride 0.9 % flush 10 mL (has no administration in time range)   sodium chloride 0.9 %  infusion (0 mL/hr Intravenous Stopped 6/21/25 0902)   ondansetron (ZOFRAN) injection 4 mg (has no administration in time range)   ondansetron ODT (ZOFRAN-ODT) disintegrating tablet 4 mg (has no administration in time range)   cefTRIAXone (ROCEPHIN) 1,000 mg in sodium chloride 0.9 % 100 mL MBP (has no administration in time range)   metroNIDAZOLE (FLAGYL) IVPB 500 mg (500 mg Intravenous New Bag 6/21/25 1835)   sodium chloride 0.9 % infusion (75 mL/hr Intravenous Currently Infusing 6/21/25 0902)   enoxaparin sodium (LOVENOX) syringe 40 mg (40 mg Subcutaneous Given 6/21/25 0918)   famotidine (PEPCID) injection 20 mg (20 mg Intravenous Given 6/21/25 0918)   desvenlafaxine (PRISTIQ) 24 hr tablet 50 mg (50 mg Oral Given 6/21/25 0918)   levothyroxine (SYNTHROID, LEVOTHROID) tablet 75 mcg (75 mcg Oral Given 6/21/25 0918)   HYDROcodone-acetaminophen (NORCO) 5-325 MG per tablet 1 tablet (1 tablet Oral Given 6/21/25 0922)   metroNIDAZOLE (FLAGYL) IVPB 500 mg (0 mg Intravenous Stopped 6/21/25 0142)   cefTRIAXone (ROCEPHIN) 1,000 mg in sodium chloride 0.9 % 100 mL MBP (0 mg Intravenous Stopped 6/21/25 0025)   sodium chloride 0.9 % bolus 1,000 mL (1,000 mL Intravenous New Bag 6/21/25 0142)            Diagnoses that have been ruled out:   None   Diagnoses that are still under consideration:   None   Final diagnoses:   Acute diverticulitis   Urinary tract infection without hematuria, site unspecified        FINAL IMPRESSION   Diagnosis Plan   1. Acute diverticulitis      Sigmoid colon      2. Urinary tract infection without hematuria, site unspecified              Kj Calderon Jr, MD        ED Disposition       ED Disposition   Decision to Admit    Condition   --    Comment   Level of Care: Med/Surg [1]   Diagnosis: Acute diverticulitis [0706039]   Admitting Physician: JUAQUIN SWAN [[id number]]   Attending Physician: JUAQUIN SWAN [[id number]]   Certification: I Certify That Inpatient Hospital Services Are Medically  Necessary For Greater Than 2 Midnights                   Dragon disclaimer:  Part of this note may be an electronic transcription/translation of spoken language to printed text using the Dragon Dictation System.     I have reviewed the patient’s prescription history via a prescription monitoring program.  This information is consistent with my knowledge of the patient’s controlled substance use history.        Kj Calderon Jr., MD  06/21/25 2037      Electronically signed by Kj Calderon Jr., MD at 06/21/25 2037       Vital Signs (last 4 days)       Date/Time Temp Temp src Pulse Resp BP Patient Position SpO2    06/23/25 0751 97.9 (36.6) Oral 62 16 114/59 Lying 94    06/23/25 0354 97.9 (36.6) Oral 71 16 110/67 Lying 95    06/22/25 1949 97.4 (36.3) Oral 67 16 125/69 Lying 96    06/22/25 1649 98 (36.7) Oral 74 16 129/65 Lying 96    06/22/25 1206 97.9 (36.6) Oral 77 16 111/63 Lying 96    06/22/25 0835 97.8 (36.6) Oral 78 16 129/71 Lying 97    06/22/25 0319 98.1 (36.7) Oral 61 16 127/68 Lying 96    06/21/25 2318 98 (36.7) Oral 78 16 119/58 Lying 94    06/21/25 1923 97.9 (36.6) Oral 73 16 115/65 Lying 95    06/21/25 1559 98.5 (36.9) Oral 76 16 104/64 Lying 97    06/21/25 1141 98 (36.7) Oral 88 16 105/60 Lying 97    06/21/25 0756 98.6 (37) Oral 96 16 108/60 Lying 93    06/21/25 0444 98.2 (36.8) Oral 93 16 119/58 Lying 98    06/21/25 0200 97.8 (36.6) Oral 94 18 118/65 Lying 94    06/21/25 0143 -- -- 98 -- 124/72 -- 97    06/20/25 2335 -- -- 114 20 127/83 Sitting 96    06/20/25 2134 98.1 (36.7) Oral 116 20 115/58 -- 99          Current Facility-Administered Medications   Medication Dose Route Frequency Provider Last Rate Last Admin    cefTRIAXone (ROCEPHIN) 1,000 mg in sodium chloride 0.9 % 100 mL MBP  1,000 mg Intravenous Q24H Natalie Fung  mL/hr at 06/22/25 2336 1,000 mg at 06/22/25 2336    desvenlafaxine (PRISTIQ) 24 hr tablet 50 mg  50 mg Oral Daily Natalie Fung MD   50 mg at  06/23/25 0804    enoxaparin sodium (LOVENOX) syringe 40 mg  40 mg Subcutaneous Q24H Natalie Fung MD   40 mg at 06/23/25 0804    famotidine (PEPCID) injection 20 mg  20 mg Intravenous Q12H Natalie Fung MD   20 mg at 06/23/25 0804    HYDROcodone-acetaminophen (NORCO) 5-325 MG per tablet 1 tablet  1 tablet Oral Q6H PRN Natalie Fung MD   1 tablet at 06/21/25 2130    levothyroxine (SYNTHROID, LEVOTHROID) tablet 75 mcg  75 mcg Oral Daily Natalie Fung MD   75 mcg at 06/23/25 0642    metroNIDAZOLE (FLAGYL) IVPB 500 mg  500 mg Intravenous Q8H Natalie Fung  mL/hr at 06/23/25 0804 500 mg at 06/23/25 0804    ondansetron (ZOFRAN) injection 4 mg  4 mg Intravenous Q6H PRN Natalie Fung MD   4 mg at 06/23/25 0044    ondansetron ODT (ZOFRAN-ODT) disintegrating tablet 4 mg  4 mg Translingual Q6H PRN Natalie Fung MD        sodium chloride 0.9 % flush 10 mL  10 mL Intravenous PRN Emergency, Triage Protocol, MD   10 mL at 06/21/25 2131        Physician Progress Notes (last 4 days)        Natalie Fung MD at 06/22/25 0918          Progress Note  Bre Birch  6/22/2025 09:18 CDT  Subjective:   Admit Date:   6/20/2025      CC/ADMIT DX:       Interval History:   Reviewed overnight events and nursing notes.  She has had no c/o chest pain or SOA. Her pain is improved. No N/V.     I have reviewed all labs/diagnostics from the last 24hrs.       ROS:   I have done a 10 point ROS and all are negative, except what is mentioned in the HPI.    No diet orders on file    Medications:      cefTRIAXone, 1,000 mg, Intravenous, Q24H  desvenlafaxine, 50 mg, Oral, Daily  enoxaparin sodium, 40 mg, Subcutaneous, Q24H  famotidine, 20 mg, Intravenous, Q12H  levothyroxine, 75 mcg, Oral, Daily  metroNIDAZOLE, 500 mg, Intravenous, Q8H            Objective:   Vitals: /71 (BP Location: Left arm, Patient Position: Lying)   Pulse 78   Temp 97.8 °F (36.6 °C) (Oral)   Resp 16   Ht  "165.1 cm (65\")   Wt 84.8 kg (187 lb)   SpO2 97%   BMI 31.12 kg/m²    Intake/Output Summary (Last 24 hours) at 2025  Last data filed at 2025 0900  Gross per 24 hour   Intake 630 ml   Output 2000 ml   Net -1370 ml     General appearance: alert and cooperative with exam  Lungs: clear to auscultation bilaterally  Heart: regular rate and rhythm, S1, S2 normal, no murmur, click, rub or gallop  Abdomen: soft, non-tender; bowel sounds normal; no masses,  no organomegaly  Extremities: extremities normal, atraumatic, no cyanosis or edema  Neurologic:  No obvious focal neurologic deficits.    Assessment and Plan:     Acute diverticulitis    Ovarian Cyst    Plan:   Continue present medication(s)    Advance diet and be sure she tolerates   Plan for d/c tomorrow if she does well with diet   D/W pt need for f/u with GYN for repeat testing of ovarian cyst seen on CT      Discharge planning:   her home     Reviewed treatment plans with the patient and/or family.             Electronically signed by Natalie Fung MD on 2025 at 09:18 CDT    Electronically signed by Natalie Fnug MD at 25 0921          Discharge Summary        Figueroa Alvarado MD at 25 0809              Hospital Discharge Summary    Bre Birch  :  1970  MRN:  [id number]    Admit date:  2025  Discharge date:  2025    Admitting Physician:    Rik Berry MD    Discharge Diagnoses:      Acute diverticulitis      Hospital Course:       54-year-old female who presented with abdominal pain secondary diverticulitis.  She was treated with antibiotics and clear liquid diet with resolution of symptoms.  She be discharged on ciprofloxacin and Flagyl for 10 days duration to complete 14 total days of treatment.  Upon her hospital follow-up, she will need to have a discussion regarding colonoscopy and follow-up pelvic ultrasound in 12 weeks to further characterize a cyst.    Discharge Medications:    "      Discharge Medications        New Medications        Instructions Start Date   ciprofloxacin 500 MG tablet  Commonly known as: Cipro   500 mg, Oral, 2 Times Daily      metroNIDAZOLE 500 MG tablet  Commonly known as: FLAGYL   500 mg, Oral, 3 Times Daily             Continue These Medications        Instructions Start Date   cetirizine 10 MG tablet  Commonly known as: zyrTEC   10 mg, Daily      colestipol 1 g tablet  Commonly known as: COLESTID   1 g, Oral, Nightly      desvenlafaxine 100 MG 24 hr tablet  Commonly known as: PRISTIQ   100 mg, Oral, Daily      Estrogens Conjugated 0.625 MG/GM vaginal cream  Commonly known as: PREMARIN   1 g, 3 Times Weekly      fish oil 1000 MG capsule capsule   1,000 mg, Daily With Breakfast      levothyroxine 75 MCG tablet  Commonly known as: SYNTHROID, LEVOTHROID   75 mcg, Daily      losartan-hydrochlorothiazide 50-12.5 MG per tablet  Commonly known as: HYZAAR   1 tablet, Daily      multivitamin with minerals tablet tablet   1 tablet, Daily      omeprazole 40 MG capsule  Commonly known as: priLOSEC   40 mg, Daily      POTASSIUM GLUCONATE PO   1 tablet, Daily      pravastatin 40 MG tablet  Commonly known as: PRAVACHOL   40 mg, Nightly             Stop These Medications      nitrofurantoin (macrocrystal-monohydrate) 100 MG capsule  Commonly known as: Macrobid            Significant Diagnostic Studies:      CT Abdomen Pelvis With Contrast  Result Date: 6/19/2025   1.  Acute sigmoid diverticulitis. No free air or abscess.  2.  3 mm left renal calculus. No ureteral stone or hydronephrosis.  3.  3.3 cm left ovarian cyst. Recommend a follow-up pelvic ultrasound in 6 to 12 weeks based on ACR white paper criteria.    This report was signed and finalized on 6/19/2025 2:00 PM by Dr. Kelechi Galicia MD.           Disposition:   Home  Follow up with Rik Berry MD in 1 weeks.    Signed:  Figueroa Alvarado MD   6/23/2025, 08:09 CDT      Electronically signed by Figueroa Alvarado,  MD at 06/23/25 0810       6/21      Goal Outcome Evaluation: medicated once for complaints of pain this shift, good relief noted. Patient advanced to clear liquids this shift. Patient is tolerating clear liquids       PRN MEDS   6/21 Morphine iv x1    6/23 zofran iv x1

## 2025-06-24 NOTE — OUTREACH NOTE
Prep Survey      Flowsheet Row Responses   [facility]? [facility]   Is LACE score < 7 ? No   Eligibility Readm Mgmt   Discharge diagnosis Acute diverticulitis   Does the patient have one of the following disease processes/diagnoses(primary or secondary)? Other   Does the patient have Home health ordered? No   Is there a DME ordered? No   Prep survey completed? Yes            Maria L NICHOLS - Registered Nurse

## 2025-06-24 NOTE — PAYOR COMM NOTE
"REF: [id number]     [facility]  FAX   [phone]       Bre Flores (54 y.o. Female)       Date of Birth   1970    Social Security Number       Address   [address] [place] [address]    Home Phone   [phone]    MRN   [id number]       [facility]    Marital Status                               Admission Date   2025    Admission Type   Emergency    Admitting Provider   Rik Berry MD    Attending Provider       Department, Room/Bed   [facility] 3C, 362/1       Discharge Date   2025    Discharge Disposition   Home or Self Care    Discharge Destination                                 Attending Provider: (none)   Allergies: Bactrim [Sulfamethoxazole-trimethoprim], Azithromycin, Macrolides And Ketolides    Isolation: None   Infection: None   Code Status: Prior    Ht: 165.1 cm (65\")   Wt: 84.8 kg (187 lb)    Admission Cmt: None   Principal Problem: Acute diverticulitis [K57.92]                   Active Insurance as of 2025       Primary Coverage       Payor Plan Insurance Group Employer/Plan Group    [employer] PPO [id number]       Payor Plan Address Payor Plan Phone Number Payor Plan Fax Number Effective Dates    [address] [phone]  2017 - None Entered    [facility] [id number]         Subscriber Name Subscriber Birth Date Member ID       MONICA FLORES YEIMY 10/30/1968 [id number]                     Emergency Contacts        (Rel.) Home Phone Work Phone Mobile Phone    Monica Flores (Spouse) [phone] -- --    Nohelia Flores (Daughter) -- -- [phone]    Do Flores (Daughter) -- -- [phone]                 Discharge Summary        Figueroa Alvarado MD at 25 0809              Hospital Discharge Summary    Bre Flores  :  1970  MRN:  [id number]    Admit date:  2025  Discharge date:  2025    Admitting Physician:    Rik Berry, " MD    Discharge Diagnoses:      Acute diverticulitis      Hospital Course:       54-year-old female who presented with abdominal pain secondary diverticulitis.  She was treated with antibiotics and clear liquid diet with resolution of symptoms.  She be discharged on ciprofloxacin and Flagyl for 10 days duration to complete 14 total days of treatment.  Upon her hospital follow-up, she will need to have a discussion regarding colonoscopy and follow-up pelvic ultrasound in 12 weeks to further characterize a cyst.    Discharge Medications:         Discharge Medications        New Medications        Instructions Start Date   ciprofloxacin 500 MG tablet  Commonly known as: Cipro   500 mg, Oral, 2 Times Daily      metroNIDAZOLE 500 MG tablet  Commonly known as: FLAGYL   500 mg, Oral, 3 Times Daily             Continue These Medications        Instructions Start Date   cetirizine 10 MG tablet  Commonly known as: zyrTEC   10 mg, Daily      colestipol 1 g tablet  Commonly known as: COLESTID   1 g, Oral, Nightly      desvenlafaxine 100 MG 24 hr tablet  Commonly known as: PRISTIQ   100 mg, Oral, Daily      Estrogens Conjugated 0.625 MG/GM vaginal cream  Commonly known as: PREMARIN   1 g, 3 Times Weekly      fish oil 1000 MG capsule capsule   1,000 mg, Daily With Breakfast      levothyroxine 75 MCG tablet  Commonly known as: SYNTHROID, LEVOTHROID   75 mcg, Daily      losartan-hydrochlorothiazide 50-12.5 MG per tablet  Commonly known as: HYZAAR   1 tablet, Daily      multivitamin with minerals tablet tablet   1 tablet, Daily      omeprazole 40 MG capsule  Commonly known as: priLOSEC   40 mg, Daily      POTASSIUM GLUCONATE PO   1 tablet, Daily      pravastatin 40 MG tablet  Commonly known as: PRAVACHOL   40 mg, Nightly             Stop These Medications      nitrofurantoin (macrocrystal-monohydrate) 100 MG capsule  Commonly known as: Macrobid            Significant Diagnostic Studies:      CT Abdomen Pelvis With Contrast  Result  Date: 6/19/2025   1.  Acute sigmoid diverticulitis. No free air or abscess.  2.  3 mm left renal calculus. No ureteral stone or hydronephrosis.  3.  3.3 cm left ovarian cyst. Recommend a follow-up pelvic ultrasound in 6 to 12 weeks based on ACR white paper criteria.    This report was signed and finalized on 6/19/2025 2:00 PM by Dr. Kelechi Galicia MD.           Disposition:   Home  Follow up with Rik Berry MD in 1 weeks.    Signed:  Kaela Sy MD   6/23/2025, 08:09 CDT      Electronically signed by Kaela Sy MD at 06/23/25 0810       Discharge Order (From admission, onward)       Start     Ordered    06/23/25 0809  Discharge patient  Once        Expected Discharge Date: 06/23/25   Expected Discharge Time: Morning   Discharge Disposition: Home or Self Care   Physician of Record for Attribution - Please select from Treatment Team: KAELA SY [[id number]]   Review needed by CMO to determine Physician of Record: No      Question Answer Comment   Physician of Record for Attribution - Please select from Treatment Team KAELA SY    Review needed by CMO to determine Physician of Record No        06/23/25 0809

## 2025-06-30 ENCOUNTER — OFFICE VISIT (OUTPATIENT)
Age: 55
End: 2025-06-30
Payer: COMMERCIAL

## 2025-06-30 VITALS
HEIGHT: 65 IN | SYSTOLIC BLOOD PRESSURE: 108 MMHG | BODY MASS INDEX: 31.16 KG/M2 | WEIGHT: 187 LBS | DIASTOLIC BLOOD PRESSURE: 80 MMHG

## 2025-06-30 DIAGNOSIS — N83.209 CYST OF OVARY, UNSPECIFIED LATERALITY: Primary | ICD-10-CM

## 2025-06-30 NOTE — PROGRESS NOTES
"Chief Complaint  Gynecologic Exam (/Pt is here wanting to discuss her CT results from 6/19/25.  Pt was seen in ER and admitted to the hospital with diverticulitis.  CT that was performed during this time showed a left ovarian cyst measuring 3.3 cm.  Report recommended an Us and FU in 12 weeks to further characterize cyst. )  History of Present Illness  The patient presents for evaluation of ovarian cyst.    She reports a significant improvement in her condition, attributing it to the resolution of an acute diverticulitis episode. The CT indicated ovarian cyst and she was advised to follow up with gyn and have follow up US.     Subjective          Bre Birch presents to [facility]  History of Present Illness    Review of Systems   Genitourinary:  Negative for menstrual problem and pelvic pain.         Objective   Vital Signs:   /80   Ht 165.1 cm (65\")   Wt 84.8 kg (187 lb)   BMI 31.12 kg/m²     Physical Exam  Physical Exam  General Appearance: Patient appears much better and in no acute distress.      Result Review :   The following data was reviewed by: VINAY Linda on 06/30/2025:  OBGYN Diagnostics Review:   Lab Results   Component Value Date    CASEREPORT  05/08/2025     Gynecologic Cytology Report                       Case: [id number]                                  Authorizing Provider:  Rosanne Cardoza APRN Collected:           05/08/2025 10:07 AM          Ordering Location:     [facility]     Received:            05/09/2025 06:02 AM                                 [facility]                                                                  Rescreen:              My Jansen                                                              Pathologist:           Rosalino Mantilla MD                                                      Specimen:    Liquid-Based Pap, Screening, Cervix                                                        " INTERPGYN Negative for intraepithelial lesion or malignancy 05/08/2025    GENCAT Within normal limits 05/08/2025    SPECADGYN  05/08/2025     Satisfactory for evaluation, endocervical/transformation zone component present  Partially obscuring inflammation    ADDINFO  05/08/2025     Disclaimer: Cervical cytology is a screening test primarily for squamous cancer and its precursors and has associated false-negative and false-positive results.  Technologies such as liquid-based preparations may decrease but will not eliminate all false-negative results.  Follow-up of unexplained clinical signs and symptoms is recommended to minimize false-negative results. (The [place] System for Reporting Cervical Cytology: [place], 2015).        HPV Aptima   Date Value Ref Range Status   05/08/2025 Not Detected Not Detected Final      Data reviewed : Radiologic studies abd/pelvic CT   Narrative & Impression   EXAM/TECHNIQUE: CT abdomen pelvis with IV contrast     INDICATION: left pyelonephritis; N10-Acute pyelonephritis     COMPARISON: 5/14/2025     DLP: 545.73 mGy.cm. Automated exposure control was utilized to decrease  patient radiation dose.     FINDINGS:     LOWER CHEST: No acute findings.     LIVER AND BILIARY: No suspicious liver lesion. The gallbladder is  surgically absent. No biliary dilatation.     PANCREAS: Unremarkable.      SPLEEN: Unremarkable.      ADRENAL: Unremarkable.     KIDNEYS/BLADDER: No solid renal mass. 3 mm left upper pole renal  calculus. No ureteral stone or hydronephrosis. Urinary bladder is  decompressed.     BOWEL: 4 cm segment of sigmoid colon wall thickening with surrounding  fat stranding in a background of diverticulosis. Findings are in keeping  with acute sigmoid diverticulitis. No free air or abscess. Normal  appendix. Small hiatal hernia.     PERITONEUM: No ascites.      PELVIC ORGANS: 3.3 cm left ovarian cyst. Uterus and adnexa are otherwise  unremarkable. Tubal ligation clips are noted.      VASCULATURE: Abdominal aorta is nonaneurysmal.     LYMPH NODES: No enlarged lymph nodes.      SOFT TISSUES: No acute findings.     BONES: No acute findings.     IMPRESSION:     1.  Acute sigmoid diverticulitis. No free air or abscess.     2.  3 mm left renal calculus. No ureteral stone or hydronephrosis.     3.  3.3 cm left ovarian cyst. Recommend a follow-up pelvic ultrasound in  6 to 12 weeks based on ACR white paper criteria.           This report was signed and finalized on 6/19/2025 2:00 PM by Dr. Kelechi Galicia MD.        Current Outpatient Medications on File Prior to Visit   Medication Sig    cetirizine (zyrTEC) 10 MG tablet Take 1 tablet by mouth Daily.    ciprofloxacin (Cipro) 500 MG tablet Take 1 tablet by mouth 2 (Two) Times a Day.    colestipol (COLESTID) 1 g tablet Take 1 tablet by mouth Every Night.    desvenlafaxine (PRISTIQ) 100 MG 24 hr tablet Take 1 tablet by mouth Daily.    Estrogens Conjugated (PREMARIN) 0.625 MG/GM vaginal cream Insert 1 g into the vagina 3 (Three) Times a Week.    levothyroxine (SYNTHROID, LEVOTHROID) 75 MCG tablet Take 1 tablet by mouth Daily.    losartan-hydrochlorothiazide (HYZAAR) 50-12.5 MG per tablet Take 1 tablet by mouth Daily.    metroNIDAZOLE (FLAGYL) 500 MG tablet Take 1 tablet by mouth 3 (Three) Times a Day.    multivitamin with minerals tablet tablet Take 1 tablet by mouth Daily.    Omega-3 Fatty Acids (FISH OIL) 1000 MG capsule capsule Take 1 capsule by mouth Daily With Breakfast.    omeprazole (priLOSEC) 40 MG capsule Take 1 capsule by mouth Daily.    POTASSIUM GLUCONATE PO Take 1 tablet by mouth Daily.    pravastatin (PRAVACHOL) 40 MG tablet Take 1 tablet by mouth Every Night.     No current facility-administered medications on file prior to visit.       Assessment & Plan  1. Ovarian cyst  - Identified on CT scan; nature of the cyst (fluid-filled, tissue-filled, or blood-filled) is unclear  - Obtain US in 6 weeks to assess resolution  - If cyst is complex,  additional labs and workup will be required      Diagnoses and all orders for this visit:    1. Cyst of ovary, unspecified laterality (Primary)                  Follow Up   Return in about 6 weeks (around 8/11/2025) for US and OV.    Patient was given instructions and counseling regarding her condition or for health maintenance advice. Please see specific information pulled into the AVS if appropriate.       Patient or patient representative verbalized consent for the use of Ambient Listening during the visit with  VINAY Linda for chart documentation. 6/30/2025  11:25 CDT

## 2025-06-30 NOTE — PATIENT INSTRUCTIONS

## 2025-07-03 ENCOUNTER — READMISSION MANAGEMENT (OUTPATIENT)
Dept: CALL CENTER | Facility: HOSPITAL | Age: 55
End: 2025-07-03
Payer: COMMERCIAL

## 2025-07-03 ENCOUNTER — OFFICE VISIT (OUTPATIENT)
Dept: GASTROENTEROLOGY | Age: 55
End: 2025-07-03
Payer: COMMERCIAL

## 2025-07-03 VITALS
WEIGHT: 190 LBS | SYSTOLIC BLOOD PRESSURE: 120 MMHG | HEIGHT: 65 IN | OXYGEN SATURATION: 96 % | HEART RATE: 91 BPM | DIASTOLIC BLOOD PRESSURE: 80 MMHG | BODY MASS INDEX: 31.65 KG/M2

## 2025-07-03 DIAGNOSIS — K21.9 GASTROESOPHAGEAL REFLUX DISEASE, UNSPECIFIED WHETHER ESOPHAGITIS PRESENT: ICD-10-CM

## 2025-07-03 DIAGNOSIS — R19.7 DIARRHEA, UNSPECIFIED TYPE: Primary | ICD-10-CM

## 2025-07-03 PROCEDURE — 99213 OFFICE O/P EST LOW 20 MIN: CPT | Performed by: NURSE PRACTITIONER

## 2025-07-03 RX ORDER — POTASSIUM CHLORIDE 750 MG/1
10 TABLET, EXTENDED RELEASE ORAL DAILY
COMMUNITY
Start: 2025-04-14

## 2025-07-03 RX ORDER — PRAVASTATIN SODIUM 20 MG
20 TABLET ORAL DAILY
COMMUNITY

## 2025-07-03 RX ORDER — LOSARTAN POTASSIUM 50 MG/1
50 TABLET ORAL DAILY
COMMUNITY

## 2025-07-03 RX ORDER — MULTIVITAMIN WITH IRON
1 TABLET ORAL DAILY
COMMUNITY

## 2025-07-03 NOTE — OUTREACH NOTE
Medical Week 2 Survey      Flowsheet Row Responses   [facility] patient discharged from? [facility]   Does the patient have one of the following disease processes/diagnoses(primary or secondary)? Other   Week 2 attempt successful? Yes   Call start time 1533   Discharge diagnosis Acute diverticulitis   Call end time 1538   Meds reviewed with patient/caregiver? Yes   Is the patient having any side effects they believe may be caused by any medication additions or changes? No   Does the patient have all medications ordered at discharge? Yes   Is the patient taking all medications as directed (includes completed medication regime)? Yes   Does the patient have a primary care provider?  Yes   Does the patient have an appointment with their PCP within 7 days of discharge? Yes   Has the patient kept scheduled appointments due by today? Yes   Has home health visited the patient within 72 hours of discharge? N/A   Psychosocial issues? 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 Yes   Nursing interventions Reviewed instructions with patient   What is the patient's perception of their health status since discharge? Improving   Is the patient/caregiver able to teach back signs and symptoms related to disease process for when to call PCP? Yes   Is the patient/caregiver able to teach back signs and symptoms related to disease process for when to call 911? Yes   Is the patient/caregiver able to teach back the hierarchy of who to call/visit for symptoms/problems? PCP, Specialist, Home health nurse, Urgent Care, ED, 911 Yes   If the patient is a current smoker, are they able to teach back resources for cessation? Not a smoker   Week 2 Call Completed? Yes   Call end time 1538            Marya NICHOLS - Registered Nurse

## 2025-07-03 NOTE — PROGRESS NOTES
02/12/2016    Dr Long-Mild chronic cholecystitis    COLONOSCOPY N/A 03/26/2019    Dr CAMILA Cedeno-Diverticular disease-BCM    COLONOSCOPY N/A 09/23/2024    Dr CAMILA Cedeno-Diverticular disease-BC, 10 yr recall    EGD  1990    Dr. Smith    FOOT SURGERY      HERNIA REPAIR  05/02/2024    hiatal hernia    LAPAROSCOPIC NISSEN FUNDOPLICATION  05/2024    [facility] EGD TRANSORAL BIOPSY SINGLE/MULTIPLE N/A 11/13/2018    Dr CAMILA Cedeno-w/dilation over wire-48 [place]-hiatal hernia, esophageal stricture-Gastritis    SKIN BIOPSY Right     leg- birth jameel removed    UPPER GASTROINTESTINAL ENDOSCOPY  11/13/2018    Dr CAMILA Cedeno-w/dilation over wire-48 [place]-hiatal hernia, esophageal stricture-Gastritis    UPPER GASTROINTESTINAL ENDOSCOPY N/A 02/08/2024    Dr CAMILA Cedeno-w/[id number] benign appearing stricture in the distal esophagus, HH       Family History   Problem Relation Age of Onset    Hypertension Father     Colon Cancer Maternal Grandfather         unknown age    Colon Polyps Neg Hx     Esophageal Cancer Neg Hx     Liver Cancer Neg Hx     Liver Disease Neg Hx     Rectal Cancer Neg Hx     Stomach Cancer Neg Hx        Social History     Socioeconomic History    Marital status:    Tobacco Use    Smoking status: Never    Smokeless tobacco: Never   Vaping Use    Vaping status: Never Used   Substance and Sexual Activity    Alcohol use: No    Drug use: No     Social Drivers of Health      [facility], [facility]    Family and Community Support   Intimate Partner Violence: Not At Risk (6/21/2025)    Received from [facility]    Abuse Screen     Feels Unsafe at Home or Work/School: no     Feels Threatened by Someone: no     Does Anyone Try to Keep You From Having Contact with Others or Doing Things Outside Your Home?: no     Physical Signs of Abuse Present: no   Housing Stability: Unknown (6/21/2025)    Received from [facility]    Housing Stability     Current Living

## 2025-07-07 ENCOUNTER — OFFICE VISIT (OUTPATIENT)
Dept: SURGERY | Facility: CLINIC | Age: 55
End: 2025-07-07
Payer: COMMERCIAL

## 2025-07-07 VITALS
DIASTOLIC BLOOD PRESSURE: 80 MMHG | WEIGHT: 187 LBS | HEIGHT: 65 IN | SYSTOLIC BLOOD PRESSURE: 108 MMHG | BODY MASS INDEX: 31.16 KG/M2

## 2025-07-07 DIAGNOSIS — M62.08 DIASTASIS RECTI: ICD-10-CM

## 2025-07-07 DIAGNOSIS — K57.92 DIVERTICULITIS: ICD-10-CM

## 2025-07-07 DIAGNOSIS — E66.811 CLASS 1 OBESITY DUE TO EXCESS CALORIES WITH BODY MASS INDEX (BMI) OF 30.0 TO 30.9 IN ADULT, UNSPECIFIED WHETHER SERIOUS COMORBIDITY PRESENT: ICD-10-CM

## 2025-07-07 DIAGNOSIS — N60.01 BENIGN BREAST CYST IN FEMALE, RIGHT: ICD-10-CM

## 2025-07-07 DIAGNOSIS — E66.09 CLASS 1 OBESITY DUE TO EXCESS CALORIES WITH BODY MASS INDEX (BMI) OF 30.0 TO 30.9 IN ADULT, UNSPECIFIED WHETHER SERIOUS COMORBIDITY PRESENT: ICD-10-CM

## 2025-07-07 DIAGNOSIS — N60.11 FIBROCYSTIC DISEASE OF BOTH BREASTS: Primary | ICD-10-CM

## 2025-07-07 DIAGNOSIS — N60.12 FIBROCYSTIC DISEASE OF BOTH BREASTS: Primary | ICD-10-CM

## 2025-07-07 PROCEDURE — 99214 OFFICE O/P EST MOD 30 MIN: CPT | Performed by: STUDENT IN AN ORGANIZED HEALTH CARE EDUCATION/TRAINING PROGRAM

## 2025-07-07 NOTE — PROGRESS NOTES
Office Established Patient Note:     Referring Provider: No ref. provider found    Chief Complaint   Patient presents with    Follow-up       Subjective .     History of present illness:    History of Present Illness  The patient presents for evaluation of breast cyst, diverticulitis, diastasis recti, and ovarian cyst.    She reports no significant changes in her breast condition, with no current signs of inflammation or cysts. She attributes this stability to recent weight gain, which she believes may have caused a shift in her breasts. She is not experiencing any pain or discharge from the breasts.    She was diagnosed with diverticulitis during her last scan, which also revealed an ovarian cyst and a kidney stone. The scan was prompted by severe abdominal pain that led to a hospital visit. This was her first experience with diverticulitis. She has not been prescribed fiber supplements but is interested in starting them. She does not consume coffee or tea and reports no constipation or hard stools. She underwent a colonoscopy in 2024 at Dr. Kiet Andrade's office, and a follow-up colonoscopy is planned unless she experiences pain. Her treatment for diverticulitis included Flagyl and Cipro.    She has discontinued physical therapy for her diastasis recti due to discomfort and is considering surgical intervention this year.    She has an ovarian cyst. An ultrasound of her ovary is scheduled.    SOCIAL HISTORY  Exercise: Exercises provided by physical therapy to do at home.  Alcohol: Not a big drinker.  Coffee/Tea/Caffeine-containing Drinks: Does not drink coffee or tea.       History  Past Medical History:   Diagnosis Date    Acute diverticulitis     Anxiety     Diastasis recti     GERD (gastroesophageal reflux disease)     Hiatal hernia     Hyperlipidemia     Hypothyroid     Kidney stones     Obstructive sleep apnea     Ovarian cyst     UTI (urinary tract infection)    ,   Past Surgical History:   Procedure Laterality  Date    BREAST BIOPSY Left 2 yrs ago    CHOLECYSTECTOMY      COLONOSCOPY      ENDOMETRIAL ABLATION      ENDOSCOPY      FOOT ARTHRODESIS, MODIFIED DOUGLSAS      HERNIA REPAIR      HIATAL HERNIA REPAIR N/A 5/2/2024    Procedure: LAPAROSCOPIC HIATAL HERNIA REPAIR WITH DAVINCI ROBOT;  Surgeon: Uli Marte MD;  Location: [facility];  Service: Robotics - DaVinci;  Laterality: N/A;    TUBAL ABDOMINAL LIGATION     ,   Family History   Problem Relation Age of Onset    Hypertension Father     Heart disease Father     Alcohol abuse Father     Stroke Mother     COPD Mother     Hypertension Brother     No Known Problems Daughter     No Known Problems Paternal Grandmother     No Known Problems Maternal Grandmother     Cancer Maternal Grandfather     No Known Problems Maternal Aunt     No Known Problems Paternal Aunt     BRCA 1/2 Neg Hx     Breast cancer Neg Hx     Colon cancer Neg Hx     Endometrial cancer Neg Hx     Ovarian cancer Neg Hx    ,   Social History     Tobacco Use    Smoking status: Never     Passive exposure: Never    Smokeless tobacco: Never   Vaping Use    Vaping status: Never Used   Substance Use Topics    Alcohol use: No    Drug use: No   , (Not in a hospital admission)   and Allergies:  Bactrim [sulfamethoxazole-trimethoprim], Azithromycin, and Macrolides and ketolides    Current Outpatient Medications:     cetirizine (zyrTEC) 10 MG tablet, Take 1 tablet by mouth Daily., Disp: , Rfl:     colestipol (COLESTID) 1 g tablet, Take 1 tablet by mouth Every Night., Disp: , Rfl:     desvenlafaxine (PRISTIQ) 100 MG 24 hr tablet, Take 1 tablet by mouth Daily., Disp: , Rfl:     Estrogens Conjugated (PREMARIN) 0.625 MG/GM vaginal cream, Insert 1 g into the vagina 3 (Three) Times a Week., Disp: , Rfl:     levothyroxine (SYNTHROID, LEVOTHROID) 75 MCG tablet, Take 1 tablet by mouth Daily., Disp: , Rfl:     losartan-hydrochlorothiazide (HYZAAR) 50-12.5 MG per tablet, Take 1 tablet by mouth Daily., Disp: , Rfl:      "multivitamin with minerals tablet tablet, Take 1 tablet by mouth Daily., Disp: , Rfl:     Omega-3 Fatty Acids (FISH OIL) 1000 MG capsule capsule, Take 1 capsule by mouth Daily With Breakfast., Disp: , Rfl:     omeprazole (priLOSEC) 40 MG capsule, Take 1 capsule by mouth Daily., Disp: , Rfl:     POTASSIUM GLUCONATE PO, Take 1 tablet by mouth Daily., Disp: , Rfl:     pravastatin (PRAVACHOL) 40 MG tablet, Take 1 tablet by mouth Every Night., Disp: , Rfl:     ciprofloxacin (Cipro) 500 MG tablet, Take 1 tablet by mouth 2 (Two) Times a Day. (Patient not taking: Reported on 7/7/2025), Disp: 20 tablet, Rfl: 0    metroNIDAZOLE (FLAGYL) 500 MG tablet, Take 1 tablet by mouth 3 (Three) Times a Day. (Patient not taking: Reported on 7/7/2025), Disp: 30 tablet, Rfl: 0    Objective     Vital Signs   /80   Ht 165.1 cm (65\")   Wt 84.8 kg (187 lb)   BMI 31.12 kg/m²      Physical Exam:  General appearance - alert, well appearing, and in no distress  Mental status - alert, oriented to person, place, and time  Eyes - pupils equal and reactive, extraocular eye movements intact  Abdomen - soft, nontender, nondistended, no masses or organomegaly  Neurological - alert, oriented, normal speech, no focal findings or movement disorder noted  Breast Exam: Bilateral breasts without obvious deformities. Bilateral nipples everted. Patient examined in the supine position with the ipsilateral arm above the head. No palpable masses bilaterally. No nipple discharge bilaterally. No palpable axillary nor supraclavicular adenopathy bilaterally.   Physical Exam       Results Review:    The following data was reviewed by: Esthela Cole MD on 07/07/2025:    CT Abdomen Pelvis With Contrast (06/19/2025 13:38)    4 cm segment of sigmoid colon wall thickening with surrounding fat stranding in a background of diverticulosis. Findings are in keeping with acute sigmoid diverticulitis. No free air or abscess.   Colonoscopy report from 9/23/24 " reviewed.   Contrasted Mammo Diagnostic Digital Bilateral with CAD (01/07/2025 12:21)   US Breast Right Limited (01/07/2025 12:37)   Results  Imaging   - Mammogram: 01/2025, Normal    Diagnostic Testing   - Colonoscopy: 09/2024, Normal       Assessment & Plan       Diagnoses and all orders for this visit:    1. Fibrocystic disease of both breasts (Primary)  -     Contrasted Mammo Diagnostic Digital Bilateral with CAD; Future    2. Benign breast cyst in female, right  -     Contrasted Mammo Diagnostic Digital Bilateral with CAD; Future    3. Diastasis recti    4. Class 1 obesity due to excess calories with body mass index (BMI) of 30.0 to 30.9 in adult, unspecified whether serious comorbidity present    5. Diverticulitis       Assessment & Plan  1. Breast cyst.  Mammogram results from 01/2025 were satisfactory; I personally reviewed the mammogram and US. A contrasted mammogram has been ordered for 01/2026 due to dense breast tissue for screening. If she has a flare of a cyst, she is to call the office.     2. Diverticulitis.  Previously treated with Flagyl and Cipro. I personally reviewed the CT above. Advised to take Benefiber daily, preferably dissolved in hot liquid, to prevent further flares. If experiencing hard stools, a stool softener like Colace can be used. In case of a diverticulitis flare, contact the office immediately. Augmentin will be prescribed, and a CT scan will be arranged, followed by a consultation.    3. Diastasis recti.  Stopped physical therapy due to discomfort. Considering surgery this year with a plastic surgeon.     4. Ovarian cyst.  An ultrasound has been scheduled to monitor the ovarian cyst.       Esthela Cole MD  07/07/25  15:23 CDT    Patient or patient representative verbalized consent for the use of Ambient Listening during the visit with  Esthela Cole MD for chart documentation. 7/8/2025  07:36 CDT

## 2025-07-07 NOTE — PATIENT INSTRUCTIONS

## 2025-07-09 ASSESSMENT — ENCOUNTER SYMPTOMS
DIARRHEA: 0
ABDOMINAL PAIN: 0
RECTAL PAIN: 0
SHORTNESS OF BREATH: 0
NAUSEA: 0
ABDOMINAL DISTENTION: 0
COUGH: 0
ANAL BLEEDING: 0
VOMITING: 0
CONSTIPATION: 0
BLOOD IN STOOL: 0
CHOKING: 0
TROUBLE SWALLOWING: 0

## 2025-07-11 NOTE — PROGRESS NOTES
Subjective    Ms. Birch is 54 y.o. female    Chief Complaint: return of left flank and lower abdominal pain x 12hours    History of Present Illness    54-year-old female established patient in with complaint return of left-sided flank pain and lower abdominal pain x 12 hours.  Previously seen in our clinic due to episode of left-sided pyelonephritis found on CT abdomen and pelvis imaging May of this year after patient presented due to ongoing complaint of left-sided flank pain suprapubic discomfort and pressure.  Was noted on CT to have retroperitoneal fat stranding around the renal pelvis and proximal ureter for which she was discharged on cefdinir x 7 days along with 2 additional rounds of antibiotics through PCP.  Unfortunately patient continued to experience mild discomfort patient's urine was sent back to culture in our office the guidance UTI however patient was noted to have multiple different bacteria present was instructed if no improvement in symptoms after the antibiotic would warrant repeat CT imaging.  Patient CT imaging completed while I was out on vacation revealing no further pyelonephritis seen however was found to have acute diverticulitis for which resulted in an ER visit and a 3 night stay in the hospital for IV antibiotic therapy and clear liquid diet.  Since discharge patient reports had been feeling well until today when symptoms returned.      The following portions of the patient's history were reviewed and updated as appropriate: allergies, current medications, past family history, past medical history, past social history, past surgical history and problem list.    Review of Systems   Constitutional:  Negative for chills and fever.   Gastrointestinal:  Negative for abdominal pain, anal bleeding and blood in stool.   Genitourinary:  Negative for dysuria and hematuria.   Musculoskeletal:  Positive for back pain.         Current Outpatient Medications:     cetirizine (zyrTEC) 10 MG tablet,  Take 1 tablet by mouth Daily., Disp: , Rfl:     colestipol (COLESTID) 1 g tablet, Take 1 tablet by mouth Every Night., Disp: , Rfl:     desvenlafaxine (PRISTIQ) 100 MG 24 hr tablet, Take 1 tablet by mouth Daily., Disp: , Rfl:     Estrogens Conjugated (PREMARIN) 0.625 MG/GM vaginal cream, Insert 1 g into the vagina 3 (Three) Times a Week., Disp: , Rfl:     levothyroxine (SYNTHROID, LEVOTHROID) 75 MCG tablet, Take 1 tablet by mouth Daily., Disp: , Rfl:     losartan (COZAAR) 50 MG tablet, Take 1 tablet by mouth Daily., Disp: , Rfl:     losartan-hydrochlorothiazide (HYZAAR) 50-12.5 MG per tablet, Take 1 tablet by mouth Daily., Disp: , Rfl:     multivitamin (Multiple Vitamins) tablet tablet, Take 1 tablet by mouth Daily., Disp: , Rfl:     multivitamin with minerals tablet tablet, Take 1 tablet by mouth Daily., Disp: , Rfl:     Omega-3 Fatty Acids (FISH OIL) 1000 MG capsule capsule, Take 1 capsule by mouth Daily With Breakfast., Disp: , Rfl:     omeprazole (priLOSEC) 40 MG capsule, Take 1 capsule by mouth Daily., Disp: , Rfl:     potassium chloride 10 MEQ CR tablet, Take 1 tablet by mouth Daily., Disp: , Rfl:     POTASSIUM GLUCONATE PO, Take 1 tablet by mouth Daily., Disp: , Rfl:     pravastatin (PRAVACHOL) 40 MG tablet, Take 1 tablet by mouth Every Night., Disp: , Rfl:     Past Medical History:   Diagnosis Date    Acute diverticulitis     Anxiety     Diastasis recti     GERD (gastroesophageal reflux disease)     Hiatal hernia     Hyperlipidemia     Hypothyroid     Kidney stones     Obstructive sleep apnea     Ovarian cyst     UTI (urinary tract infection)        Past Surgical History:   Procedure Laterality Date    BREAST BIOPSY Left 2 yrs ago    CHOLECYSTECTOMY      COLONOSCOPY      ENDOMETRIAL ABLATION      ENDOSCOPY      FOOT ARTHRODESIS, MODIFIED DOUGLASS      HERNIA REPAIR      HIATAL HERNIA REPAIR N/A 5/2/2024    Procedure: LAPAROSCOPIC HIATAL HERNIA REPAIR WITH DAVINCI ROBOT;  Surgeon: Uli Marte MD;   "Location: [facility] OR;  Service: Robotics - DaVinci;  Laterality: N/A;    TUBAL ABDOMINAL LIGATION         Social History     Socioeconomic History    Marital status:    Tobacco Use    Smoking status: Never     Passive exposure: Never    Smokeless tobacco: Never   Vaping Use    Vaping status: Never Used   Substance and Sexual Activity    Alcohol use: No    Drug use: No    Sexual activity: Yes     Partners: Male     Birth control/protection: Surgical       Family History   Problem Relation Age of Onset    Hypertension Father     Heart disease Father     Alcohol abuse Father     Stroke Mother     COPD Mother     Hypertension Brother     No Known Problems Daughter     No Known Problems Paternal Grandmother     No Known Problems Maternal Grandmother     Cancer Maternal Grandfather     No Known Problems Maternal Aunt     No Known Problems Paternal Aunt     BRCA 1/2 Neg Hx     Breast cancer Neg Hx     Colon cancer Neg Hx     Endometrial cancer Neg Hx     Ovarian cancer Neg Hx        Objective    Temp 97 °F (36.1 °C)   Ht 165.1 cm (65\")   Wt 84.8 kg (187 lb)   BMI 31.12 kg/m²     Physical Exam        Results for orders placed or performed in visit on 07/17/25   POC Urinalysis Dipstick, Multipro    Collection Time: 07/17/25  3:13 PM    Specimen: Urine   Result Value Ref Range    Color Yellow Yellow, Straw, Dark Yellow, Carly    Clarity, UA Cloudy (A) Clear    Glucose, UA Negative Negative mg/dL    Bilirubin Negative Negative    Ketones, UA Negative Negative    Specific Gravity  1.030 1.005 - 1.030    Blood, UA Large (A) Negative    pH, Urine 5.5 5.0 - 8.0    Protein, POC Trace (A) Negative mg/dL    Urobilinogen, UA 0.2 E.U./dL Normal, 0.2 E.U./dL    Nitrite, UA Negative Negative    Leukocytes Trace (A) Negative   CT Abdomen Pelvis With Contrast (06/19/2025 13:38)   Assessment and Plan    Diagnoses and all orders for this visit:    1. Acute pyelonephritis (Primary)  -     POC Urinalysis Dipstick, Multipro    2. " Kidney stone  -     POC Urinalysis Dipstick, Multipro      Previously found pyelonephritis appeared to have resolved on most recent CT imaging from 6/19/2025    Patient with return of pain as of this morning will send urine through resolve [facility] urine culture PCR testing will wait to treat until culture returns.  Have instructed patient ultimately given her recent fernandes with acute diverticulitis if symptoms do not improve or worsen over the weekend may warrant further evaluation through PCP/ER

## 2025-07-14 ENCOUNTER — READMISSION MANAGEMENT (OUTPATIENT)
Dept: CALL CENTER | Facility: HOSPITAL | Age: 55
End: 2025-07-14
Payer: COMMERCIAL

## 2025-07-14 NOTE — OUTREACH NOTE
Medical Week 3 Survey      Flowsheet Row Responses   [facility] patient discharged from? [place]   Does the patient have one of the following disease processes/diagnoses(primary or secondary)? Other   Week 3 attempt successful? No   Unsuccessful attempts Attempt 1   Revoke Pricilla Kincaid H - Registered Nurse

## 2025-07-17 ENCOUNTER — OFFICE VISIT (OUTPATIENT)
Dept: UROLOGY | Facility: CLINIC | Age: 55
End: 2025-07-17
Payer: COMMERCIAL

## 2025-07-17 VITALS — HEIGHT: 65 IN | WEIGHT: 187 LBS | TEMPERATURE: 97 F | BODY MASS INDEX: 31.16 KG/M2

## 2025-07-17 DIAGNOSIS — N20.0 KIDNEY STONE: ICD-10-CM

## 2025-07-17 DIAGNOSIS — N10 ACUTE PYELONEPHRITIS: Primary | ICD-10-CM

## 2025-07-17 LAB
BILIRUB BLD-MCNC: NEGATIVE MG/DL
CLARITY, POC: ABNORMAL
COLOR UR: YELLOW
GLUCOSE UR STRIP-MCNC: NEGATIVE MG/DL
KETONES UR QL: NEGATIVE
LEUKOCYTE EST, POC: ABNORMAL
NITRITE UR-MCNC: NEGATIVE MG/ML
PH UR: 5.5 [PH] (ref 5–8)
PROT UR STRIP-MCNC: ABNORMAL MG/DL
RBC # UR STRIP: ABNORMAL /UL
SP GR UR: 1.03 (ref 1–1.03)
UROBILINOGEN UR QL: ABNORMAL

## 2025-07-17 RX ORDER — LOSARTAN POTASSIUM 50 MG/1
50 TABLET ORAL DAILY
COMMUNITY

## 2025-07-17 RX ORDER — DIPHENOXYLATE HYDROCHLORIDE AND ATROPINE SULFATE 2.5; .025 MG/1; MG/1
1 TABLET ORAL DAILY
COMMUNITY

## 2025-07-17 RX ORDER — POTASSIUM CHLORIDE 750 MG/1
10 TABLET, EXTENDED RELEASE ORAL DAILY
COMMUNITY
Start: 2025-04-14

## 2025-07-21 ENCOUNTER — RESULTS FOLLOW-UP (OUTPATIENT)
Dept: UROLOGY | Facility: CLINIC | Age: 55
End: 2025-07-21
Payer: COMMERCIAL

## 2025-07-21 ENCOUNTER — TELEPHONE (OUTPATIENT)
Dept: UROLOGY | Facility: CLINIC | Age: 55
End: 2025-07-21
Payer: COMMERCIAL

## 2025-07-21 ENCOUNTER — DOCUMENTATION (OUTPATIENT)
Dept: UROLOGY | Facility: CLINIC | Age: 55
End: 2025-07-21
Payer: COMMERCIAL

## 2025-07-21 DIAGNOSIS — N30.00 ACUTE CYSTITIS WITHOUT HEMATURIA: Primary | ICD-10-CM

## 2025-07-21 RX ORDER — NITROFURANTOIN 25; 75 MG/1; MG/1
100 CAPSULE ORAL 2 TIMES DAILY
Qty: 14 CAPSULE | Refills: 0 | Status: SHIPPED | OUTPATIENT
Start: 2025-07-21

## 2025-07-21 NOTE — TELEPHONE ENCOUNTER
----- Message from Elayne Tillman sent at 7/21/2025  8:59 AM CDT -----  Regarding: FW:  Urine culture positive enterococcus bacteria have sent in macrobid to pharmacy  ----- Message -----  From: Shannan Blackmon Incoming  Sent: 7/21/2025   8:14 AM CDT  To: VINAY Flores

## 2025-07-21 NOTE — TELEPHONE ENCOUNTER
Left patient a voicemail   Urine culture positive enterococcus bacteria have sent in macrobid to pharmacy .    HUB can read

## 2025-07-21 NOTE — TELEPHONE ENCOUNTER
Pt called back. Went over urine culture results and told her that Macrobid had been called into her pharmacy. Pt expressed v/u.

## 2025-07-25 ENCOUNTER — HOSPITAL ENCOUNTER (EMERGENCY)
Facility: HOSPITAL | Age: 55
Discharge: HOME | End: 2025-07-26
Attending: STUDENT IN AN ORGANIZED HEALTH CARE EDUCATION/TRAINING PROGRAM
Payer: COMMERCIAL

## 2025-07-25 ENCOUNTER — APPOINTMENT (OUTPATIENT)
Dept: GENERAL RADIOLOGY | Facility: HOSPITAL | Age: 55
End: 2025-07-25
Payer: COMMERCIAL

## 2025-07-25 DIAGNOSIS — N39.0 ACUTE UTI: ICD-10-CM

## 2025-07-25 DIAGNOSIS — E87.6 HYPOKALEMIA: ICD-10-CM

## 2025-07-25 DIAGNOSIS — L50.9 HIVES: Primary | ICD-10-CM

## 2025-07-25 LAB
ALBUMIN SERPL-MCNC: 4.3 G/DL (ref 3.5–5.2)
ALBUMIN/GLOB SERPL: 1.5 G/DL
ALP SERPL-CCNC: 61 U/L (ref 39–117)
ALT SERPL W P-5'-P-CCNC: 26 U/L (ref 1–33)
ANION GAP SERPL CALCULATED.3IONS-SCNC: 16 MMOL/L (ref 5–15)
AST SERPL-CCNC: 23 U/L (ref 1–32)
BACTERIA UR QL AUTO: ABNORMAL /HPF
BASOPHILS # BLD AUTO: 0.02 10*3/MM3 (ref 0–0.2)
BASOPHILS NFR BLD AUTO: 0.3 % (ref 0–1.5)
BILIRUB SERPL-MCNC: 0.6 MG/DL (ref 0–1.2)
BILIRUB UR QL STRIP: NEGATIVE
BUN SERPL-MCNC: 11.5 MG/DL (ref 6–20)
BUN/CREAT SERPL: 17.2 (ref 7–25)
CALCIUM SPEC-SCNC: 9.1 MG/DL (ref 8.6–10.5)
CHLORIDE SERPL-SCNC: 96 MMOL/L (ref 98–107)
CLARITY UR: ABNORMAL
CO2 SERPL-SCNC: 23 MMOL/L (ref 22–29)
COLOR UR: YELLOW
CREAT SERPL-MCNC: 0.67 MG/DL (ref 0.57–1)
D DIMER PPP FEU-MCNC: 0.32 MCGFEU/ML (ref 0–0.52)
DEPRECATED RDW RBC AUTO: 39.8 FL (ref 37–54)
EGFRCR SERPLBLD CKD-EPI 2021: 105.3 ML/MIN/1.73
EOSINOPHIL # BLD AUTO: 0.3 10*3/MM3 (ref 0–0.4)
EOSINOPHIL NFR BLD AUTO: 3.9 % (ref 0.3–6.2)
ERYTHROCYTE [DISTWIDTH] IN BLOOD BY AUTOMATED COUNT: 12.3 % (ref 12.3–15.4)
GEN 5 1HR TROPONIN T REFLEX: 7 NG/L
GLOBULIN UR ELPH-MCNC: 2.9 GM/DL
GLUCOSE SERPL-MCNC: 137 MG/DL (ref 65–99)
GLUCOSE UR STRIP-MCNC: NEGATIVE MG/DL
HCT VFR BLD AUTO: 40.7 % (ref 34–46.6)
HGB BLD-MCNC: 14.2 G/DL (ref 12–15.9)
HGB UR QL STRIP.AUTO: ABNORMAL
HOLD SPECIMEN: NORMAL
HYALINE CASTS UR QL AUTO: ABNORMAL /LPF
IMM GRANULOCYTES # BLD AUTO: 0.02 10*3/MM3 (ref 0–0.05)
IMM GRANULOCYTES NFR BLD AUTO: 0.3 % (ref 0–0.5)
KETONES UR QL STRIP: ABNORMAL
LEUKOCYTE ESTERASE UR QL STRIP.AUTO: ABNORMAL
LYMPHOCYTES # BLD AUTO: 0.52 10*3/MM3 (ref 0.7–3.1)
LYMPHOCYTES NFR BLD AUTO: 6.7 % (ref 19.6–45.3)
MAGNESIUM SERPL-MCNC: 2 MG/DL (ref 1.6–2.6)
MCH RBC QN AUTO: 30.9 PG (ref 26.6–33)
MCHC RBC AUTO-ENTMCNC: 34.9 G/DL (ref 31.5–35.7)
MCV RBC AUTO: 88.5 FL (ref 79–97)
MONOCYTES # BLD AUTO: 0.33 10*3/MM3 (ref 0.1–0.9)
MONOCYTES NFR BLD AUTO: 4.3 % (ref 5–12)
NEUTROPHILS NFR BLD AUTO: 6.55 10*3/MM3 (ref 1.7–7)
NEUTROPHILS NFR BLD AUTO: 84.5 % (ref 42.7–76)
NITRITE UR QL STRIP: NEGATIVE
NRBC BLD AUTO-RTO: 0 /100 WBC (ref 0–0.2)
NT-PROBNP SERPL-MCNC: 93.2 PG/ML (ref 0–900)
PH UR STRIP.AUTO: 5.5 [PH] (ref 5–8)
PLATELET # BLD AUTO: 264 10*3/MM3 (ref 140–450)
PMV BLD AUTO: 9.8 FL (ref 6–12)
POTASSIUM SERPL-SCNC: 2.7 MMOL/L (ref 3.5–5.2)
PROT SERPL-MCNC: 7.2 G/DL (ref 6–8.5)
PROT UR QL STRIP: ABNORMAL
RBC # BLD AUTO: 4.6 10*6/MM3 (ref 3.77–5.28)
RBC # UR STRIP: ABNORMAL /HPF
REF LAB TEST METHOD: ABNORMAL
SODIUM SERPL-SCNC: 135 MMOL/L (ref 136–145)
SP GR UR STRIP: 1.02 (ref 1–1.03)
SQUAMOUS #/AREA URNS HPF: ABNORMAL /HPF
TROPONIN T NUMERIC DELTA: 0 NG/L
TROPONIN T SERPL HS-MCNC: 7 NG/L
UROBILINOGEN UR QL STRIP: ABNORMAL
WBC # UR STRIP: ABNORMAL /HPF
WBC NRBC COR # BLD AUTO: 7.74 10*3/MM3 (ref 3.4–10.8)
WHOLE BLOOD HOLD COAG: NORMAL
WHOLE BLOOD HOLD SPECIMEN: NORMAL

## 2025-07-25 PROCEDURE — 25010000002 DIPHENHYDRAMINE PER 50 MG: Performed by: STUDENT IN AN ORGANIZED HEALTH CARE EDUCATION/TRAINING PROGRAM

## 2025-07-25 PROCEDURE — 93010 ELECTROCARDIOGRAM REPORT: CPT | Performed by: INTERNAL MEDICINE

## 2025-07-25 PROCEDURE — 93005 ELECTROCARDIOGRAM TRACING: CPT | Performed by: STUDENT IN AN ORGANIZED HEALTH CARE EDUCATION/TRAINING PROGRAM

## 2025-07-25 PROCEDURE — 83880 ASSAY OF NATRIURETIC PEPTIDE: CPT | Performed by: STUDENT IN AN ORGANIZED HEALTH CARE EDUCATION/TRAINING PROGRAM

## 2025-07-25 PROCEDURE — 36415 COLL VENOUS BLD VENIPUNCTURE: CPT

## 2025-07-25 PROCEDURE — 85379 FIBRIN DEGRADATION QUANT: CPT | Performed by: STUDENT IN AN ORGANIZED HEALTH CARE EDUCATION/TRAINING PROGRAM

## 2025-07-25 PROCEDURE — 96374 THER/PROPH/DIAG INJ IV PUSH: CPT

## 2025-07-25 PROCEDURE — 85025 COMPLETE CBC W/AUTO DIFF WBC: CPT | Performed by: STUDENT IN AN ORGANIZED HEALTH CARE EDUCATION/TRAINING PROGRAM

## 2025-07-25 PROCEDURE — 96375 TX/PRO/DX INJ NEW DRUG ADDON: CPT

## 2025-07-25 PROCEDURE — 99284 EMERGENCY DEPT VISIT MOD MDM: CPT | Performed by: STUDENT IN AN ORGANIZED HEALTH CARE EDUCATION/TRAINING PROGRAM

## 2025-07-25 PROCEDURE — 87086 URINE CULTURE/COLONY COUNT: CPT | Performed by: STUDENT IN AN ORGANIZED HEALTH CARE EDUCATION/TRAINING PROGRAM

## 2025-07-25 PROCEDURE — 25010000002 DEXAMETHASONE PER 1 MG: Performed by: STUDENT IN AN ORGANIZED HEALTH CARE EDUCATION/TRAINING PROGRAM

## 2025-07-25 PROCEDURE — 81001 URINALYSIS AUTO W/SCOPE: CPT | Performed by: STUDENT IN AN ORGANIZED HEALTH CARE EDUCATION/TRAINING PROGRAM

## 2025-07-25 PROCEDURE — 25010000002 FAMOTIDINE 10 MG/ML SOLUTION: Performed by: STUDENT IN AN ORGANIZED HEALTH CARE EDUCATION/TRAINING PROGRAM

## 2025-07-25 PROCEDURE — 84484 ASSAY OF TROPONIN QUANT: CPT | Performed by: STUDENT IN AN ORGANIZED HEALTH CARE EDUCATION/TRAINING PROGRAM

## 2025-07-25 PROCEDURE — 80053 COMPREHEN METABOLIC PANEL: CPT | Performed by: STUDENT IN AN ORGANIZED HEALTH CARE EDUCATION/TRAINING PROGRAM

## 2025-07-25 PROCEDURE — 83735 ASSAY OF MAGNESIUM: CPT | Performed by: STUDENT IN AN ORGANIZED HEALTH CARE EDUCATION/TRAINING PROGRAM

## 2025-07-25 PROCEDURE — 25810000003 SODIUM CHLORIDE 0.9 % SOLUTION: Performed by: STUDENT IN AN ORGANIZED HEALTH CARE EDUCATION/TRAINING PROGRAM

## 2025-07-25 PROCEDURE — 71045 X-RAY EXAM CHEST 1 VIEW: CPT

## 2025-07-25 RX ORDER — FAMOTIDINE 10 MG/ML
20 INJECTION, SOLUTION INTRAVENOUS ONCE
Status: COMPLETED | OUTPATIENT
Start: 2025-07-25 | End: 2025-07-25

## 2025-07-25 RX ORDER — DIPHENHYDRAMINE HYDROCHLORIDE 50 MG/ML
25 INJECTION, SOLUTION INTRAMUSCULAR; INTRAVENOUS ONCE
Status: COMPLETED | OUTPATIENT
Start: 2025-07-25 | End: 2025-07-25

## 2025-07-25 RX ORDER — POTASSIUM CHLORIDE 1500 MG/1
40 TABLET, EXTENDED RELEASE ORAL ONCE
Status: COMPLETED | OUTPATIENT
Start: 2025-07-25 | End: 2025-07-25

## 2025-07-25 RX ORDER — DEXAMETHASONE SODIUM PHOSPHATE 10 MG/ML
10 INJECTION, SOLUTION INTRA-ARTICULAR; INTRALESIONAL; INTRAMUSCULAR; INTRAVENOUS; SOFT TISSUE ONCE
Status: COMPLETED | OUTPATIENT
Start: 2025-07-25 | End: 2025-07-25

## 2025-07-25 RX ADMIN — SODIUM CHLORIDE 1000 ML: 9 INJECTION, SOLUTION INTRAVENOUS at 22:51

## 2025-07-25 RX ADMIN — FAMOTIDINE 20 MG: 10 INJECTION INTRAVENOUS at 22:50

## 2025-07-25 RX ADMIN — POTASSIUM CHLORIDE 40 MEQ: 1500 TABLET, EXTENDED RELEASE ORAL at 23:50

## 2025-07-25 RX ADMIN — DEXAMETHASONE SODIUM PHOSPHATE 10 MG: 10 INJECTION INTRAMUSCULAR; INTRAVENOUS at 22:50

## 2025-07-25 RX ADMIN — DIPHENHYDRAMINE HYDROCHLORIDE 25 MG: 50 INJECTION INTRAMUSCULAR; INTRAVENOUS at 22:50

## 2025-07-26 VITALS
OXYGEN SATURATION: 99 % | SYSTOLIC BLOOD PRESSURE: 120 MMHG | TEMPERATURE: 98.8 F | BODY MASS INDEX: 30.16 KG/M2 | WEIGHT: 181 LBS | DIASTOLIC BLOOD PRESSURE: 72 MMHG | RESPIRATION RATE: 18 BRPM | HEART RATE: 98 BPM | HEIGHT: 65 IN

## 2025-07-26 NOTE — DISCHARGE INSTRUCTIONS
As discussed please follow-up with your doctor for concerns of low potassium, as well as recurring urinary tract infection.  Today you were seen in the emergency room for concerns of hives.  I recommend Benadryl over-the-counter for concerns of hives and itching.

## 2025-07-26 NOTE — ED PROVIDER NOTES
"Subjective   History of Present Illness  The patient is a 54-year-old female who presents to the ED with complaints of a diffuse rash, dizziness, and shortness of breath that began today while at work. She reports that the rash started after she woke up. She took two 25mg Benadryl tablets prior to arrival with minimal improvement. The patient also reports ongoing abdominal pain, which she attributes to diverticulitis that has been present for a couple of months. She was seen by her primary care provider earlier today who noted her heart rate was \"a little off\" and recommended she monitor her blood pressure. The patient denies taking any new medications. She has a known allergy to Bactrim which causes respiratory difficulty. She denies any prior history of hives or anaphylaxis.        Review of Systems   All other systems reviewed and are negative.      Past Medical History:   Diagnosis Date    Acute diverticulitis     Anxiety     Diastasis recti     GERD (gastroesophageal reflux disease)     Hiatal hernia     Hyperlipidemia     Hypothyroid     Kidney stones     Obstructive sleep apnea     Ovarian cyst     UTI (urinary tract infection)        Allergies   Allergen Reactions    Bactrim [Sulfamethoxazole-Trimethoprim] Anaphylaxis    Azithromycin Hives    Macrolides And Ketolides Dizziness     Short of breath       Past Surgical History:   Procedure Laterality Date    BREAST BIOPSY Left 2 yrs ago    CHOLECYSTECTOMY      COLONOSCOPY      ENDOMETRIAL ABLATION      ENDOSCOPY      FOOT ARTHRODESIS, MODIFIED DOUGLASS      HERNIA REPAIR      HIATAL HERNIA REPAIR N/A 5/2/2024    Procedure: LAPAROSCOPIC HIATAL HERNIA REPAIR WITH DAVINCI ROBOT;  Surgeon: Uli Marte MD;  Location: [facility];  Service: Robotics - DaVinci;  Laterality: N/A;    TUBAL ABDOMINAL LIGATION         Family History   Problem Relation Age of Onset    Hypertension Father     Heart disease Father     Alcohol abuse Father     Stroke Mother     COPD Mother "     Hypertension Brother     No Known Problems Daughter     No Known Problems Paternal Grandmother     No Known Problems Maternal Grandmother     Cancer Maternal Grandfather     No Known Problems Maternal Aunt     No Known Problems Paternal Aunt     BRCA 1/2 Neg Hx     Breast cancer Neg Hx     Colon cancer Neg Hx     Endometrial cancer Neg Hx     Ovarian cancer Neg Hx        Social History     Socioeconomic History    Marital status:    Tobacco Use    Smoking status: Never     Passive exposure: Never    Smokeless tobacco: Never   Vaping Use    Vaping status: Never Used   Substance and Sexual Activity    Alcohol use: No    Drug use: No    Sexual activity: Yes     Partners: Male     Birth control/protection: Surgical           Objective   Physical Exam    Constitutional:  General: Patient is not in acute distress.  Appearance: Patient is not diaphoretic.    HENT:  Head: Normocephalic and atraumatic.  Right Ear: External ear normal.  Left Ear: External ear normal.  Nose: Nose normal.  **Oropharynx: Clear, no swelling or erythema noted. Tongue normal.**    Eyes:  General: No scleral icterus.  Conjunctiva/sclera: Conjunctivae normal.    Cardiovascular:  Rate and Rhythm: **Tachycardic** with regular rhythm.  Heart sounds: No friction rub.    Pulmonary:  Effort: Pulmonary effort is normal. No respiratory distress.  Breath sounds: **No stridor.** No wheezing.    Abdominal:  Palpations: **Abdomen is soft.**  Tenderness: **There is mild tenderness in the lower abdomen. There is no guarding or rebound.**    Musculoskeletal:  General: No deformity.    Skin:  General: **Diffuse blanching urticarial rash present throughout body. Classic hives appearance.**    Neurological:  General: No focal deficit present.  Mental Status: Alert and oriented.    Procedures           ED Course  ED Course as of 07/26/25 0103   Fri Jul 25, 2025 2209 Positive for sinus tachycardia.  On EKG no STEMI [SG]   2339 On her last set of labs the  patient did have hypokalemia.  Today the hypokalemia is worse is 2.7.  EKG shows mild QTc prolongation.  Will replete potassium level.  There is no significant concerns of elevated white count.  Troponin was negative x 2.  D-dimer is negative for concerns of pulmonary embolism.  X-ray of the chest was negative for acute findings.  Her rash is much improved, her vital signs have normalized [SG]   2341 Will check magnesium level [SG]   2349 Patient is much improved pending magnesium level [SG]   Sat Jul 26, 2025   0004 Nasal mass 2.0.  The patient is already taking antibiotics for UTI.  I encouraged the patient to follow-up with primary care doctor, for concerns of hives as well as low potassium.  We discussed the need to follow-up with urology for concerns of recurrent urinary tract infections.  The patient feels good about being discharged. [SG]   0010 Abdomen remains soft and nontender.  No concerns for elevated white count.  Her vital signs have normalized.  No indication for CT abdomen and pelvis [SG]      ED Course User Index  [SG] Harrison Fisher MD                                                       Medical Decision Making  Patient presents with acute urticaria with associated tachycardia and shortness of breath.    The patient's presentation is consistent with an acute urticarial reaction (hives). The rash is classic in appearance with blanching noted on examination. The patient has associated tachycardia which can be seen with allergic reactions. While the patient reports shortness of breath, there is no evidence of stridor or respiratory compromise on examination.    Given the patient's presentation with shortness of breath and tachycardia, I am concerned about possible anaphylaxis versus pulmonary embolism. The patient's throat is clear without evidence of airway compromise. I plan to administer IV Benadryl and dexamethasone for the urticarial reaction. Additionally, I will order a D-dimer to screen for  possible pulmonary embolism, as well as an EKG to evaluate the tachycardia. Basic labs including CBC and electrolytes will be obtained to evaluate for any underlying infection or metabolic abnormalities.    The patient also reports chronic abdominal pain attributed to diverticulitis. On examination, there is mild tenderness in the lower abdomen, but the abdomen is soft without guarding or rebound. I will check a white blood cell count to evaluate for possible ongoing infection. If the WBC is significantly elevated compared to previous values, further imaging of the abdomen may be warranted.    Treatment plan includes IV fluids, IV Benadryl, and IV dexamethasone. I will also provide the patient with a prescription for oral prednisone to take at home if symptoms persist or recur, along with instructions to continue Benadryl as needed.    Problems Addressed:  Acute UTI: complicated acute illness or injury  Hives: complicated acute illness or injury  Hypokalemia: complicated acute illness or injury    Amount and/or Complexity of Data Reviewed  Labs: ordered.  Radiology: ordered.  ECG/medicine tests: ordered.    Risk  Prescription drug management.        Final diagnoses:   Hives   Hypokalemia   Acute UTI       ED Disposition  ED Disposition       ED Disposition   Discharge    Condition   Stable    Comment   --               Rik Berry MD  [address] [place] [id number]  [phone]    Schedule an appointment as soon as possible for a visit            Medication List      No changes were made to your prescriptions during this visit.            Harrison Fisher MD  07/26/25 0103

## 2025-07-27 LAB
BACTERIA SPEC AEROBE CULT: NO GROWTH
QT INTERVAL: 372 MS
QTC INTERVAL: 517 MS

## 2025-08-06 ENCOUNTER — OFFICE VISIT (OUTPATIENT)
Age: 55
End: 2025-08-06
Payer: COMMERCIAL

## 2025-08-06 VITALS
HEIGHT: 65 IN | DIASTOLIC BLOOD PRESSURE: 72 MMHG | BODY MASS INDEX: 29.99 KG/M2 | WEIGHT: 180 LBS | SYSTOLIC BLOOD PRESSURE: 122 MMHG

## 2025-08-06 DIAGNOSIS — N39.0 FREQUENT UTI: ICD-10-CM

## 2025-08-06 DIAGNOSIS — N83.209 CYST OF OVARY, UNSPECIFIED LATERALITY: Primary | ICD-10-CM

## 2025-08-11 ENCOUNTER — TELEPHONE (OUTPATIENT)
Dept: GASTROENTEROLOGY | Age: 55
End: 2025-08-11

## 2025-08-28 ENCOUNTER — OFFICE VISIT (OUTPATIENT)
Dept: GASTROENTEROLOGY | Age: 55
End: 2025-08-28
Payer: COMMERCIAL

## 2025-08-28 VITALS
OXYGEN SATURATION: 98 % | DIASTOLIC BLOOD PRESSURE: 74 MMHG | HEIGHT: 65 IN | HEART RATE: 85 BPM | SYSTOLIC BLOOD PRESSURE: 124 MMHG | BODY MASS INDEX: 29.99 KG/M2 | WEIGHT: 180 LBS

## 2025-08-28 DIAGNOSIS — R53.83 OTHER FATIGUE: ICD-10-CM

## 2025-08-28 DIAGNOSIS — R10.9 ABDOMINAL CRAMPING: ICD-10-CM

## 2025-08-28 DIAGNOSIS — R19.7 DIARRHEA, UNSPECIFIED TYPE: Primary | ICD-10-CM

## 2025-08-28 DIAGNOSIS — R19.7 DIARRHEA, UNSPECIFIED TYPE: ICD-10-CM

## 2025-08-28 LAB
CRP SERPL-MCNC: <3 MG/L (ref 0–5)
ERYTHROCYTE [SEDIMENTATION RATE] IN BLOOD BY WESTERGREN METHOD: 4 MM/HR (ref 0–25)

## 2025-08-28 PROCEDURE — 99214 OFFICE O/P EST MOD 30 MIN: CPT | Performed by: NURSE PRACTITIONER

## 2025-08-29 DIAGNOSIS — R19.7 DIARRHEA, UNSPECIFIED TYPE: ICD-10-CM

## 2025-08-29 DIAGNOSIS — R10.9 ABDOMINAL CRAMPING: ICD-10-CM

## 2025-08-30 ENCOUNTER — APPOINTMENT (OUTPATIENT)
Dept: CT IMAGING | Facility: HOSPITAL | Age: 55
End: 2025-08-30
Payer: COMMERCIAL

## 2025-08-30 ENCOUNTER — HOSPITAL ENCOUNTER (EMERGENCY)
Facility: HOSPITAL | Age: 55
Discharge: HOME | End: 2025-08-30
Attending: EMERGENCY MEDICINE
Payer: COMMERCIAL

## 2025-08-30 VITALS
HEIGHT: 65 IN | TEMPERATURE: 98 F | DIASTOLIC BLOOD PRESSURE: 68 MMHG | RESPIRATION RATE: 18 BRPM | BODY MASS INDEX: 28.99 KG/M2 | OXYGEN SATURATION: 97 % | SYSTOLIC BLOOD PRESSURE: 106 MMHG | WEIGHT: 174 LBS | HEART RATE: 78 BPM

## 2025-08-30 DIAGNOSIS — N20.0 KIDNEY STONE: Primary | ICD-10-CM

## 2025-08-30 DIAGNOSIS — N39.0 URINARY TRACT INFECTION WITHOUT HEMATURIA, SITE UNSPECIFIED: ICD-10-CM

## 2025-08-30 LAB
ALBUMIN SERPL-MCNC: 4.6 G/DL (ref 3.5–5.2)
ALBUMIN/GLOB SERPL: 1.7 G/DL
ALP SERPL-CCNC: 50 U/L (ref 39–117)
ALT SERPL W P-5'-P-CCNC: 30 U/L (ref 1–33)
ANION GAP SERPL CALCULATED.3IONS-SCNC: 14 MMOL/L (ref 5–15)
AST SERPL-CCNC: 22 U/L (ref 1–32)
BACTERIA UR QL AUTO: ABNORMAL /HPF
BASOPHILS # BLD AUTO: 0.05 10*3/MM3 (ref 0–0.2)
BASOPHILS NFR BLD AUTO: 0.8 % (ref 0–1.5)
BILIRUB SERPL-MCNC: 0.5 MG/DL (ref 0–1.2)
BILIRUB UR QL STRIP: ABNORMAL
BUN SERPL-MCNC: 18.5 MG/DL (ref 6–20)
BUN/CREAT SERPL: 26.4 (ref 7–25)
CALCIUM SPEC-SCNC: 10.2 MG/DL (ref 8.6–10.5)
CHLORIDE SERPL-SCNC: 101 MMOL/L (ref 98–107)
CLARITY UR: ABNORMAL
CO2 SERPL-SCNC: 25 MMOL/L (ref 22–29)
COLOR UR: YELLOW
CREAT SERPL-MCNC: 0.7 MG/DL (ref 0.57–1)
DEPRECATED RDW RBC AUTO: 42.6 FL (ref 37–54)
EGFRCR SERPLBLD CKD-EPI 2021: 103.6 ML/MIN/1.73
EOSINOPHIL # BLD AUTO: 0.37 10*3/MM3 (ref 0–0.4)
EOSINOPHIL NFR BLD AUTO: 6.1 % (ref 0.3–6.2)
ERYTHROCYTE [DISTWIDTH] IN BLOOD BY AUTOMATED COUNT: 12.4 % (ref 12.3–15.4)
GLOBULIN UR ELPH-MCNC: 2.7 GM/DL
GLUCOSE SERPL-MCNC: 123 MG/DL (ref 65–99)
GLUCOSE UR STRIP-MCNC: NEGATIVE MG/DL
HCT VFR BLD AUTO: 41.8 % (ref 34–46.6)
HGB BLD-MCNC: 14.1 G/DL (ref 12–15.9)
HGB UR QL STRIP.AUTO: ABNORMAL
HOLD SPECIMEN: NORMAL
HYALINE CASTS UR QL AUTO: ABNORMAL /LPF
IMM GRANULOCYTES # BLD AUTO: 0.01 10*3/MM3 (ref 0–0.05)
IMM GRANULOCYTES NFR BLD AUTO: 0.2 % (ref 0–0.5)
KETONES UR QL STRIP: ABNORMAL
LEUKOCYTE ESTERASE UR QL STRIP.AUTO: NEGATIVE
LYMPHOCYTES # BLD AUTO: 2.15 10*3/MM3 (ref 0.7–3.1)
LYMPHOCYTES NFR BLD AUTO: 35.6 % (ref 19.6–45.3)
MCH RBC QN AUTO: 31.4 PG (ref 26.6–33)
MCHC RBC AUTO-ENTMCNC: 33.7 G/DL (ref 31.5–35.7)
MCV RBC AUTO: 93.1 FL (ref 79–97)
MONOCYTES # BLD AUTO: 0.58 10*3/MM3 (ref 0.1–0.9)
MONOCYTES NFR BLD AUTO: 9.6 % (ref 5–12)
NEUTROPHILS NFR BLD AUTO: 2.88 10*3/MM3 (ref 1.7–7)
NEUTROPHILS NFR BLD AUTO: 47.7 % (ref 42.7–76)
NITRITE UR QL STRIP: POSITIVE
NRBC BLD AUTO-RTO: 0 /100 WBC (ref 0–0.2)
PH UR STRIP.AUTO: 5.5 [PH] (ref 5–8)
PLATELET # BLD AUTO: 331 10*3/MM3 (ref 140–450)
PMV BLD AUTO: 9.5 FL (ref 6–12)
POTASSIUM SERPL-SCNC: 3.8 MMOL/L (ref 3.5–5.2)
PROT SERPL-MCNC: 7.3 G/DL (ref 6–8.5)
PROT UR QL STRIP: ABNORMAL
RBC # BLD AUTO: 4.49 10*6/MM3 (ref 3.77–5.28)
RBC # UR STRIP: ABNORMAL /HPF
REF LAB TEST METHOD: ABNORMAL
SODIUM SERPL-SCNC: 140 MMOL/L (ref 136–145)
SP GR UR STRIP: >=1.03 (ref 1–1.03)
SQUAMOUS #/AREA URNS HPF: ABNORMAL /HPF
UROBILINOGEN UR QL STRIP: ABNORMAL
WBC # UR STRIP: ABNORMAL /HPF
WBC NRBC COR # BLD AUTO: 6.04 10*3/MM3 (ref 3.4–10.8)
WHOLE BLOOD HOLD COAG: NORMAL
WHOLE BLOOD HOLD SPECIMEN: NORMAL
YEAST URNS QL MICRO: ABNORMAL /HPF

## 2025-08-30 PROCEDURE — 80053 COMPREHEN METABOLIC PANEL: CPT | Performed by: EMERGENCY MEDICINE

## 2025-08-30 PROCEDURE — 25010000002 ONDANSETRON PER 1 MG: Performed by: EMERGENCY MEDICINE

## 2025-08-30 PROCEDURE — 74176 CT ABD & PELVIS W/O CONTRAST: CPT

## 2025-08-30 PROCEDURE — 99284 EMERGENCY DEPT VISIT MOD MDM: CPT | Performed by: EMERGENCY MEDICINE

## 2025-08-30 PROCEDURE — 25810000003 LACTATED RINGERS SOLUTION: Performed by: EMERGENCY MEDICINE

## 2025-08-30 PROCEDURE — 25010000002 KETOROLAC TROMETHAMINE PER 15 MG: Performed by: EMERGENCY MEDICINE

## 2025-08-30 PROCEDURE — 25010000002 CEFTRIAXONE PER 250 MG: Performed by: EMERGENCY MEDICINE

## 2025-08-30 PROCEDURE — 81001 URINALYSIS AUTO W/SCOPE: CPT | Performed by: EMERGENCY MEDICINE

## 2025-08-30 PROCEDURE — 85025 COMPLETE CBC W/AUTO DIFF WBC: CPT | Performed by: EMERGENCY MEDICINE

## 2025-08-30 RX ORDER — ONDANSETRON 2 MG/ML
4 INJECTION INTRAMUSCULAR; INTRAVENOUS ONCE
Status: COMPLETED | OUTPATIENT
Start: 2025-08-30 | End: 2025-08-30

## 2025-08-30 RX ORDER — CEFDINIR 300 MG/1
300 CAPSULE ORAL 2 TIMES DAILY
Qty: 14 CAPSULE | Refills: 0 | Status: SHIPPED | OUTPATIENT
Start: 2025-08-30 | End: 2025-08-30

## 2025-08-30 RX ORDER — KETOROLAC TROMETHAMINE 15 MG/ML
15 INJECTION, SOLUTION INTRAMUSCULAR; INTRAVENOUS ONCE
Status: COMPLETED | OUTPATIENT
Start: 2025-08-30 | End: 2025-08-30

## 2025-08-30 RX ORDER — OXYCODONE AND ACETAMINOPHEN 5; 325 MG/1; MG/1
1 TABLET ORAL ONCE
Refills: 0 | Status: COMPLETED | OUTPATIENT
Start: 2025-08-30 | End: 2025-08-30

## 2025-08-30 RX ORDER — TAMSULOSIN HYDROCHLORIDE 0.4 MG/1
1 CAPSULE ORAL DAILY
Qty: 12 CAPSULE | Refills: 0 | Status: SHIPPED | OUTPATIENT
Start: 2025-08-30

## 2025-08-30 RX ORDER — ONDANSETRON 4 MG/1
4 TABLET, ORALLY DISINTEGRATING ORAL EVERY 4 HOURS PRN
Qty: 15 TABLET | Refills: 0 | Status: SHIPPED | OUTPATIENT
Start: 2025-08-30

## 2025-08-30 RX ORDER — FLUCONAZOLE 150 MG/1
150 TABLET ORAL ONCE
Qty: 1 TABLET | Refills: 0 | Status: SHIPPED | OUTPATIENT
Start: 2025-08-30 | End: 2025-08-30

## 2025-08-30 RX ORDER — ONDANSETRON 4 MG/1
4 TABLET, ORALLY DISINTEGRATING ORAL EVERY 4 HOURS PRN
Qty: 15 TABLET | Refills: 0 | Status: SHIPPED | OUTPATIENT
Start: 2025-08-30 | End: 2025-08-30

## 2025-08-30 RX ORDER — NAPROXEN 500 MG/1
500 TABLET ORAL 2 TIMES DAILY WITH MEALS
Qty: 20 TABLET | Refills: 0 | Status: SHIPPED | OUTPATIENT
Start: 2025-08-30

## 2025-08-30 RX ORDER — NAPROXEN 500 MG/1
500 TABLET ORAL 2 TIMES DAILY WITH MEALS
Qty: 20 TABLET | Refills: 0 | Status: SHIPPED | OUTPATIENT
Start: 2025-08-30 | End: 2025-08-30

## 2025-08-30 RX ORDER — CEFDINIR 300 MG/1
300 CAPSULE ORAL 2 TIMES DAILY
Qty: 14 CAPSULE | Refills: 0 | Status: SHIPPED | OUTPATIENT
Start: 2025-08-30

## 2025-08-30 RX ORDER — TAMSULOSIN HYDROCHLORIDE 0.4 MG/1
1 CAPSULE ORAL DAILY
Qty: 12 CAPSULE | Refills: 0 | Status: SHIPPED | OUTPATIENT
Start: 2025-08-30 | End: 2025-08-30

## 2025-08-30 RX ORDER — OXYCODONE AND ACETAMINOPHEN 5; 325 MG/1; MG/1
1-2 TABLET ORAL EVERY 6 HOURS PRN
Qty: 15 TABLET | Refills: 0 | Status: SHIPPED | OUTPATIENT
Start: 2025-08-30 | End: 2025-08-30

## 2025-08-30 RX ORDER — OXYCODONE AND ACETAMINOPHEN 5; 325 MG/1; MG/1
1-2 TABLET ORAL EVERY 6 HOURS PRN
Qty: 15 TABLET | Refills: 0 | Status: SHIPPED | OUTPATIENT
Start: 2025-08-30

## 2025-08-30 RX ORDER — SODIUM CHLORIDE 0.9 % (FLUSH) 0.9 %
10 SYRINGE (ML) INJECTION AS NEEDED
Status: DISCONTINUED | OUTPATIENT
Start: 2025-08-30 | End: 2025-08-30 | Stop reason: HOSPADM

## 2025-08-30 RX ADMIN — SODIUM CHLORIDE 2000 MG: 900 INJECTION INTRAVENOUS at 06:48

## 2025-08-30 RX ADMIN — SODIUM CHLORIDE, POTASSIUM CHLORIDE, SODIUM LACTATE AND CALCIUM CHLORIDE 1000 ML: 600; 310; 30; 20 INJECTION, SOLUTION INTRAVENOUS at 05:53

## 2025-08-30 RX ADMIN — ONDANSETRON 4 MG: 2 INJECTION, SOLUTION INTRAMUSCULAR; INTRAVENOUS at 05:52

## 2025-08-30 RX ADMIN — OXYCODONE HYDROCHLORIDE AND ACETAMINOPHEN 1 TABLET: 5; 325 TABLET ORAL at 07:30

## 2025-08-30 RX ADMIN — KETOROLAC TROMETHAMINE 15 MG: 15 INJECTION, SOLUTION INTRAMUSCULAR; INTRAVENOUS at 05:52

## 2025-08-31 LAB
CALPROTECTIN STL-MCNT: 107 UG/G
NUCLEAR IGG SER QL IA: NORMAL

## 2025-09-01 ENCOUNTER — RESULTS FOLLOW-UP (OUTPATIENT)
Dept: GASTROENTEROLOGY | Age: 55
End: 2025-09-01

## 2025-09-01 LAB
APTT SCREEN TO CONFIRM RATIO: 0.95 {RATIO}
CONFIRM DRVVT STA-STACLOT: NORMAL S
DRVVT SCREEN TO CONFIRM RATIO: 0.82 {RATIO}
DRVVT SCREEN TO CONFIRM RATIO: NORMAL {RATIO}
DRVVT/DRVVT CFM P DOAC NEUT NORM PPP-RTO: NORMAL {RATIO}
HEPARIN NT PPP QL: NORMAL
LA 3 SCREEN W REFLEX-IMP: NORMAL
LMW HEPARIN IND PLT AB SER QL: NORMAL
MIXING DRVVT: NORMAL S
PROTHROMBIN TIME: 12.5 S (ref 12–15.5)
SCREEN APTT: NORMAL S
THROMBIN TIME: NORMAL S

## 2025-09-01 ASSESSMENT — ENCOUNTER SYMPTOMS
RECTAL PAIN: 0
NAUSEA: 0
BLOOD IN STOOL: 0
CHOKING: 0
ANAL BLEEDING: 0
DIARRHEA: 0
SHORTNESS OF BREATH: 0
ABDOMINAL DISTENTION: 0
TROUBLE SWALLOWING: 0
COUGH: 0
VOMITING: 0
ABDOMINAL PAIN: 1

## (undated) DEVICE — GLV SURG SENSICARE W/ALOE PF LF 8 STRL

## (undated) DEVICE — SUREFIT, DUAL DISPERSIVE ELECTRODE, CONTACT QUALITY MONITOR: Brand: SUREFIT

## (undated) DEVICE — ENDOPATH XCEL WITH OPTIVIEW TECHNOLOGY BLADELESS TROCARS WITH STABILITY SLEEVES: Brand: ENDOPATH XCEL OPTIVIEW

## (undated) DEVICE — CANNULA NSL AD L7FT DIV O2 CO2 W/ M LUERLOCK TRMPT CONN

## (undated) DEVICE — NEEDLE,22GX1.5",REG,BEVEL: Brand: MEDLINE

## (undated) DEVICE — CLEANING SPONGE: Brand: KOALA™

## (undated) DEVICE — SYSTEM VENT MED AD NASAL PAP SUPERNO2VA

## (undated) DEVICE — ADHS SKIN PREMIERPRO EXOFIN TOPICAL HI/VISC .5ML

## (undated) DEVICE — SINGLE PORT MANIFOLD: Brand: NEPTUNE 2

## (undated) DEVICE — FORCEPS BX L240CM JAW DIA2.4MM ORNG L CAP W/ NDL DISP RAD

## (undated) DEVICE — 1/4 IN. X 12 IN. LENGTH: Brand: SILICONE TUBING, PENROSE DRAIN

## (undated) DEVICE — FORCEPS BX 240CM 2.4MM L NDL RAD JAW 4 M00513334

## (undated) DEVICE — BRUSH ENDOSCP 2 END CHN HEDGEHOG

## (undated) DEVICE — SUT MNCRYL 4/0 PS2 27IN UD MCP426H

## (undated) DEVICE — SUT ETHIB 2/0 SH SH 36IN X523H

## (undated) DEVICE — BLADELESS OBTURATOR: Brand: WECK VISTA

## (undated) DEVICE — SEAL

## (undated) DEVICE — SYR LL TP 10ML STRL

## (undated) DEVICE — COLON KIT WITH 1.1 OZ ORCA HYDRA SEAL 2 GOWN

## (undated) DEVICE — VESSEL SEALER EXTEND: Brand: ENDOWRIST

## (undated) DEVICE — ARM DRAPE

## (undated) DEVICE — VISIGI 3D®  CALIBRATION SYSTEM  SIZE 40FR STD W/ BULB: Brand: BOEHRINGER® VISIGI 3D™ SLEEVE GASTRECTOMY CALIBRATION SYSTEM, SIZE 40FR W/BULB

## (undated) DEVICE — ADAPTER CLEANING PORPOISE CLEANING

## (undated) DEVICE — BANDAGE,GAUZE,BULKEE II,4.5"X4.1YD,STRL: Brand: MEDLINE

## (undated) DEVICE — 2, DISPOSABLE SUCTION/IRRIGATOR WITHOUT DISPOSABLE TIP: Brand: STRYKEFLOW

## (undated) DEVICE — SUPPLEMENT DIGESTIVE H2O SOL GI-EASE

## (undated) DEVICE — ST TBG AIRSEAL FLTR TRI LUM

## (undated) DEVICE — SAVARY-GILLIARD DILATOR: Brand: SAVARY-GILLIARD

## (undated) DEVICE — BITE BLOCK ENDOSCP AD 60 FR W/ ADJ STRP PLAS GRN BLOX

## (undated) DEVICE — KT CLN CLEANOR SCPE

## (undated) DEVICE — SYS CLOSE PORTII CARTR/THOMASN XL

## (undated) DEVICE — DAVINCI: Brand: MEDLINE INDUSTRIES, INC.

## (undated) DEVICE — ENDO KIT,LOURDES HOSPITAL: Brand: MEDLINE INDUSTRIES, INC.

## (undated) DEVICE — TROC BLADLES ANCHORPORT/OPTI LP 8X120MM 1P/U

## (undated) DEVICE — SUT ETHIB 0 M/HL 36IN X844H

## (undated) DEVICE — SUT VIC 0 SUTUPAK TIES 18IN J906G

## (undated) DEVICE — REDUCER: Brand: ENDOWRIST